# Patient Record
Sex: FEMALE | Race: WHITE | NOT HISPANIC OR LATINO | Employment: OTHER | URBAN - METROPOLITAN AREA
[De-identification: names, ages, dates, MRNs, and addresses within clinical notes are randomized per-mention and may not be internally consistent; named-entity substitution may affect disease eponyms.]

---

## 2020-06-19 ENCOUNTER — HOSPITAL ENCOUNTER (EMERGENCY)
Facility: HOSPITAL | Age: 75
Discharge: LEFT BEFORE DISCHARGE | End: 2020-06-19
Payer: MEDICARE

## 2020-06-19 VITALS
SYSTOLIC BLOOD PRESSURE: 140 MMHG | HEART RATE: 88 BPM | BODY MASS INDEX: 25.69 KG/M2 | DIASTOLIC BLOOD PRESSURE: 90 MMHG | RESPIRATION RATE: 18 BRPM | OXYGEN SATURATION: 97 % | WEIGHT: 145 LBS | TEMPERATURE: 99 F | HEIGHT: 63 IN

## 2020-06-19 DIAGNOSIS — R07.9 CHEST PAIN: ICD-10-CM

## 2020-06-19 LAB
ALBUMIN SERPL BCP-MCNC: 4.4 G/DL (ref 3.5–5.2)
ALP SERPL-CCNC: 55 U/L (ref 55–135)
ALT SERPL W/O P-5'-P-CCNC: 16 U/L (ref 10–44)
ANION GAP SERPL CALC-SCNC: 15 MMOL/L (ref 8–16)
AST SERPL-CCNC: 18 U/L (ref 10–40)
BASOPHILS # BLD AUTO: 0.04 K/UL (ref 0–0.2)
BASOPHILS NFR BLD: 0.7 % (ref 0–1.9)
BILIRUB SERPL-MCNC: 0.8 MG/DL (ref 0.1–1)
BNP SERPL-MCNC: 118 PG/ML (ref 0–99)
BUN SERPL-MCNC: 16 MG/DL (ref 8–23)
CALCIUM SERPL-MCNC: 9.5 MG/DL (ref 8.7–10.5)
CHLORIDE SERPL-SCNC: 101 MMOL/L (ref 95–110)
CO2 SERPL-SCNC: 21 MMOL/L (ref 23–29)
CREAT SERPL-MCNC: 0.8 MG/DL (ref 0.5–1.4)
DIFFERENTIAL METHOD: NORMAL
EOSINOPHIL # BLD AUTO: 0.1 K/UL (ref 0–0.5)
EOSINOPHIL NFR BLD: 2.3 % (ref 0–8)
ERYTHROCYTE [DISTWIDTH] IN BLOOD BY AUTOMATED COUNT: 14 % (ref 11.5–14.5)
EST. GFR  (AFRICAN AMERICAN): >60 ML/MIN/1.73 M^2
EST. GFR  (NON AFRICAN AMERICAN): >60 ML/MIN/1.73 M^2
GLUCOSE SERPL-MCNC: 96 MG/DL (ref 70–110)
HCT VFR BLD AUTO: 43.6 % (ref 37–48.5)
HGB BLD-MCNC: 14.8 G/DL (ref 12–16)
IMM GRANULOCYTES # BLD AUTO: 0.01 K/UL (ref 0–0.04)
IMM GRANULOCYTES NFR BLD AUTO: 0.2 % (ref 0–0.5)
LYMPHOCYTES # BLD AUTO: 2.1 K/UL (ref 1–4.8)
LYMPHOCYTES NFR BLD: 35.4 % (ref 18–48)
MCH RBC QN AUTO: 30.5 PG (ref 27–31)
MCHC RBC AUTO-ENTMCNC: 33.9 G/DL (ref 32–36)
MCV RBC AUTO: 90 FL (ref 82–98)
MONOCYTES # BLD AUTO: 0.5 K/UL (ref 0.3–1)
MONOCYTES NFR BLD: 7.7 % (ref 4–15)
NEUTROPHILS # BLD AUTO: 3.2 K/UL (ref 1.8–7.7)
NEUTROPHILS NFR BLD: 53.7 % (ref 38–73)
NRBC BLD-RTO: 0 /100 WBC
PLATELET # BLD AUTO: 178 K/UL (ref 150–350)
PMV BLD AUTO: 11 FL (ref 9.2–12.9)
POTASSIUM SERPL-SCNC: 3.9 MMOL/L (ref 3.5–5.1)
PROT SERPL-MCNC: 7.1 G/DL (ref 6–8.4)
RBC # BLD AUTO: 4.85 M/UL (ref 4–5.4)
SODIUM SERPL-SCNC: 137 MMOL/L (ref 136–145)
TROPONIN I SERPL DL<=0.01 NG/ML-MCNC: <0.03 NG/ML
WBC # BLD AUTO: 5.96 K/UL (ref 3.9–12.7)

## 2020-06-19 PROCEDURE — 83880 ASSAY OF NATRIURETIC PEPTIDE: CPT

## 2020-06-19 PROCEDURE — 84484 ASSAY OF TROPONIN QUANT: CPT

## 2020-06-19 PROCEDURE — 85025 COMPLETE CBC W/AUTO DIFF WBC: CPT

## 2020-06-19 PROCEDURE — 99283 EMERGENCY DEPT VISIT LOW MDM: CPT

## 2020-06-19 PROCEDURE — 80053 COMPREHEN METABOLIC PANEL: CPT

## 2020-06-19 RX ORDER — ASPIRIN 325 MG
325 TABLET ORAL
Status: DISCONTINUED | OUTPATIENT
Start: 2020-06-19 | End: 2020-06-19 | Stop reason: HOSPADM

## 2020-09-21 ENCOUNTER — TELEPHONE (OUTPATIENT)
Dept: CARDIOLOGY | Facility: CLINIC | Age: 75
End: 2020-09-21

## 2020-10-12 ENCOUNTER — OFFICE VISIT (OUTPATIENT)
Dept: CARDIOLOGY | Facility: CLINIC | Age: 75
End: 2020-10-12
Payer: MEDICARE

## 2020-10-12 VITALS
OXYGEN SATURATION: 98 % | RESPIRATION RATE: 16 BRPM | HEIGHT: 63 IN | WEIGHT: 145 LBS | BODY MASS INDEX: 25.69 KG/M2 | HEART RATE: 60 BPM

## 2020-10-12 DIAGNOSIS — M19.90 ARTHRITIS: ICD-10-CM

## 2020-10-12 DIAGNOSIS — K21.9 GASTROESOPHAGEAL REFLUX DISEASE, UNSPECIFIED WHETHER ESOPHAGITIS PRESENT: ICD-10-CM

## 2020-10-12 DIAGNOSIS — I48.0 PAROXYSMAL ATRIAL FIBRILLATION: Primary | ICD-10-CM

## 2020-10-12 PROCEDURE — 1159F PR MEDICATION LIST DOCUMENTED IN MEDICAL RECORD: ICD-10-PCS | Mod: S$GLB,,, | Performed by: INTERNAL MEDICINE

## 2020-10-12 PROCEDURE — 1100F PR PT FALLS ASSESS DOC 2+ FALLS/FALL W/INJURY/YR: ICD-10-PCS | Mod: CPTII,S$GLB,, | Performed by: INTERNAL MEDICINE

## 2020-10-12 PROCEDURE — 1159F MED LIST DOCD IN RCRD: CPT | Mod: S$GLB,,, | Performed by: INTERNAL MEDICINE

## 2020-10-12 PROCEDURE — 99214 PR OFFICE/OUTPT VISIT, EST, LEVL IV, 30-39 MIN: ICD-10-PCS | Mod: S$GLB,,, | Performed by: INTERNAL MEDICINE

## 2020-10-12 PROCEDURE — 1126F PR PAIN SEVERITY QUANTIFIED, NO PAIN PRESENT: ICD-10-PCS | Mod: S$GLB,,, | Performed by: INTERNAL MEDICINE

## 2020-10-12 PROCEDURE — 3288F PR FALLS RISK ASSESSMENT DOCUMENTED: ICD-10-PCS | Mod: CPTII,S$GLB,, | Performed by: INTERNAL MEDICINE

## 2020-10-12 PROCEDURE — 93000 ELECTROCARDIOGRAM COMPLETE: CPT | Mod: S$GLB,,, | Performed by: INTERNAL MEDICINE

## 2020-10-12 PROCEDURE — 1100F PTFALLS ASSESS-DOCD GE2>/YR: CPT | Mod: CPTII,S$GLB,, | Performed by: INTERNAL MEDICINE

## 2020-10-12 PROCEDURE — 3288F FALL RISK ASSESSMENT DOCD: CPT | Mod: CPTII,S$GLB,, | Performed by: INTERNAL MEDICINE

## 2020-10-12 PROCEDURE — 99214 OFFICE O/P EST MOD 30 MIN: CPT | Mod: S$GLB,,, | Performed by: INTERNAL MEDICINE

## 2020-10-12 PROCEDURE — 1126F AMNT PAIN NOTED NONE PRSNT: CPT | Mod: S$GLB,,, | Performed by: INTERNAL MEDICINE

## 2020-10-12 PROCEDURE — 93000 EKG 12-LEAD: ICD-10-PCS | Mod: S$GLB,,, | Performed by: INTERNAL MEDICINE

## 2020-10-12 RX ORDER — PANTOPRAZOLE SODIUM 40 MG/1
40 TABLET, DELAYED RELEASE ORAL DAILY
COMMUNITY
End: 2022-05-17 | Stop reason: HOSPADM

## 2020-10-12 RX ORDER — CELECOXIB 200 MG/1
200 CAPSULE ORAL 2 TIMES DAILY
COMMUNITY
End: 2022-01-23

## 2020-10-12 RX ORDER — METOPROLOL SUCCINATE 50 MG/1
50 TABLET, EXTENDED RELEASE ORAL DAILY
COMMUNITY
End: 2022-01-23

## 2020-10-12 RX ORDER — CLONAZEPAM 1 MG/1
0.5 TABLET ORAL NIGHTLY
COMMUNITY
End: 2022-08-03

## 2020-10-12 RX ORDER — BACLOFEN 10 MG/1
10 TABLET ORAL 2 TIMES DAILY
COMMUNITY
End: 2022-02-28 | Stop reason: ALTCHOICE

## 2020-10-12 NOTE — PROGRESS NOTES
Subjective:   @FIDEL  @Patient ID:  Madisyn Velasquez is a 74 y.o. @SEX  @ who presents for follow-up of Palpitations, Atrial Fibrillation, and Establish Care      HPI: Ms Velasquez is here for evaluation.  She has been feeling good no specific problems at the present time.  That 1 episode she has had the atrial fibrillation was a transient 1 she has not had any more irregular heart rhythms or palpitations.  Patient does have sensation of irregular heart rhythm when she misses her metoprolol.  And sometimes she can not feel that it is irregular.  And usually goes away the moment she takes her medications.  Patient denies any chest pain or tightness or heaviness.  Her breathing is good denies any shortness of breath or difficulty in breathing.  Denies any dizziness lightheadedness denies any falls head injury.  Denies any palpitations at the present time.  Denies any bleeding issues of blood in the stool or blood in the urine.      Review of patient's allergies indicates:  No Known Allergies    Past Medical History:   Diagnosis Date    Anxiety     Constipation      History reviewed. No pertinent surgical history.  Social History     Tobacco Use    Smoking status: Never Smoker   Substance Use Topics    Alcohol use: Yes     Alcohol/week: 2.0 standard drinks     Types: 2 Glasses of wine per week    Drug use: Never        Review of Systems:     ROS  Review of Systems   Constitution: Negative for diaphoresis and fever.   HENT: Negative for nosebleeds.    Cardiovascular: Negative for chest pain, dyspnea on exertion, leg swelling and palpitations.   Respiratory: Negative for shortness of breath and wheezing.    Hematologic/Lymphatic: Negative for bleeding problem. Does not bruise/bleed easily.   Skin: Negative for color change and rash.   Musculoskeletal: Negative for falls and myalgias.   Gastrointestinal: Negative for hematemesis and hematochezia.   Genitourinary: Negative for hematuria.   Neurological:  Negative for dizziness and light-headedness.   Psychiatric/Behavioral: Negative for altered mental status and memory loss.          Objective:        Vitals:    10/12/20 1449   Pulse: 60   Resp: 16       Lab Results   Component Value Date    WBC 5.96 06/19/2020    HGB 14.8 06/19/2020    HCT 43.6 06/19/2020     06/19/2020    ALT 16 06/19/2020    AST 18 06/19/2020     06/19/2020    K 3.9 06/19/2020     06/19/2020    CREATININE 0.8 06/19/2020    BUN 16 06/19/2020    CO2 21 (L) 06/19/2020        ECHOCARDIOGRAM RESULTS  No results found for this or any previous visit.      CURRENT/PREVIOUS VISIT EKG  No results found for this or any previous visit.  No procedure found.   No results found for this or any previous visit.    Physical Exam:    Physical Exam   HEENT: Normocephalic, atraumatic,   PERRL, Conjunctiva pink, no scleral icterus.   NECK:  No JVD no carotid bruit  CVS: S1S2+, RRR, no murmurs, rubs or gallops, JVP: Normal.  LUNGS: Clear  ABDOMEN: Soft, NT, BS+  EXTREMITIES: No cyanosis, clubbing or edema  Gu: No miller catheter, denies dysuria, no hematuria  NEURO: AAO X 3.   PSY :  Normal affect        Medication List with Changes/Refills   Current Medications    BACLOFEN (LIORESAL) 10 MG TABLET    Take 10 mg by mouth 2 (two) times daily.    CELECOXIB (CELEBREX) 200 MG CAPSULE    Take 200 mg by mouth 2 (two) times daily.    CLONAZEPAM (KLONOPIN) 1 MG TABLET    Take 1 mg by mouth 2 (two) times daily as needed for Anxiety.    METOPROLOL SUCCINATE (TOPROL-XL) 50 MG 24 HR TABLET    Take 50 mg by mouth once daily.    PANTOPRAZOLE (PROTONIX) 40 MG TABLET    Take 40 mg by mouth once daily.               Assessment:       1. Paroxysmal atrial fibrillation     2.  Arthritis in the hips  3.  Gastroesophageal reflux disease      Plan:   1.  Patient has been doing well she has not had any further episodes of palpitations or at paroxysmal atrial fibrillation.  Discussed with patient that she needs to continue  her metoprolol succinate which she takes regularly at night that we need to break it into to have sent take half in the morning half in the evening so that she gets Young well.  2.  Discussed with patient that in the chads Vasc score age is the only positive finding.  At the present time aspirin will help was patient to go on aspirin 81 mg p.o. q.day.  3.  Patient to continue to walk and exercise on regular basis.  4.  Continue her low-fat low-cholesterol diet.  5.  I will see her back in the office in 4-6 months time.    Problem List Items Addressed This Visit     None      Visit Diagnoses     Paroxysmal atrial fibrillation    -  Primary    Relevant Orders    IN OFFICE EKG 12-LEAD (to [place])          No follow-ups on file.

## 2020-10-12 NOTE — LETTER
October 12, 2020      Lamar Spence MD  [facility] [address]           [facility] [place]  [address]  [place] [address]  Phone: [phone]  Fax: [phone]          Patient: Madisyn Velasquez   MR Number: [id number]   YOB: 1945   Date of Visit: 10/12/2020       Dear Dr. Lamar Spence:    Thank you for referring Madisyn Velasquez to me for evaluation. Attached you will find relevant portions of my assessment and plan of care.    If you have questions, please do not hesitate to call me. I look forward to following Madisyn Velasquez along with you.    Sincerely,    Nikolas Aquino MD    Enclosure  CC:  No Recipients    If you would like to receive this communication electronically, please contact [email or web address] or [phone] to request more information on [facility] access.    For providers and/or their staff who would like to refer a patient to [facility], please contact us through our one-stop-shop provider referral line, [facility], at [phone].    If you feel you have received this communication in error or would no longer like to receive these types of communications, please e-mail [email or web address]

## 2021-01-15 ENCOUNTER — IMMUNIZATION (OUTPATIENT)
Dept: FAMILY MEDICINE | Facility: CLINIC | Age: 76
End: 2021-01-15
Payer: MEDICARE

## 2021-01-15 DIAGNOSIS — Z23 NEED FOR VACCINATION: Primary | ICD-10-CM

## 2021-01-15 PROCEDURE — 91300 COVID-19, MRNA, LNP-S, PF, 30 MCG/0.3 ML DOSE VACCINE: CPT | Mod: PBBFAC | Performed by: NURSE PRACTITIONER

## 2021-02-05 ENCOUNTER — IMMUNIZATION (OUTPATIENT)
Dept: FAMILY MEDICINE | Facility: CLINIC | Age: 76
End: 2021-02-05
Payer: MEDICARE

## 2021-02-05 DIAGNOSIS — Z23 NEED FOR VACCINATION: Primary | ICD-10-CM

## 2021-02-05 PROCEDURE — 0002A COVID-19, MRNA, LNP-S, PF, 30 MCG/0.3 ML DOSE VACCINE: CPT | Mod: PBBFAC | Performed by: FAMILY MEDICINE

## 2021-02-05 PROCEDURE — 91300 COVID-19, MRNA, LNP-S, PF, 30 MCG/0.3 ML DOSE VACCINE: CPT | Mod: PBBFAC | Performed by: FAMILY MEDICINE

## 2022-01-23 ENCOUNTER — HOSPITAL ENCOUNTER (INPATIENT)
Facility: HOSPITAL | Age: 77
LOS: 2 days | Discharge: HOME HEALTH | DRG: 087 | End: 2022-01-25
Attending: EMERGENCY MEDICINE | Admitting: INTERNAL MEDICINE
Payer: MEDICARE

## 2022-01-23 DIAGNOSIS — S50.01XA CONTUSION OF RIGHT ELBOW, INITIAL ENCOUNTER: ICD-10-CM

## 2022-01-23 DIAGNOSIS — S00.83XA CONTUSION OF FACE, INITIAL ENCOUNTER: ICD-10-CM

## 2022-01-23 DIAGNOSIS — S06.5XAA SUBDURAL HEMATOMA: ICD-10-CM

## 2022-01-23 DIAGNOSIS — S06.6X0A SUBARACHNOID HEMORRHAGE FOLLOWING INJURY, NO LOSS OF CONSCIOUSNESS, INITIAL ENCOUNTER: ICD-10-CM

## 2022-01-23 DIAGNOSIS — R07.9 CHEST PAIN: ICD-10-CM

## 2022-01-23 DIAGNOSIS — S63.501A SPRAIN OF RIGHT WRIST, INITIAL ENCOUNTER: ICD-10-CM

## 2022-01-23 DIAGNOSIS — S09.8XXA BLUNT HEAD TRAUMA, INITIAL ENCOUNTER: Primary | ICD-10-CM

## 2022-01-23 DIAGNOSIS — S09.90XA CLOSED HEAD INJURY, INITIAL ENCOUNTER: ICD-10-CM

## 2022-01-23 PROBLEM — K21.9 GASTROESOPHAGEAL REFLUX DISEASE WITHOUT ESOPHAGITIS: Status: ACTIVE | Noted: 2022-01-23

## 2022-01-23 PROBLEM — I60.9 SUBARACHNOID BLEED: Status: ACTIVE | Noted: 2022-01-23

## 2022-01-23 PROBLEM — F32.A DEPRESSION: Status: ACTIVE | Noted: 2022-01-23

## 2022-01-23 PROBLEM — I48.91 ATRIAL FIBRILLATION: Status: ACTIVE | Noted: 2022-01-23

## 2022-01-23 PROBLEM — E78.5 HYPERLIPIDEMIA: Status: ACTIVE | Noted: 2022-01-23

## 2022-01-23 LAB
ABO + RH BLD: NORMAL
ALBUMIN SERPL BCP-MCNC: 4.3 G/DL (ref 3.5–5.2)
ALP SERPL-CCNC: 41 U/L (ref 55–135)
ALT SERPL W/O P-5'-P-CCNC: 15 U/L (ref 10–44)
ANION GAP SERPL CALC-SCNC: 10 MMOL/L (ref 8–16)
APTT PPP: 27.4 SEC (ref 23.3–35.1)
AST SERPL-CCNC: 16 U/L (ref 10–40)
BASOPHILS # BLD AUTO: 0.04 K/UL (ref 0–0.2)
BASOPHILS NFR BLD: 0.6 % (ref 0–1.9)
BILIRUB SERPL-MCNC: 0.6 MG/DL (ref 0.1–1)
BLD GP AB SCN CELLS X3 SERPL QL: NORMAL
BLD PROD TYP BPU: NORMAL
BLD PROD TYP BPU: NORMAL
BLOOD UNIT EXPIRATION DATE: NORMAL
BLOOD UNIT EXPIRATION DATE: NORMAL
BLOOD UNIT TYPE CODE: 8400
BLOOD UNIT TYPE CODE: 8400
BLOOD UNIT TYPE: NORMAL
BLOOD UNIT TYPE: NORMAL
BUN SERPL-MCNC: 12 MG/DL (ref 8–23)
CALCIUM SERPL-MCNC: 9.2 MG/DL (ref 8.7–10.5)
CHLORIDE SERPL-SCNC: 99 MMOL/L (ref 95–110)
CK MB SERPL-MCNC: 2.7 NG/ML (ref 0.1–6.5)
CK SERPL-CCNC: 61 U/L (ref 20–180)
CO2 SERPL-SCNC: 24 MMOL/L (ref 23–29)
CODING SYSTEM: NORMAL
CODING SYSTEM: NORMAL
CREAT SERPL-MCNC: 0.5 MG/DL (ref 0.5–1.4)
DIFFERENTIAL METHOD: NORMAL
DISPENSE STATUS: NORMAL
DISPENSE STATUS: NORMAL
EOSINOPHIL # BLD AUTO: 0.1 K/UL (ref 0–0.5)
EOSINOPHIL NFR BLD: 1.4 % (ref 0–8)
ERYTHROCYTE [DISTWIDTH] IN BLOOD BY AUTOMATED COUNT: 13.4 % (ref 11.5–14.5)
EST. GFR  (AFRICAN AMERICAN): >60 ML/MIN/1.73 M^2
EST. GFR  (NON AFRICAN AMERICAN): >60 ML/MIN/1.73 M^2
GLUCOSE SERPL-MCNC: 112 MG/DL (ref 70–110)
HCT VFR BLD AUTO: 44.2 % (ref 37–48.5)
HGB BLD-MCNC: 14.3 G/DL (ref 12–16)
IMM GRANULOCYTES # BLD AUTO: 0.02 K/UL (ref 0–0.04)
IMM GRANULOCYTES NFR BLD AUTO: 0.3 % (ref 0–0.5)
INR PPP: 1.1
LYMPHOCYTES # BLD AUTO: 1.5 K/UL (ref 1–4.8)
LYMPHOCYTES NFR BLD: 22.8 % (ref 18–48)
MCH RBC QN AUTO: 30.3 PG (ref 27–31)
MCHC RBC AUTO-ENTMCNC: 32.4 G/DL (ref 32–36)
MCV RBC AUTO: 94 FL (ref 82–98)
MONOCYTES # BLD AUTO: 0.5 K/UL (ref 0.3–1)
MONOCYTES NFR BLD: 8.3 % (ref 4–15)
NEUTROPHILS # BLD AUTO: 4.3 K/UL (ref 1.8–7.7)
NEUTROPHILS NFR BLD: 66.6 % (ref 38–73)
NRBC BLD-RTO: 0 /100 WBC
NUM UNITS TRANS FFP: NORMAL
NUM UNITS TRANS FFP: NORMAL
PLATELET # BLD AUTO: 179 K/UL (ref 150–450)
PMV BLD AUTO: 10.4 FL (ref 9.2–12.9)
POTASSIUM SERPL-SCNC: 3.9 MMOL/L (ref 3.5–5.1)
PROT SERPL-MCNC: 6.8 G/DL (ref 6–8.4)
PROTHROMBIN TIME: 13.5 SEC (ref 11.4–13.7)
RBC # BLD AUTO: 4.72 M/UL (ref 4–5.4)
SARS-COV-2 RDRP RESP QL NAA+PROBE: NEGATIVE
SODIUM SERPL-SCNC: 133 MMOL/L (ref 136–145)
TROPONIN I SERPL DL<=0.01 NG/ML-MCNC: <0.03 NG/ML
TSH SERPL DL<=0.005 MIU/L-ACNC: 4.37 UIU/ML (ref 0.34–5.6)
WBC # BLD AUTO: 6.49 K/UL (ref 3.9–12.7)

## 2022-01-23 PROCEDURE — 82550 ASSAY OF CK (CPK): CPT | Performed by: EMERGENCY MEDICINE

## 2022-01-23 PROCEDURE — 99291 CRITICAL CARE FIRST HOUR: CPT

## 2022-01-23 PROCEDURE — 93010 ELECTROCARDIOGRAM REPORT: CPT | Mod: ,,, | Performed by: SPECIALIST

## 2022-01-23 PROCEDURE — 25000003 PHARM REV CODE 250: Performed by: INTERNAL MEDICINE

## 2022-01-23 PROCEDURE — 86850 RBC ANTIBODY SCREEN: CPT | Performed by: EMERGENCY MEDICINE

## 2022-01-23 PROCEDURE — 20000000 HC ICU ROOM

## 2022-01-23 PROCEDURE — 25000003 PHARM REV CODE 250: Performed by: EMERGENCY MEDICINE

## 2022-01-23 PROCEDURE — P9017 PLASMA 1 DONOR FRZ W/IN 8 HR: HCPCS | Performed by: EMERGENCY MEDICINE

## 2022-01-23 PROCEDURE — U0002 COVID-19 LAB TEST NON-CDC: HCPCS | Performed by: EMERGENCY MEDICINE

## 2022-01-23 PROCEDURE — 85610 PROTHROMBIN TIME: CPT | Performed by: EMERGENCY MEDICINE

## 2022-01-23 PROCEDURE — 85025 COMPLETE CBC W/AUTO DIFF WBC: CPT | Performed by: EMERGENCY MEDICINE

## 2022-01-23 PROCEDURE — 86900 BLOOD TYPING SEROLOGIC ABO: CPT | Performed by: EMERGENCY MEDICINE

## 2022-01-23 PROCEDURE — 36415 COLL VENOUS BLD VENIPUNCTURE: CPT | Performed by: EMERGENCY MEDICINE

## 2022-01-23 PROCEDURE — 80053 COMPREHEN METABOLIC PANEL: CPT | Performed by: EMERGENCY MEDICINE

## 2022-01-23 PROCEDURE — 82553 CREATINE MB FRACTION: CPT | Performed by: EMERGENCY MEDICINE

## 2022-01-23 PROCEDURE — 84484 ASSAY OF TROPONIN QUANT: CPT | Performed by: EMERGENCY MEDICINE

## 2022-01-23 PROCEDURE — 99223 1ST HOSP IP/OBS HIGH 75: CPT | Mod: ,,, | Performed by: NEUROLOGICAL SURGERY

## 2022-01-23 PROCEDURE — 36430 TRANSFUSION BLD/BLD COMPNT: CPT

## 2022-01-23 PROCEDURE — 93005 ELECTROCARDIOGRAM TRACING: CPT | Performed by: SPECIALIST

## 2022-01-23 PROCEDURE — 25000003 PHARM REV CODE 250: Performed by: NURSE PRACTITIONER

## 2022-01-23 PROCEDURE — 93010 EKG 12-LEAD: ICD-10-PCS | Mod: ,,, | Performed by: SPECIALIST

## 2022-01-23 PROCEDURE — 84443 ASSAY THYROID STIM HORMONE: CPT | Performed by: EMERGENCY MEDICINE

## 2022-01-23 PROCEDURE — 99223 PR INITIAL HOSPITAL CARE,LEVL III: ICD-10-PCS | Mod: ,,, | Performed by: NEUROLOGICAL SURGERY

## 2022-01-23 PROCEDURE — 85730 THROMBOPLASTIN TIME PARTIAL: CPT | Performed by: EMERGENCY MEDICINE

## 2022-01-23 PROCEDURE — 96374 THER/PROPH/DIAG INJ IV PUSH: CPT

## 2022-01-23 PROCEDURE — 25000003 PHARM REV CODE 250: Performed by: STUDENT IN AN ORGANIZED HEALTH CARE EDUCATION/TRAINING PROGRAM

## 2022-01-23 RX ORDER — POTASSIUM CHLORIDE 7.45 MG/ML
60 INJECTION INTRAVENOUS
Status: DISCONTINUED | OUTPATIENT
Start: 2022-01-23 | End: 2022-01-25 | Stop reason: HOSPADM

## 2022-01-23 RX ORDER — ACETAMINOPHEN 325 MG/1
650 TABLET ORAL EVERY 6 HOURS PRN
Status: DISCONTINUED | OUTPATIENT
Start: 2022-01-23 | End: 2022-01-24

## 2022-01-23 RX ORDER — LABETALOL HYDROCHLORIDE 5 MG/ML
INJECTION, SOLUTION INTRAVENOUS
Status: DISPENSED
Start: 2022-01-23 | End: 2022-01-24

## 2022-01-23 RX ORDER — SOTALOL HYDROCHLORIDE 80 MG/1
40 TABLET ORAL 2 TIMES DAILY
COMMUNITY

## 2022-01-23 RX ORDER — BUPROPION HYDROCHLORIDE 100 MG/1
100 TABLET ORAL 2 TIMES DAILY
Status: DISCONTINUED | OUTPATIENT
Start: 2022-01-23 | End: 2022-01-25 | Stop reason: HOSPADM

## 2022-01-23 RX ORDER — LIDOCAINE HYDROCHLORIDE 10 MG/ML
5 INJECTION, SOLUTION EPIDURAL; INFILTRATION; INTRACAUDAL; PERINEURAL
Status: COMPLETED | OUTPATIENT
Start: 2022-01-23 | End: 2022-01-23

## 2022-01-23 RX ORDER — POTASSIUM CHLORIDE 7.45 MG/ML
80 INJECTION INTRAVENOUS
Status: DISCONTINUED | OUTPATIENT
Start: 2022-01-23 | End: 2022-01-25 | Stop reason: HOSPADM

## 2022-01-23 RX ORDER — HYDROCODONE BITARTRATE AND ACETAMINOPHEN 500; 5 MG/1; MG/1
TABLET ORAL
Status: DISCONTINUED | OUTPATIENT
Start: 2022-01-23 | End: 2022-01-24

## 2022-01-23 RX ORDER — MAGNESIUM SULFATE HEPTAHYDRATE 40 MG/ML
2 INJECTION, SOLUTION INTRAVENOUS
Status: DISCONTINUED | OUTPATIENT
Start: 2022-01-23 | End: 2022-01-25 | Stop reason: HOSPADM

## 2022-01-23 RX ORDER — ROSUVASTATIN CALCIUM 10 MG/1
10 TABLET, COATED ORAL DAILY
COMMUNITY
End: 2022-06-07 | Stop reason: SDUPTHER

## 2022-01-23 RX ORDER — NICARDIPINE HYDROCHLORIDE 0.2 MG/ML
0-15 INJECTION INTRAVENOUS CONTINUOUS
Status: DISCONTINUED | OUTPATIENT
Start: 2022-01-23 | End: 2022-01-25 | Stop reason: HOSPADM

## 2022-01-23 RX ORDER — LEVETIRACETAM 500 MG/1
500 TABLET ORAL 2 TIMES DAILY
Status: DISCONTINUED | OUTPATIENT
Start: 2022-01-24 | End: 2022-01-25 | Stop reason: HOSPADM

## 2022-01-23 RX ORDER — PANTOPRAZOLE SODIUM 40 MG/1
40 TABLET, DELAYED RELEASE ORAL
Status: DISCONTINUED | OUTPATIENT
Start: 2022-01-24 | End: 2022-01-23

## 2022-01-23 RX ORDER — BUPROPION HYDROCHLORIDE 100 MG/1
100 TABLET ORAL 2 TIMES DAILY
COMMUNITY
End: 2022-08-03 | Stop reason: SDUPTHER

## 2022-01-23 RX ORDER — LEVETIRACETAM 10 MG/ML
1000 INJECTION INTRAVASCULAR ONCE
Status: COMPLETED | OUTPATIENT
Start: 2022-01-23 | End: 2022-01-24

## 2022-01-23 RX ORDER — ESCITALOPRAM OXALATE 10 MG/1
20 TABLET ORAL DAILY
Status: DISCONTINUED | OUTPATIENT
Start: 2022-01-24 | End: 2022-01-25 | Stop reason: HOSPADM

## 2022-01-23 RX ORDER — LABETALOL HYDROCHLORIDE 5 MG/ML
10 INJECTION, SOLUTION INTRAVENOUS
Status: COMPLETED | OUTPATIENT
Start: 2022-01-23 | End: 2022-01-23

## 2022-01-23 RX ORDER — MAGNESIUM SULFATE HEPTAHYDRATE 40 MG/ML
4 INJECTION, SOLUTION INTRAVENOUS
Status: DISCONTINUED | OUTPATIENT
Start: 2022-01-23 | End: 2022-01-25 | Stop reason: HOSPADM

## 2022-01-23 RX ORDER — ONDANSETRON 4 MG/1
8 TABLET, ORALLY DISINTEGRATING ORAL EVERY 8 HOURS PRN
Status: DISCONTINUED | OUTPATIENT
Start: 2022-01-23 | End: 2022-01-24

## 2022-01-23 RX ORDER — PANTOPRAZOLE SODIUM 40 MG/10ML
40 INJECTION, POWDER, LYOPHILIZED, FOR SOLUTION INTRAVENOUS DAILY
Status: DISCONTINUED | OUTPATIENT
Start: 2022-01-24 | End: 2022-01-25 | Stop reason: HOSPADM

## 2022-01-23 RX ORDER — CLONAZEPAM 0.5 MG/1
1 TABLET ORAL 2 TIMES DAILY PRN
Status: DISCONTINUED | OUTPATIENT
Start: 2022-01-23 | End: 2022-01-25 | Stop reason: HOSPADM

## 2022-01-23 RX ORDER — SOTALOL HYDROCHLORIDE 80 MG/1
40 TABLET ORAL 2 TIMES DAILY
Status: DISCONTINUED | OUTPATIENT
Start: 2022-01-23 | End: 2022-01-25 | Stop reason: HOSPADM

## 2022-01-23 RX ORDER — LABETALOL HYDROCHLORIDE 5 MG/ML
10 INJECTION, SOLUTION INTRAVENOUS
Status: DISCONTINUED | OUTPATIENT
Start: 2022-01-23 | End: 2022-01-25 | Stop reason: HOSPADM

## 2022-01-23 RX ORDER — LABETALOL HYDROCHLORIDE 5 MG/ML
10 INJECTION, SOLUTION INTRAVENOUS
Status: CANCELLED | OUTPATIENT
Start: 2022-01-23 | End: 2022-01-23

## 2022-01-23 RX ORDER — POTASSIUM CHLORIDE 7.45 MG/ML
40 INJECTION INTRAVENOUS
Status: DISCONTINUED | OUTPATIENT
Start: 2022-01-23 | End: 2022-01-25 | Stop reason: HOSPADM

## 2022-01-23 RX ORDER — SODIUM CHLORIDE 0.9 % (FLUSH) 0.9 %
10 SYRINGE (ML) INJECTION
Status: DISCONTINUED | OUTPATIENT
Start: 2022-01-23 | End: 2022-01-25 | Stop reason: HOSPADM

## 2022-01-23 RX ORDER — ESCITALOPRAM OXALATE 20 MG/1
20 TABLET ORAL DAILY
COMMUNITY
End: 2022-06-22 | Stop reason: SDUPTHER

## 2022-01-23 RX ORDER — POLYETHYLENE GLYCOL 3350 17 G/17G
17 POWDER, FOR SOLUTION ORAL DAILY
Status: DISCONTINUED | OUTPATIENT
Start: 2022-01-24 | End: 2022-01-25 | Stop reason: HOSPADM

## 2022-01-23 RX ORDER — ATORVASTATIN CALCIUM 40 MG/1
40 TABLET, FILM COATED ORAL NIGHTLY
Status: DISCONTINUED | OUTPATIENT
Start: 2022-01-23 | End: 2022-01-25 | Stop reason: HOSPADM

## 2022-01-23 RX ADMIN — ACETAMINOPHEN 650 MG: 325 TABLET ORAL at 10:01

## 2022-01-23 RX ADMIN — LIDOCAINE HYDROCHLORIDE 50 MG: 10 INJECTION, SOLUTION EPIDURAL; INFILTRATION; INTRACAUDAL; PERINEURAL at 08:01

## 2022-01-23 RX ADMIN — NICARDIPINE HYDROCHLORIDE 5 MG/HR: 0.2 INJECTION INTRAVENOUS at 08:01

## 2022-01-23 RX ADMIN — ATORVASTATIN CALCIUM 40 MG: 40 TABLET, FILM COATED ORAL at 09:01

## 2022-01-23 RX ADMIN — LABETALOL HYDROCHLORIDE 10 MG: 5 INJECTION, SOLUTION INTRAVENOUS at 08:01

## 2022-01-24 PROBLEM — Z79.01 ANTICOAGULATED BY ANTICOAGULATION TREATMENT: Chronic | Status: ACTIVE | Noted: 2022-01-24

## 2022-01-24 PROBLEM — S06.5XAA SDH (SUBDURAL HEMATOMA): Status: ACTIVE | Noted: 2022-01-24
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
## 2022-01-24 PROBLEM — I48.91 ATRIAL FIBRILLATION: Chronic | Status: ACTIVE | Noted: 2022-01-23

## 2022-01-24 PROBLEM — E87.1 HYPONATREMIA: Status: ACTIVE | Noted: 2022-01-24
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
## 2022-01-24 LAB
ANION GAP SERPL CALC-SCNC: 9 MMOL/L (ref 8–16)
BASOPHILS # BLD AUTO: 0.02 K/UL (ref 0–0.2)
BASOPHILS NFR BLD: 0.3 % (ref 0–1.9)
BUN SERPL-MCNC: 10 MG/DL (ref 8–23)
CALCIUM SERPL-MCNC: 8.8 MG/DL (ref 8.7–10.5)
CHLORIDE SERPL-SCNC: 102 MMOL/L (ref 95–110)
CHOLEST SERPL-MCNC: 145 MG/DL (ref 120–199)
CHOLEST/HDLC SERPL: 2.1 {RATIO} (ref 2–5)
CO2 SERPL-SCNC: 22 MMOL/L (ref 23–29)
CREAT SERPL-MCNC: 0.5 MG/DL (ref 0.5–1.4)
DIFFERENTIAL METHOD: ABNORMAL
EOSINOPHIL # BLD AUTO: 0.1 K/UL (ref 0–0.5)
EOSINOPHIL NFR BLD: 0.8 % (ref 0–8)
ERYTHROCYTE [DISTWIDTH] IN BLOOD BY AUTOMATED COUNT: 13.5 % (ref 11.5–14.5)
ERYTHROCYTE [DISTWIDTH] IN BLOOD BY AUTOMATED COUNT: 13.5 % (ref 11.5–14.5)
EST. GFR  (AFRICAN AMERICAN): >60 ML/MIN/1.73 M^2
EST. GFR  (NON AFRICAN AMERICAN): >60 ML/MIN/1.73 M^2
ESTIMATED AVG GLUCOSE: 103 MG/DL (ref 68–131)
GLUCOSE SERPL-MCNC: 111 MG/DL (ref 70–110)
HBA1C MFR BLD: 5.2 % (ref 4.5–6.2)
HCT VFR BLD AUTO: 38.5 % (ref 37–48.5)
HCT VFR BLD AUTO: 38.5 % (ref 37–48.5)
HDLC SERPL-MCNC: 68 MG/DL (ref 40–75)
HDLC SERPL: 46.9 % (ref 20–50)
HGB BLD-MCNC: 12.9 G/DL (ref 12–16)
HGB BLD-MCNC: 12.9 G/DL (ref 12–16)
IMM GRANULOCYTES # BLD AUTO: 0.04 K/UL (ref 0–0.04)
IMM GRANULOCYTES NFR BLD AUTO: 0.6 % (ref 0–0.5)
LDLC SERPL CALC-MCNC: 66.4 MG/DL (ref 63–159)
LYMPHOCYTES # BLD AUTO: 1.1 K/UL (ref 1–4.8)
LYMPHOCYTES NFR BLD: 16.8 % (ref 18–48)
MAGNESIUM SERPL-MCNC: 1.8 MG/DL (ref 1.6–2.6)
MCH RBC QN AUTO: 30.6 PG (ref 27–31)
MCH RBC QN AUTO: 30.6 PG (ref 27–31)
MCHC RBC AUTO-ENTMCNC: 33.5 G/DL (ref 32–36)
MCHC RBC AUTO-ENTMCNC: 33.5 G/DL (ref 32–36)
MCV RBC AUTO: 91 FL (ref 82–98)
MCV RBC AUTO: 91 FL (ref 82–98)
MONOCYTES # BLD AUTO: 0.4 K/UL (ref 0.3–1)
MONOCYTES NFR BLD: 5.6 % (ref 4–15)
NEUTROPHILS # BLD AUTO: 4.9 K/UL (ref 1.8–7.7)
NEUTROPHILS NFR BLD: 75.9 % (ref 38–73)
NONHDLC SERPL-MCNC: 77 MG/DL
NRBC BLD-RTO: 0 /100 WBC
PLATELET # BLD AUTO: 141 K/UL (ref 150–450)
PLATELET # BLD AUTO: 141 K/UL (ref 150–450)
PMV BLD AUTO: 9.9 FL (ref 9.2–12.9)
PMV BLD AUTO: 9.9 FL (ref 9.2–12.9)
POTASSIUM SERPL-SCNC: 3.5 MMOL/L (ref 3.5–5.1)
RBC # BLD AUTO: 4.22 M/UL (ref 4–5.4)
RBC # BLD AUTO: 4.22 M/UL (ref 4–5.4)
SODIUM SERPL-SCNC: 133 MMOL/L (ref 136–145)
TRIGL SERPL-MCNC: 53 MG/DL (ref 30–150)
WBC # BLD AUTO: 6.44 K/UL (ref 3.9–12.7)
WBC # BLD AUTO: 6.44 K/UL (ref 3.9–12.7)

## 2022-01-24 PROCEDURE — 20000000 HC ICU ROOM

## 2022-01-24 PROCEDURE — 99232 PR SUBSEQUENT HOSPITAL CARE,LEVL II: ICD-10-PCS | Mod: ,,, | Performed by: NEUROLOGICAL SURGERY

## 2022-01-24 PROCEDURE — 80053 COMPREHEN METABOLIC PANEL: CPT | Performed by: NURSE PRACTITIONER

## 2022-01-24 PROCEDURE — 25000003 PHARM REV CODE 250: Performed by: INTERNAL MEDICINE

## 2022-01-24 PROCEDURE — 25000003 PHARM REV CODE 250: Performed by: NEUROLOGICAL SURGERY

## 2022-01-24 PROCEDURE — 85025 COMPLETE CBC W/AUTO DIFF WBC: CPT | Performed by: INTERNAL MEDICINE

## 2022-01-24 PROCEDURE — 97116 GAIT TRAINING THERAPY: CPT

## 2022-01-24 PROCEDURE — 83036 HEMOGLOBIN GLYCOSYLATED A1C: CPT | Mod: 91 | Performed by: INTERNAL MEDICINE

## 2022-01-24 PROCEDURE — 36415 COLL VENOUS BLD VENIPUNCTURE: CPT | Performed by: INTERNAL MEDICINE

## 2022-01-24 PROCEDURE — 25000003 PHARM REV CODE 250: Performed by: NURSE PRACTITIONER

## 2022-01-24 PROCEDURE — 83735 ASSAY OF MAGNESIUM: CPT | Mod: 91 | Performed by: INTERNAL MEDICINE

## 2022-01-24 PROCEDURE — 85730 THROMBOPLASTIN TIME PARTIAL: CPT | Performed by: NURSE PRACTITIONER

## 2022-01-24 PROCEDURE — 63600175 PHARM REV CODE 636 W HCPCS: Performed by: NEUROLOGICAL SURGERY

## 2022-01-24 PROCEDURE — 84100 ASSAY OF PHOSPHORUS: CPT | Performed by: NURSE PRACTITIONER

## 2022-01-24 PROCEDURE — 25000003 PHARM REV CODE 250

## 2022-01-24 PROCEDURE — 63600175 PHARM REV CODE 636 W HCPCS: Performed by: NURSE PRACTITIONER

## 2022-01-24 PROCEDURE — 92610 EVALUATE SWALLOWING FUNCTION: CPT

## 2022-01-24 PROCEDURE — C9113 INJ PANTOPRAZOLE SODIUM, VIA: HCPCS | Performed by: NURSE PRACTITIONER

## 2022-01-24 PROCEDURE — 80048 BASIC METABOLIC PNL TOTAL CA: CPT | Performed by: INTERNAL MEDICINE

## 2022-01-24 PROCEDURE — 99900035 HC TECH TIME PER 15 MIN (STAT)

## 2022-01-24 PROCEDURE — 97166 OT EVAL MOD COMPLEX 45 MIN: CPT

## 2022-01-24 PROCEDURE — 94761 N-INVAS EAR/PLS OXIMETRY MLT: CPT

## 2022-01-24 PROCEDURE — 85610 PROTHROMBIN TIME: CPT | Performed by: NURSE PRACTITIONER

## 2022-01-24 PROCEDURE — 80061 LIPID PANEL: CPT | Performed by: NURSE PRACTITIONER

## 2022-01-24 PROCEDURE — 97535 SELF CARE MNGMENT TRAINING: CPT

## 2022-01-24 PROCEDURE — 97161 PT EVAL LOW COMPLEX 20 MIN: CPT

## 2022-01-24 PROCEDURE — 83735 ASSAY OF MAGNESIUM: CPT | Performed by: NURSE PRACTITIONER

## 2022-01-24 PROCEDURE — 80061 LIPID PANEL: CPT | Mod: 91 | Performed by: INTERNAL MEDICINE

## 2022-01-24 PROCEDURE — 99232 SBSQ HOSP IP/OBS MODERATE 35: CPT | Mod: ,,, | Performed by: NEUROLOGICAL SURGERY

## 2022-01-24 RX ORDER — ONDANSETRON 2 MG/ML
4 INJECTION INTRAMUSCULAR; INTRAVENOUS EVERY 6 HOURS PRN
Status: DISCONTINUED | OUTPATIENT
Start: 2022-01-24 | End: 2022-01-25 | Stop reason: HOSPADM

## 2022-01-24 RX ORDER — CHLORHEXIDINE GLUCONATE ORAL RINSE 1.2 MG/ML
15 SOLUTION DENTAL 2 TIMES DAILY
Status: DISCONTINUED | OUTPATIENT
Start: 2022-01-24 | End: 2022-01-25 | Stop reason: HOSPADM

## 2022-01-24 RX ORDER — ACETAMINOPHEN 325 MG/1
650 TABLET ORAL EVERY 6 HOURS PRN
Status: DISCONTINUED | OUTPATIENT
Start: 2022-01-24 | End: 2022-01-25 | Stop reason: HOSPADM

## 2022-01-24 RX ORDER — MUPIROCIN 20 MG/G
OINTMENT TOPICAL 2 TIMES DAILY
Status: DISCONTINUED | OUTPATIENT
Start: 2022-01-24 | End: 2022-01-25 | Stop reason: HOSPADM

## 2022-01-24 RX ORDER — HYDROCODONE BITARTRATE AND ACETAMINOPHEN 5; 325 MG/1; MG/1
1 TABLET ORAL EVERY 6 HOURS PRN
Status: DISCONTINUED | OUTPATIENT
Start: 2022-01-24 | End: 2022-01-25 | Stop reason: HOSPADM

## 2022-01-24 RX ADMIN — ESCITALOPRAM OXALATE 20 MG: 10 TABLET ORAL at 11:01

## 2022-01-24 RX ADMIN — BUPROPION HYDROCHLORIDE 100 MG: 100 TABLET, FILM COATED ORAL at 08:01

## 2022-01-24 RX ADMIN — CLONAZEPAM 1 MG: 0.5 TABLET ORAL at 08:01

## 2022-01-24 RX ADMIN — SOTALOL HYDROCHLORIDE 40 MG: 80 TABLET ORAL at 08:01

## 2022-01-24 RX ADMIN — MUPIROCIN: 20 OINTMENT TOPICAL at 11:01

## 2022-01-24 RX ADMIN — LEVETIRACETAM 500 MG: 500 TABLET, FILM COATED ORAL at 11:01

## 2022-01-24 RX ADMIN — LEVETIRACETAM 500 MG: 500 TABLET, FILM COATED ORAL at 08:01

## 2022-01-24 RX ADMIN — PHYTONADIONE 10 MG: 10 INJECTION, EMULSION INTRAMUSCULAR; INTRAVENOUS; SUBCUTANEOUS at 12:01

## 2022-01-24 RX ADMIN — ATORVASTATIN CALCIUM 40 MG: 40 TABLET, FILM COATED ORAL at 08:01

## 2022-01-24 RX ADMIN — LEVETIRACETAM 1000 MG: 10 INJECTION, SOLUTION INTRAVENOUS at 01:01

## 2022-01-24 RX ADMIN — BUPROPION HYDROCHLORIDE 100 MG: 100 TABLET, FILM COATED ORAL at 11:01

## 2022-01-24 RX ADMIN — MUPIROCIN: 20 OINTMENT TOPICAL at 08:01

## 2022-01-24 RX ADMIN — PANTOPRAZOLE SODIUM 40 MG: 40 INJECTION, POWDER, FOR SOLUTION INTRAVENOUS at 11:01

## 2022-01-24 RX ADMIN — ACETAMINOPHEN 650 MG: 325 TABLET, FILM COATED ORAL at 08:01

## 2022-01-24 RX ADMIN — CHLORHEXIDINE GLUCONATE 15 ML: 1.2 RINSE ORAL at 08:01

## 2022-01-24 RX ADMIN — SOTALOL HYDROCHLORIDE 40 MG: 80 TABLET ORAL at 11:01

## 2022-01-24 RX ADMIN — CHLORHEXIDINE GLUCONATE 15 ML: 1.2 RINSE ORAL at 11:01

## 2022-01-24 NOTE — PT/OT/SLP EVAL
Occupational Therapy   Evaluation    Name: Madisyn Velasquez  MRN: [id number]  Admitting Diagnosis:  SDH (subdural hematoma)  Recent Surgery: * No surgery found *      Recommendations:     Discharge Recommendations: home health OT  Discharge Equipment Recommendations:  hip kit  Barriers to discharge:  None    Assessment:     Madisyn Velasquez is a 76 y.o. female with a medical diagnosis of SDH (subdural hematoma).  Pt agreeable to OT evaluation this AM. Performance deficits affecting function: weakness,impaired endurance,impaired self care skills,impaired functional mobilty,gait instability,impaired balance,decreased upper extremity function,decreased lower extremity function,decreased safety awareness,pain,decreased ROM,impaired skin,impaired cardiopulmonary response to activity.  Pt oriented x 4, no family at bedside. Pt reports PTA she was mod I - independent with ADLs, IADLs, and functional mobility, and she is her 's primary caregiver. Pt limited during session due to R wrist pain. Rec HHOT at d/c.    Rehab Prognosis: Good; patient would benefit from acute skilled OT services to address these deficits and reach maximum level of function.       Plan:     Patient to be seen 5 x/week to address the above listed problems via self-care/home management,therapeutic activities,therapeutic exercises  · Plan of Care Expires: 02/24/22  · Plan of Care Reviewed with: patient    Subjective     Chief Complaint: ready to go home  Patient/Family Comments/goals: to return home    Occupational Profile:  Living Environment: pt lives with  in an elevated, one story home (~5 feet off the ground with a chair lift); pt has a walk-in shower with a shower chair and raised toilets   Previous level of function: Mod I - Independent with ADLs, IADLs, and functional mobility; Pt cooks, cleans, and drives; pt is 's primary caregiver   Roles and Routines: wife; primary homemaker; primary caretaker for    Equipment Used at  Home:  shower chair,raised toilet,cane, straight (chair lift for outside stairs)  Assistance upon Discharge: yes, from family    Pain/Comfort:  · Pain Rating 1:  (not rated)  · Location - Side 1: Right  · Location 1: wrist  · Pain Addressed 1: Reposition,Distraction,Cessation of Activity    Patients cultural, spiritual, Mormon conflicts given the current situation:      Objective:     Communicated with: nursing prior to session.  Patient found HOB elevated with peripheral IV,telemetry,pulse ox (continuous),blood pressure cuff upon OT entry to room.    General Precautions: Standard, fall   Orthopedic Precautions:N/A   Braces: N/A  Respiratory Status: Room air    Occupational Performance:    Functional Mobility/Transfers:  · Patient completed Sit <> Stand Transfer with minimum assistance and moderate assistance  with  hand-held assist (pt with painful R wrist and unable to use R hand to use to assist in standing)    Activities of Daily Living:  · Feeding:  independence to grab cup of water from tray table and drink from cup  · Grooming: setup assistance seated in chair     · Lower Body Dressing: minimum assistance to don/doff socks with AD; pt reports RLE stiff and having difficulty with shoes/socks PTA; OT introduced and educated patient on hip kit (sock aid, dressing stick, long handled shoe horn, and reacher)    Cognitive/Visual Perceptual:  Cognitive/Psychosocial Skills:     -       Oriented to: Person, Place, Time and Situation   -       Follows Commands/attention:Follows two-step commands  -       Communication: clear/fluent  -       Memory: No Deficits noted  -       Safety awareness/insight to disability: intact   -       Mood/Affect/Coping skills/emotional control: Appropriate to situation, Cooperative and Pleasant    Physical Exam:  Balance:    -       SBA seated balance; CGA/Min A standing balance   Upper Extremity Range of Motion:     -       Right Upper Extremity: WFL except decreased ROM to wrist due  to pain  -       Left Upper Extremity: WFL  Upper Extremity Strength:    -       Right Upper Extremity: NT due to pain  -       Left Upper Extremity: WFL   Strength:    -       Right Upper Extremity: poor-fair   -       Left Upper Extremity: fair   Fine Motor Coordination:    -       Intact  Gross motor coordination:   WFL    AMPAC 6 Click ADL:  AMPAC Total Score: 18    Treatment & Education:  Pt educated on role of OT/POC, importance of OOB/EOB activity, use of call bell, AD for LB dressing, and safety during ADLs, transfers, and functional mobility.   Education:    Patient left up in chair with all lines intact, call button in reach and RN notified    GOALS:   Multidisciplinary Problems     Occupational Therapy Goals        Problem: Occupational Therapy Goal    Goal Priority Disciplines Outcome Interventions   Occupational Therapy Goal     OT, PT/OT     Description: Goals to be met by: discharge    Patient will increase functional independence with ADLs by performing:    UE Dressing with Supervision.  LE Dressing with Supervision.  Grooming while standing at sink with Supervision.  Toileting from toilet with Supervision for hygiene and clothing management.   Toilet transfer to toilet with Supervision.                     History:     Past Medical History:   Diagnosis Date    Anxiety     Constipation        No past surgical history on file.    Time Tracking:     OT Date of Treatment: 01/24/22  OT Start Time: 1003  OT Stop Time: 1023  OT Total Time (min): 20 min    Billable Minutes:Evaluation 10  Self Care/Home Management 10    1/24/2022

## 2022-01-24 NOTE — H&P
[facility] - Emergency Dept  Hospital Medicine  History & Physical    Patient Name: Madisyn Velasquez  MRN: [id number]  Patient Class: IP- Inpatient  Admission Date: 1/23/2022  Attending Physician: Adriana Burger MD   Primary Care Provider: Lamar Spence MD         Patient information was obtained from patient and ER records.     Subjective:     Principal Problem:<principal problem not specified>    Chief Complaint:   Chief Complaint   Patient presents with    Fall     TRIP AND FALL OVER O2 TUBING, NO LOC    Facial Pain    Head Injury     NO LOC    Elbow Injury     RT    WRIST AND HAND     RT        HPI: Madisyn Velasquez is a 76 y.o. female with a history as  has a past medical history of Anxiety and Constipation. who presented to the ED with a Fall (TRIP AND FALL OVER O2 TUBING, NO LOC), Facial Pain, Head Injury (NO LOC), Elbow Injury (RT), and WRIST AND HAND (RT). Patient reports falling forward hitting her head and face. She c/o right eye and facial pain, headache, right wrist and right elbow pain. Further reports speaking to PCP who advise her to come to ED for further evaluation 2/2 her being on OAC. She denies fever, chills, diaphoresis, SOB, dizziness, chest pain, palpitations, NVD, LOC or any other associated symptoms.  Labs and image reviewed. CBC unremarkable. CM unimpressive. CXR without acute cardiopulmonary abnormality.  CT maxillofacial without acute facial fracture. CT Cervical spine without acute abnormalities. 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  XR right elbow without acute right elbow abnormality. XR right wrist without acute right wrist abnormality.  XR right hand without acute right hand abnormality. Per ED doctor, noted to have elevaeted BP given IV labetalol.  ED physician also reports speaking with Neurourgery with recs for repeat CT n 6 hours and FFP. Place in ICU for neuro checks and close monitoring.     Of note, prior to transferring to ICU, cardene gtt started for BP control.            Past Medical History:   Diagnosis Date    Anxiety     Constipation        No past surgical history on file.    Review of patient's allergies indicates:  No Known Allergies    No current facility-administered medications on file prior to encounter.     Current Outpatient Medications on File Prior to Encounter   Medication Sig    apixaban (ELIQUIS) 5 mg Tab Take 5 mg by mouth 2 (two) times daily.    baclofen (LIORESAL) 10 MG tablet Take 10 mg by mouth 2 (two) times daily.    buPROPion (WELLBUTRIN) 100 MG tablet Take 100 mg by mouth 2 (two) times daily.    clonazePAM (KLONOPIN) 1 MG tablet Take 1 mg by mouth 2 (two) times daily as needed for Anxiety.    EScitalopram oxalate (LEXAPRO) 20 MG tablet Take 20 mg by mouth once daily.    pantoprazole (PROTONIX) 40 MG tablet Take 40 mg by mouth once daily.    rosuvastatin (CRESTOR) 10 MG tablet Take 10 mg by mouth once daily.    sotaloL (BETAPACE) 80 MG tablet Take 40 mg by mouth 2 (two) times daily.    [DISCONTINUED] celecoxib (CELEBREX) 200 MG capsule Take 200 mg by mouth 2 (two) times daily.    [DISCONTINUED] metoprolol succinate (TOPROL-XL) 50 MG 24 hr tablet Take 50 mg by mouth once daily.     Family History    None       Tobacco Use    Smoking status: Never Smoker    Smokeless tobacco: Not on file   Substance and Sexual Activity    Alcohol use: Yes     Alcohol/week: 2.0 standard drinks     Types: 2 Glasses of wine per week    Drug use: Never    Sexual activity: Not on file     Review of Systems   Constitutional: Negative for chills, diaphoresis and fever.   HENT: Negative for congestion, postnasal drip, sinus pressure and sore throat.    Eyes: Positive for pain (right eye ) and visual disturbance.   Respiratory: Negative  for cough, chest tightness, shortness of breath and wheezing.    Cardiovascular: Negative for chest pain, palpitations and leg swelling.   Gastrointestinal: Negative for abdominal distention, abdominal pain, blood in stool, constipation, diarrhea, nausea and vomiting.   Endocrine: Negative.    Genitourinary: Negative for dysuria.   Musculoskeletal: Negative.         Mechanical fall with injury to right right hand, elbow and wrist   Skin: Positive for wound (over right eye).   Allergic/Immunologic: Negative.    Neurological: Positive for headaches. Negative for dizziness, weakness and numbness.   Hematological: Negative.    Psychiatric/Behavioral: Negative.      Objective:     Vital Signs (Most Recent):  Temp: 98.7 °F (37.1 °C) (01/23/22 1806)  Pulse: (!) 56 (01/23/22 2024)  Resp: 17 (01/23/22 2024)  BP: (!) 159/70 (01/23/22 2050)  SpO2: 97 % (01/23/22 2024) Vital Signs (24h Range):  Temp:  [98.7 °F (37.1 °C)] 98.7 °F (37.1 °C)  Pulse:  [55-67] 56  Resp:  [15-20] 17  SpO2:  [96 %-99 %] 97 %  BP: (159-212)/(70-84) 159/70     Weight: 65.8 kg (145 lb)  Body mass index is 25.69 kg/m².    Physical Exam  Constitutional:       General: She is not in acute distress.     Appearance: Normal appearance. She is well-developed and well-nourished. She is not toxic-appearing or diaphoretic.   HENT:      Head: Normocephalic and atraumatic.   Eyes:      General: Lids are normal.      Conjunctiva/sclera: Conjunctivae normal.      Pupils: Pupils are equal, round, and reactive to light.      Comments: Right eye swelling and ecchymosis    Neck:      Thyroid: No thyroid mass or thyromegaly.      Vascular: Normal carotid pulses. No JVD.      Trachea: Trachea normal. No tracheal deviation.   Cardiovascular:      Rate and Rhythm: Normal rate and regular rhythm.      Pulses: Normal pulses.      Heart sounds: Normal heart sounds, S1 normal and S2 normal.   Pulmonary:      Effort: Pulmonary effort is normal.      Breath sounds: Normal breath  sounds. No stridor.   Abdominal:      General: Bowel sounds are normal.      Palpations: Abdomen is soft.      Tenderness: There is no abdominal tenderness.   Musculoskeletal:         General: Normal range of motion.      Cervical back: Full passive range of motion without pain, normal range of motion and neck supple.      Comments: Tenderness at right arm and hand    Skin:     General: Skin is warm, dry and intact.      Nails: There is no clubbing or cyanosis.   Neurological:      Mental Status: She is alert and oriented to person, place, and time.      Cranial Nerves: No cranial nerve deficit.      Sensory: No sensory deficit.      Deep Tendon Reflexes: Strength normal.   Psychiatric:         Mood and Affect: Mood and affect normal.         Speech: Speech normal.         Behavior: Behavior normal. Behavior is cooperative.         Thought Content: Thought content normal.         Cognition and Memory: Cognition and memory normal.         Judgment: Judgment normal.           CRANIAL NERVES     CN III, IV, VI   Pupils are equal, round, and reactive to light.       Significant Labs:   All pertinent labs within the past 24 hours have been reviewed.  Bilirubin:   Recent Labs   Lab 01/23/22 1928   BILITOT 0.6     BMP:   Recent Labs   Lab 01/23/22 1928   *   *   K 3.9   CL 99   CO2 24   BUN 12   CREATININE 0.5   CALCIUM 9.2     CBC:   Recent Labs   Lab 01/23/22 1928   WBC 6.49   HGB 14.3   HCT 44.2        CMP:   Recent Labs   Lab 01/23/22 1928   *   K 3.9   CL 99   CO2 24   *   BUN 12   CREATININE 0.5   CALCIUM 9.2   PROT 6.8   ALBUMIN 4.3   BILITOT 0.6   ALKPHOS 41*   AST 16   ALT 15   ANIONGAP 10   EGFRNONAA >60.0     Coagulation:   Recent Labs   Lab 01/23/22 1928   INR 1.1   APTT 27.4     Troponin:   Recent Labs   Lab 01/23/22 1928   TROPONINI <0.030       Significant Imaging: I have reviewed all pertinent imaging results/findings within the past 24 hours.   X-Ray Elbow Complete  Right    Result Date: 1/23/2022  EXAMINATION: XR ELBOW COMPLETE 3 VIEW RIGHT CLINICAL HISTORY: Fall FINDINGS: Four views of right elbow show no fracture, dislocation, or destructive osseous lesion. Soft tissues unremarkable.  Negative for joint effusion.  Plate and screws transfixing proximal right radius and ulna partially visualized.     No acute right elbow abnormality. Electronically signed by: Javier Gilbert MD Date:    01/23/2022 Time:    18:32    X-Ray Wrist Complete Right    Result Date: 1/23/2022  EXAMINATION: XR WRIST COMPLETE 3 VIEWS RIGHT CLINICAL HISTORY: Fall FINDINGS: Four views of right breast show no fracture, dislocation, or destructive osseous lesion. 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  Plate and screws about distal radius and ulna partially visualized.  Soft tissues are unremarkable.     No acute right wrist abnormality. Electronically signed by: Javier Gilbert MD Date:    01/23/2022 Time:    18:32    X-Ray Hand 3 View Right    Result Date: 1/23/2022  EXAMINATION: XR HAND COMPLETE 3 VIEW RIGHT CLINICAL HISTORY: FALL; Pain, unspecified FINDINGS: Three views of right hand show no fracture, dislocation, or destructive osseous lesion. Severe right 1st CMC joint osteoarthrosis is present with DIP joint osteoarthrosis diffusely.  Either severe joint space narrowing or ankylosis of right 1st IP joint occurs.  Soft tissues are unremarkable.     No acute right hand abnormality. Electronically signed by: Javier Gilbert MD Date:    01/23/2022 Time:    18:31    CT Head Without Contrast    Result Date: 1/23/2022  CMS MANDATED QUALITY DATA - CT RADIATION - 436 All CT scans at this facility utilize dose modulation, iterative reconstruction, and/or weight based dosing when appropriate to reduce radiation dose to as low as reasonably achievable. Reason: FALL TECHNIQUE: Head CT without IV contrast. COMPARISON: None FINDINGS: Small amount of acute subarachnoid hemorrhage lies in the left frontal parietal  sulci and in the left sylvian fissure. Acute left subdural hematoma overlying the left frontotemporal lobe reaches maximal thickness of 6 mm. Mild chronic small vessel ischemic changes affect the periventricular white matter bilaterally. Old lacunar infarct suggested in left cerebellum. Calcification occurs in right gal. No midline shift. Ventricles and cisterns are maintained. Right frontal scalp soft tissue swelling occurs and supraorbital location. Calvarium is intact. Visualized sinuses are clear. IMPRESSION: 1. Small volume of acute subarachnoid hemorrhage in left frontal parietal lobe sulci and sylvian fissure. 2. 6 mm acute left subdural hematoma. 3. 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 RESULT NOTIFICATION: These observations were discussed by Dr. Gilbert with, and acknowledged by, Dr. VARGHESE at 1/23/2022 6:45 PM CST Electronically signed by:  Javier Gilbert MD  1/23/2022 6:48 PM CST Workstation: [id number]    CT Cervical Spine Without Contrast    Result Date: 1/23/2022  CMS MANDATED QUALITY DATA - CT RADIATION - 436 All CT scans at this facility utilize dose modulation, iterative reconstruction, and/or weight based dosing when appropriate to reduce radiation dose to as low as reasonably achievable. Reason: FALL TECHNIQUE: Cervical spine CT without IV contrast obtained with coronal and sagittal reformations. COMPARISON:None FINDINGS: Negative for fracture. No epidural hematoma or prevertebral soft tissue swelling. Carotid artery calcifications, left greater than right, are evident. Minor biapical pleural parenchymal scarring is noted. At C2-C3, severe right facet joint osteoarthrosis. At C3-C4, moderate right facet joint hypertrophic change and ankylosis is noted along with minor left facet joint osteoarthrosis. At C4-C5, moderate bilateral facet joint osteoarthrosis results in minor left neural foramen narrowing. At C5-C6, posterior osteophytic ridge and  moderate left facet joint osteoarthrosis results in severe left neural foramen narrowing. At C6-C7, posterior osteophytic ridge and moderate left and mild right facet joint osteoarthrosis results in moderate left neural foramen narrowing. At C7-T1, no narrowing. Coronal and sagittal reformations show normal alignment without abnormal facet widening. IMPRESSION: Cervical spine degenerative changes, without acute abnormality. Electronically signed by:  Javier Gilbert MD  1/23/2022 6:51 PM CST Workstation: [id number]    CT Maxillofacial Without Contrast    Result Date: 1/23/2022  CMS MANDATED QUALITY DATA - CT RADIATION - 436 All CT scans at this facility utilize dose modulation, iterative reconstruction, and/or weight based dosing when appropriate to reduce radiation dose to as low as reasonably achievable. Reason: FALL TECHNIQUE: Maxillofacial CT without IV contrast obtained with Coronal and sagittal reformations. COMPARISON: None FINDINGS: Negative for acute facial fracture. The mandible is intact. Cervical spine degenerative changes partially visualized. Paranasal sinuses and visualized mastoids and middle ears are clear. Mild soft tissue swelling affects the supraorbital right frontal scalp. Orbital soft tissues are otherwise normal. Visualized facial soft tissues are otherwise unremarkable. Intracranial compartment discussed on head CT report from same day. Coronal and sagittal reformations confirm no depressed orbital floor fracture. Severe bilateral temporomandibular joint osteoarthrosis present with anteriorly located ossified loose bodies. IMPRESSION: Negative for facial fracture. Electronically signed by:  Javier Gilbert MD  1/23/2022 6:54 PM CST Workstation: [id number]    X-Ray Chest AP Portable    Result Date: 1/23/2022  Reason: Chest Pain FALL FINDINGS: Portable chest at 1907 without comparisons shows normal cardiomediastinal silhouette. Lungs are clear. Pulmonary vasculature is normal. Degenerative changes  affect bilateral glenohumeral joints. IMPRESSION: No acute cardiopulmonary abnormality. Electronically signed by:  Javier Gilbert MD  1/23/2022 7:11 PM CST Workstation: [id number]      Assessment/Plan:     Active Hospital Problems    Diagnosis  POA    *Subarachnoid bleed [I60.9]  Yes     Priority: 1 - High    Atrial fibrillation [I48.91]  Yes    Gastroesophageal reflux disease without esophagitis [K21.9]  Yes    Hyperlipidemia [E78.5]  Yes    Depression [F32.A]  Yes      Resolved Hospital Problems   No resolved problems to display.       * Subarachnoid bleed  See HPI. Admit to ICU. Cardene gtt started in ED to SBP less than 150. Neurosurgery consult appreciated. FFP ordered. Repeat CT in 6 hour. Neuro checks. BP control. Labetalol PRN. PT/OT/SLP. Daily labs. F/E scale.      Depression  Continue chronic home medications       Hyperlipidemia  Continue Statin      Gastroesophageal reflux disease without esophagitis  Continue PPI      Atrial fibrillation  OAC held 2/2 fall with small volume of acute subarachnoid hemorrhage in left frontal parietal lobe sulci and sylvian fissure; and 6 mm acute left subdural hematoma. Continue Sotalol as prescribed. Monitor.         VTE Risk Mitigation (From admission, onward)         Ordered     Reason for No Pharmacological VTE Prophylaxis  Once        Question:  Reasons:  Answer:  Risk of Bleeding    01/23/22 2038     IP VTE HIGH RISK PATIENT  Once         01/23/22 2038     Place sequential compression device  Until discontinued         01/23/22 2038                   ERIC Bradley  Department of Hospital Medicine   [facility] - Emergency Dept

## 2022-01-24 NOTE — ASSESSMENT & PLAN NOTE
See HPI. Admit to ICU. Cardene gtt started in ED to SBP less than 150. Neurosurgery consult appreciated. FFP ordered. Repeat CT in 6 hour. Neuro checks. BP control. Labetalol PRN. PT/OT/SLP. Daily labs. F/E scale.

## 2022-01-24 NOTE — SUBJECTIVE & OBJECTIVE
Interval History:  See hospital course.  Patient states her  has pulmonary fibrosis, she tripped on his long oxygen cord while bringing him a meal, fell face forward with outstretched hands, denies any associated loss of consciousness.  Patient is on Eliquis, cardiology uses Dr. Mendoza.  Today does report headache, mostly on the right side, right upper extremity discomfort and generalized body soreness.  States she feels hungry.  Denies any shortness of breath, nausea or vomiting.  States she has been ambulating with steady gait to the bathroom.  She is off Cardene drip and blood pressure is currently controlled.  Afebrile with T-max 98.7.  Labs with hemoglobin 12.9, sodium 133, repeat CT without any worsening/new changes.  EKG sinus bradycardia.  Troponin negative.  Discussed with patient.  ICU RN in room during my encounter.      Review of Systems   Constitutional: Negative for fever.        +hunger   Eyes: Negative for visual disturbance.   Respiratory: Negative for shortness of breath.    Cardiovascular: Negative for chest pain.   Gastrointestinal: Negative for nausea and vomiting.   Genitourinary: Negative for difficulty urinating.   Neurological: Positive for headaches.   Hematological: Bruises/bleeds easily.   Psychiatric/Behavioral: Negative for confusion.     Objective:     Vital Signs (Most Recent):  Temp: 97.9 °F (36.6 °C) (01/24/22 0800)  Pulse: (!) 58 (01/24/22 0900)  Resp: 14 (01/24/22 0900)  BP: (!) 126/58 (01/24/22 0900)  SpO2: 98 % (01/24/22 0900) Vital Signs (24h Range):  Temp:  [97.9 °F (36.6 °C)-98.7 °F (37.1 °C)] 97.9 °F (36.6 °C)  Pulse:  [55-74] 58  Resp:  [14-48] 14  SpO2:  [95 %-100 %] 98 %  BP: (103-212)/(50-84) 126/58     Weight: 65.8 kg (145 lb)  Body mass index is 25.69 kg/m².    Intake/Output Summary (Last 24 hours) at 1/24/2022 1039  Last data filed at 1/24/2022 0400  Gross per 24 hour   Intake 889 ml   Output --   Net 889 ml      Physical Exam  Vitals and nursing note  reviewed.   Constitutional:       Comments: Sitting in chair, alert   HENT:      Head:      Comments: Bruising with ecchymosis most prominent R per ioribital area     Mouth/Throat:      Mouth: Mucous membranes are moist.   Eyes:      Comments: Pupils about 4 mm bilaterally and reactive, denies any changes with EOMI   Cardiovascular:      Rate and Rhythm: Normal rate and regular rhythm.      Comments: No significant LE edema  Pulmonary:      Breath sounds: No wheezing.      Comments: On RA  Abdominal:      General: Bowel sounds are normal. There is no distension.      Palpations: Abdomen is soft.      Tenderness: There is no abdominal tenderness.   Skin:     Comments: Bruising periorbital and upper extremity   Neurological:      Mental Status: She is alert and oriented to person, place, and time.      Comments: Speech intact without any aphasia/dysarthria, power 5/5 all 4 extremities, sensation intact, following commands   Psychiatric:         Mood and Affect: Mood normal.         Significant Labs:   Blood Culture: No results for input(s): LABBLOO in the last 48 hours.  BMP:   Recent Labs   Lab 01/24/22  0450   *   *   K 3.5      CO2 22*   BUN 10   CREATININE 0.5   CALCIUM 8.8   MG 1.8     CBC:   Recent Labs   Lab 01/23/22 1928 01/24/22  0450   WBC 6.49 6.44  6.44   HGB 14.3 12.9  12.9   HCT 44.2 38.5  38.5    141*  141*     CMP:   Recent Labs   Lab 01/23/22 1928 01/24/22  0450   * 133*   K 3.9 3.5   CL 99 102   CO2 24 22*   * 111*   BUN 12 10   CREATININE 0.5 0.5   CALCIUM 9.2 8.8   PROT 6.8  --    ALBUMIN 4.3  --    BILITOT 0.6  --    ALKPHOS 41*  --    AST 16  --    ALT 15  --    ANIONGAP 10 9   EGFRNONAA >60.0 >60.0     Cardiac Markers: No results for input(s): CKMB, MYOGLOBIN, BNP, TROPISTAT in the last 48 hours.  Magnesium:   Recent Labs   Lab 01/24/22  0450   MG 1.8     POCT Glucose: No results for input(s): POCTGLUCOSE in the last 48 hours.  TSH:   Recent Labs    Lab 01/23/22 2059   TSH 4.370     Urine Culture: No results for input(s): LABURIN in the last 48 hours.  Urine Studies: No results for input(s): COLORU, APPEARANCEUA, PHUR, SPECGRAV, PROTEINUA, GLUCUA, KETONESU, BILIRUBINUA, OCCULTUA, NITRITE, UROBILINOGEN, LEUKOCYTESUR, RBCUA, WBCUA, BACTERIA, SQUAMEPITHEL, HYALINECASTS in the last 48 hours.    Invalid input(s): WRIGHTSUR    Significant Imaging: I have reviewed and interpreted all pertinent imaging results/findings within the past 24 hours.     X-Ray Elbow Complete Right    Result Date: 1/23/2022  EXAMINATION: XR ELBOW COMPLETE 3 VIEW RIGHT CLINICAL HISTORY: Fall FINDINGS: Four views of right elbow show no fracture, dislocation, or destructive osseous lesion. Soft tissues unremarkable.  Negative for joint effusion.  Plate and screws transfixing proximal right radius and ulna partially visualized.     No acute right elbow abnormality. Electronically signed by: Javier Gilbert MD Date:    01/23/2022 Time:    18:32    X-Ray Wrist Complete Right    Result Date: 1/23/2022  EXAMINATION: XR WRIST COMPLETE 3 VIEWS RIGHT CLINICAL HISTORY: Fall FINDINGS: Four views of right breast show no fracture, dislocation, or destructive osseous lesion. 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  Plate and screws about distal radius and ulna partially visualized.  Soft tissues are unremarkable.     No acute right wrist abnormality. Electronically signed by: Javier Gilbert MD Date:    01/23/2022 Time:    18:32    X-Ray Hand 3 View Right    Result Date: 1/23/2022  EXAMINATION: XR HAND COMPLETE 3 VIEW RIGHT CLINICAL HISTORY: FALL; Pain, unspecified FINDINGS: Three views of right hand show no fracture, dislocation, or destructive osseous lesion. Severe right 1st CMC joint osteoarthrosis is present with DIP joint osteoarthrosis diffusely.  Either severe joint space narrowing or ankylosis of right 1st IP joint occurs.  Soft tissues are unremarkable.     No acute right hand abnormality.  Electronically signed by: Javier Gilbert MD Date:    01/23/2022 Time:    18:31    CT Head Without Contrast    Result Date: 1/24/2022  EXAM DESCRIPTION: CT HEAD WITHOUT CONTRAST 1/24/2022 12:58 AM CST CLINICAL HISTORY: 76 years, Female, Stroke, follow up COMPARISON: 1/23/2022. FINDINGS: Multiple transaxial tomograms of the brain were obtained from the base of the skull to the vertex without contrast. 2-D multiplanar reformats and the coronal and sagittal plane were performed and reviewed. This exam was performed according to our departmental dose-optimization protocol, which includes automated exposure control, adjustment of the mA and/or kV according to patient size and/or use of iterative reconstruction technique. Brain parenchyma demonstrate mild prominence of the sulci and gyri are corresponding to mild brain atrophy. There is minimal periventricular white matter changes of microvascular ischemia. As was described yesterday study there is a small area of subarachnoid hemorrhage within the left frontal and parietal sulci and in the left sylvian fissure. Again there is a left subdural hemorrhage overlying the left frontal temporal lobe similar to prior study measuring 6 mm in thickness on axial image 29. There is no midline shift and/or mass effect. There are no new areas of hemorrhage. Lateral ventricles and cisterns displace normal appearance. The calvarium is intact with no evidence for fracture. The visualized portions of the paranasal sinuses and orbits demonstrate to be clear. IMPRESSION: Stable left subdural hemorrhage overlying the left frontal temporal lobe measuring 6 mm in thickness. Stable small area of subarachnoid hemorrhage within the left frontal and parietal sulci and in the left Sylvian fissure. No new areas of hemorrhage are identified. Mild brain atrophy and minimal periventricular white matter changes of microvascular ischemia. Electronically signed by:  Manjinder Louis MD  1/24/2022 1:02 AM CST  Workstation: [id number]    CT Head Without Contrast    Result Date: 1/23/2022  CMS MANDATED QUALITY DATA - CT RADIATION - 436 All CT scans at this facility utilize dose modulation, iterative reconstruction, and/or weight based dosing when appropriate to reduce radiation dose to as low as reasonably achievable. Reason: FALL TECHNIQUE: Head CT without IV contrast. COMPARISON: None FINDINGS: Small amount of acute subarachnoid hemorrhage lies in the left frontal parietal sulci and in the left sylvian fissure. Acute left subdural hematoma overlying the left frontotemporal lobe reaches maximal thickness of 6 mm. Mild chronic small vessel ischemic changes affect the periventricular white matter bilaterally. Old lacunar infarct suggested in left cerebellum. Calcification occurs in right gal. No midline shift. Ventricles and cisterns are maintained. Right frontal scalp soft tissue swelling occurs and supraorbital location. Calvarium is intact. Visualized sinuses are clear. IMPRESSION: 1. Small volume of acute subarachnoid hemorrhage in left frontal parietal lobe sulci and sylvian fissure. 2. 6 mm acute left subdural hematoma. 3. 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 RESULT NOTIFICATION: These observations were discussed by Dr. Gilbert with, and acknowledged by, Dr. VARGHESE at 1/23/2022 6:45 PM CST Electronically signed by:  Javier Gilbert MD  1/23/2022 6:48 PM CST Workstation: [id number]    CT Cervical Spine Without Contrast    Result Date: 1/23/2022  CMS MANDATED QUALITY DATA - CT RADIATION - 436 All CT scans at this facility utilize dose modulation, iterative reconstruction, and/or weight based dosing when appropriate to reduce radiation dose to as low as reasonably achievable. Reason: FALL TECHNIQUE: Cervical spine CT without IV contrast obtained with coronal and sagittal reformations. COMPARISON:None FINDINGS: Negative for fracture. No epidural hematoma or prevertebral  soft tissue swelling. Carotid artery calcifications, left greater than right, are evident. Minor biapical pleural parenchymal scarring is noted. At C2-C3, severe right facet joint osteoarthrosis. At C3-C4, moderate right facet joint hypertrophic change and ankylosis is noted along with minor left facet joint osteoarthrosis. At C4-C5, moderate bilateral facet joint osteoarthrosis results in minor left neural foramen narrowing. At C5-C6, posterior osteophytic ridge and moderate left facet joint osteoarthrosis results in severe left neural foramen narrowing. At C6-C7, posterior osteophytic ridge and moderate left and mild right facet joint osteoarthrosis results in moderate left neural foramen narrowing. At C7-T1, no narrowing. Coronal and sagittal reformations show normal alignment without abnormal facet widening. IMPRESSION: Cervical spine degenerative changes, without acute abnormality. Electronically signed by:  Javier Gilbert MD  1/23/2022 6:51 PM CST Workstation: [id number]    CT Maxillofacial Without Contrast    Result Date: 1/23/2022  CMS MANDATED QUALITY DATA - CT RADIATION - 436 All CT scans at this facility utilize dose modulation, iterative reconstruction, and/or weight based dosing when appropriate to reduce radiation dose to as low as reasonably achievable. Reason: FALL TECHNIQUE: Maxillofacial CT without IV contrast obtained with Coronal and sagittal reformations. COMPARISON: None FINDINGS: Negative for acute facial fracture. The mandible is intact. Cervical spine degenerative changes partially visualized. Paranasal sinuses and visualized mastoids and middle ears are clear. Mild soft tissue swelling affects the supraorbital right frontal scalp. Orbital soft tissues are otherwise normal. Visualized facial soft tissues are otherwise unremarkable. Intracranial compartment discussed on head CT report from same day. Coronal and sagittal reformations confirm no depressed orbital floor fracture. Severe bilateral  temporomandibular joint osteoarthrosis present with anteriorly located ossified loose bodies. IMPRESSION: Negative for facial fracture. Electronically signed by:  Javier Gilbert MD  1/23/2022 6:54 PM CST Workstation: [id number]    X-Ray Chest AP Portable    Result Date: 1/23/2022  Reason: Chest Pain FALL FINDINGS: Portable chest at 1907 without comparisons shows normal cardiomediastinal silhouette. Lungs are clear. Pulmonary vasculature is normal. Degenerative changes affect bilateral glenohumeral joints. IMPRESSION: No acute cardiopulmonary abnormality. Electronically signed by:  Javier Gilbert MD  1/23/2022 7:11 PM CST Workstation: [id number]  ECG Results          EKG 12-lead (In process)  Result time 01/24/22 05:18:31    In process by Interface, Lab In [facility] (01/24/22 05:18:31)                 Narrative:    Test Reason : R07.9,    Vent. Rate : 055 BPM     Atrial Rate : 055 BPM     P-R Int : 130 ms          QRS Dur : 096 ms      QT Int : 406 ms       P-R-T Axes : 001 032 065 degrees     QTc Int : 388 ms    Sinus bradycardia  Nonspecific ST and T wave abnormality  Abnormal ECG  When compared with ECG of 12-OCT-2020 15:01,  ST now depressed in Lateral leads  T wave inversion no longer evident in Inferior leads  Nonspecific T wave abnormality now evident in Lateral leads  QT has shortened    Referred By: [email or web address]   SELF           Confirmed By:

## 2022-01-24 NOTE — ED NOTES
76 YOF c/o injurys to face secondary to trip and fall today at 11am. Pt stated on Eliquis. Pt also c/o right elbow, wrist and hand pain 2/10. LAC noted to right eyebrow and right ecchymosis. Dry blood noted to lower lip. -LOC. LAC also noted to left face- pt stated injury X 1wk. Denies any other complaints.     Adult Physical Assessment  LOC: Patient is awake, alert, oriented and speaking appropriately at this time.  APPEARANCE: Patient resting comfortably and appears to be in no acute distress at this time. Patient is clean and well groomed, patient's clothing is properly fastened.  SKIN:The skin is warm and dry, color consistent with ethnicity, patient has normal skin turgor and moist mucus membranes, skin intact, no breakdown or brusing noted.  MUSCULOSKELETAL: Patient moving all extremities well, no obvious swelling or deformities noted.  RESPIRATORY: Airway is open and patent, respirations are spontaneous, patient has a normal effort and rate, no accessory muscle use noted.  CARDIAC: Patient has a normal rate and rhythm, no periphreal edema noted in any extremity, capillary refill < 3 seconds in all extremities.  ABDOMEN: Soft and non tender to palpation, no abdominal distention noted.  NEUROLOGIC: Eyes open spontaneously, behavior appropriate to situation, follows commands, facial expression symmetrical, bilateral hand grasp equal and even, purposeful motor response noted, normal sensation in all extremities when touched with a finger.      VSS. ED workup in progress. Call light within pt reach. Safety measures in place: side rails up X2. Will continue to monitor.

## 2022-01-24 NOTE — ASSESSMENT & PLAN NOTE
OAC held 2/2 fall with small volume of acute subarachnoid hemorrhage in left frontal parietal lobe sulci and sylvian fissure; and 6 mm acute left subdural hematoma. Continue Sotalol as prescribed. Monitor.

## 2022-01-24 NOTE — CARE UPDATE
01/24/22 0934   PRE-TX-O2   O2 Device (Oxygen Therapy) room air   Pulse Oximetry Type Continuous   $ Pulse Oximetry - Multiple Charge Pulse Oximetry - Multiple   Respiratory Evaluation   $ Care Plan Tech Time 15 min

## 2022-01-24 NOTE — PROGRESS NOTES
Formerly [facility], later [facility] Medicine  Progress Note    Patient Name: Madisyn Velasquez  MRN: [id number]  Patient Class: IP- Inpatient   Admission Date: 1/23/2022  Length of Stay: 1 days  Attending Physician: Rachel Ta MD  Primary Care Provider: Lamar Spence MD        Subjective:     Principal Problem:SDH (subdural hematoma)        HPI:  Madisyn Velasquez is a 76 y.o. female with a history as  has a past medical history of Anxiety and Constipation. who presented to the ED with a Fall (TRIP AND FALL OVER O2 TUBING, NO LOC), Facial Pain, Head Injury (NO LOC), Elbow Injury (RT), and WRIST AND HAND (RT). Patient reports falling forward hitting her head and face. She c/o right eye and facial pain, headache, right wrist and right elbow pain. Further reports speaking to PCP who advise her to come to ED for further evaluation 2/2 her being on OAC. She denies fever, chills, diaphoresis, SOB, dizziness, chest pain, palpitations, NVD, LOC or any other associated symptoms.  Labs and image reviewed. CBC unremarkable. CM unimpressive. CXR without acute cardiopulmonary abnormality.  CT maxillofacial without acute facial fracture. CT Cervical spine without acute abnormalities. 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  XR right elbow without acute right elbow abnormality. XR right wrist without acute right wrist abnormality.  XR right hand without acute right hand abnormality. Per ED doctor, noted to have elevaeted BP given IV labetalol. ED physician also reports speaking with Neurourgery with recs for repeat CT n 6 hours and FFP. Place in ICU for neuro checks and close monitoring.     Of note, prior to transferring to ICU, cardene gtt started for BP control.            Overview/Hospital Course:  Assumed care  of this patient on 1/24/22.  Patient with a history of paroxysmal atrial fibrillation, followed by cardiologist Dr. Mendoza, on sotalol and Eliquis.  Patient presented with fall at home, states her  has pulmonary fibrosis and is on home oxygen, she was bringing a meal to him when she tripped on oxygen cord and fell face forward with outstretched hands.  Denies any associated loss of consciousness.  Due to being on anticoagulation she was instructed to present to the ED.  Blood pressure 203/84 on arrival, EKG sinus bradycardia, no signs of infection, CT head with small left frontal subarachnoid hemorrhage, 6 mm left acute subdural hematoma, 6 mm, no associated midline shift.  She received FFP and 10 mg vitamin K as per Neurosurgery recommendations, she was admitted to the ICU, Cardene drip for blood pressure control, discontinuing of Eliquis, serial neuro checks, holding of Eliquis.  On 01/24 complaining of headache, mostly on right side, right upper extremity discomfort, states she feels hungry, repeat CT without any worsening.      Interval History:  See hospital course.  Patient states her  has pulmonary fibrosis, she tripped on his long oxygen cord while bringing him a meal, fell face forward with outstretched hands, denies any associated loss of consciousness.  Patient is on Eliquis, cardiology uses Dr. Mendoza.  Today does report headache, mostly on the right side, right upper extremity discomfort and generalized body soreness.  States she feels hungry.  Denies any shortness of breath, nausea or vomiting.  States she has been ambulating with steady gait to the bathroom.  She is off Cardene drip and blood pressure is currently controlled.  Afebrile with T-max 98.7.  Labs with hemoglobin 12.9, sodium 133, repeat CT without any worsening/new changes.  EKG sinus bradycardia.  Troponin negative.  Discussed with patient.  ICU RN in room during my encounter.      Review of Systems   Constitutional:  Negative for fever.        +hunger   Eyes: Negative for visual disturbance.   Respiratory: Negative for shortness of breath.    Cardiovascular: Negative for chest pain.   Gastrointestinal: Negative for nausea and vomiting.   Genitourinary: Negative for difficulty urinating.   Neurological: Positive for headaches.   Hematological: Bruises/bleeds easily.   Psychiatric/Behavioral: Negative for confusion.     Objective:     Vital Signs (Most Recent):  Temp: 97.9 °F (36.6 °C) (01/24/22 0800)  Pulse: (!) 58 (01/24/22 0900)  Resp: 14 (01/24/22 0900)  BP: (!) 126/58 (01/24/22 0900)  SpO2: 98 % (01/24/22 0900) Vital Signs (24h Range):  Temp:  [97.9 °F (36.6 °C)-98.7 °F (37.1 °C)] 97.9 °F (36.6 °C)  Pulse:  [55-74] 58  Resp:  [14-48] 14  SpO2:  [95 %-100 %] 98 %  BP: (103-212)/(50-84) 126/58     Weight: 65.8 kg (145 lb)  Body mass index is 25.69 kg/m².    Intake/Output Summary (Last 24 hours) at 1/24/2022 1039  Last data filed at 1/24/2022 0400  Gross per 24 hour   Intake 889 ml   Output --   Net 889 ml      Physical Exam  Vitals and nursing note reviewed.   Constitutional:       Comments: Sitting in chair, alert   HENT:      Head:      Comments: Bruising with ecchymosis most prominent R per ioribital area     Mouth/Throat:      Mouth: Mucous membranes are moist.   Eyes:      Comments: Pupils about 4 mm bilaterally and reactive, denies any changes with EOMI   Cardiovascular:      Rate and Rhythm: Normal rate and regular rhythm.      Comments: No significant LE edema  Pulmonary:      Breath sounds: No wheezing.      Comments: On RA  Abdominal:      General: Bowel sounds are normal. There is no distension.      Palpations: Abdomen is soft.      Tenderness: There is no abdominal tenderness.   Skin:     Comments: Bruising periorbital and upper extremity   Neurological:      Mental Status: She is alert and oriented to person, place, and time.      Comments: Speech intact without any aphasia/dysarthria, power 5/5 all 4 extremities,  sensation intact, following commands   Psychiatric:         Mood and Affect: Mood normal.         Significant Labs:   Blood Culture: No results for input(s): LABBLOO in the last 48 hours.  BMP:   Recent Labs   Lab 01/24/22  0450   *   *   K 3.5      CO2 22*   BUN 10   CREATININE 0.5   CALCIUM 8.8   MG 1.8     CBC:   Recent Labs   Lab 01/23/22 1928 01/24/22  0450   WBC 6.49 6.44  6.44   HGB 14.3 12.9  12.9   HCT 44.2 38.5  38.5    141*  141*     CMP:   Recent Labs   Lab 01/23/22 1928 01/24/22  0450   * 133*   K 3.9 3.5   CL 99 102   CO2 24 22*   * 111*   BUN 12 10   CREATININE 0.5 0.5   CALCIUM 9.2 8.8   PROT 6.8  --    ALBUMIN 4.3  --    BILITOT 0.6  --    ALKPHOS 41*  --    AST 16  --    ALT 15  --    ANIONGAP 10 9   EGFRNONAA >60.0 >60.0     Cardiac Markers: No results for input(s): CKMB, MYOGLOBIN, BNP, TROPISTAT in the last 48 hours.  Magnesium:   Recent Labs   Lab 01/24/22  0450   MG 1.8     POCT Glucose: No results for input(s): POCTGLUCOSE in the last 48 hours.  TSH:   Recent Labs   Lab 01/23/22 2059   TSH 4.370     Urine Culture: No results for input(s): LABURIN in the last 48 hours.  Urine Studies: No results for input(s): COLORU, APPEARANCEUA, PHUR, SPECGRAV, PROTEINUA, GLUCUA, KETONESU, BILIRUBINUA, OCCULTUA, NITRITE, UROBILINOGEN, LEUKOCYTESUR, RBCUA, WBCUA, BACTERIA, SQUAMEPITHEL, HYALINECASTS in the last 48 hours.    Invalid input(s): WRIGHTSUR    Significant Imaging: I have reviewed and interpreted all pertinent imaging results/findings within the past 24 hours.     X-Ray Elbow Complete Right    Result Date: 1/23/2022  EXAMINATION: XR ELBOW COMPLETE 3 VIEW RIGHT CLINICAL HISTORY: Fall FINDINGS: Four views of right elbow show no fracture, dislocation, or destructive osseous lesion. Soft tissues unremarkable.  Negative for joint effusion.  Plate and screws transfixing proximal right radius and ulna partially visualized.     No acute right elbow abnormality.  Electronically signed by: Javier Gilbert MD Date:    01/23/2022 Time:    18:32    X-Ray Wrist Complete Right    Result Date: 1/23/2022  EXAMINATION: XR WRIST COMPLETE 3 VIEWS RIGHT CLINICAL HISTORY: Fall FINDINGS: Four views of right breast show no fracture, dislocation, or destructive osseous lesion. 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  Plate and screws about distal radius and ulna partially visualized.  Soft tissues are unremarkable.     No acute right wrist abnormality. Electronically signed by: Javier Gilbert MD Date:    01/23/2022 Time:    18:32    X-Ray Hand 3 View Right    Result Date: 1/23/2022  EXAMINATION: XR HAND COMPLETE 3 VIEW RIGHT CLINICAL HISTORY: FALL; Pain, unspecified FINDINGS: Three views of right hand show no fracture, dislocation, or destructive osseous lesion. Severe right 1st CMC joint osteoarthrosis is present with DIP joint osteoarthrosis diffusely.  Either severe joint space narrowing or ankylosis of right 1st IP joint occurs.  Soft tissues are unremarkable.     No acute right hand abnormality. Electronically signed by: Javier Gilbert MD Date:    01/23/2022 Time:    18:31    CT Head Without Contrast    Result Date: 1/24/2022  EXAM DESCRIPTION: CT HEAD WITHOUT CONTRAST 1/24/2022 12:58 AM CST CLINICAL HISTORY: 76 years, Female, Stroke, follow up COMPARISON: 1/23/2022. FINDINGS: Multiple transaxial tomograms of the brain were obtained from the base of the skull to the vertex without contrast. 2-D multiplanar reformats and the coronal and sagittal plane were performed and reviewed. This exam was performed according to our departmental dose-optimization protocol, which includes automated exposure control, adjustment of the mA and/or kV according to patient size and/or use of iterative reconstruction technique. Brain parenchyma demonstrate mild prominence of the sulci and gyri are corresponding to mild brain atrophy. There is minimal periventricular white matter changes of microvascular  ischemia. As was described yesterday study there is a small area of subarachnoid hemorrhage within the left frontal and parietal sulci and in the left sylvian fissure. Again there is a left subdural hemorrhage overlying the left frontal temporal lobe similar to prior study measuring 6 mm in thickness on axial image 29. There is no midline shift and/or mass effect. There are no new areas of hemorrhage. Lateral ventricles and cisterns displace normal appearance. The calvarium is intact with no evidence for fracture. The visualized portions of the paranasal sinuses and orbits demonstrate to be clear. IMPRESSION: Stable left subdural hemorrhage overlying the left frontal temporal lobe measuring 6 mm in thickness. Stable small area of subarachnoid hemorrhage within the left frontal and parietal sulci and in the left Sylvian fissure. No new areas of hemorrhage are identified. Mild brain atrophy and minimal periventricular white matter changes of microvascular ischemia. Electronically signed by:  Manjinder Louis MD  1/24/2022 1:02 AM CST Workstation: [id number]    CT Head Without Contrast    Result Date: 1/23/2022  CMS MANDATED QUALITY DATA - CT RADIATION - 436 All CT scans at this facility utilize dose modulation, iterative reconstruction, and/or weight based dosing when appropriate to reduce radiation dose to as low as reasonably achievable. Reason: FALL TECHNIQUE: Head CT without IV contrast. COMPARISON: None FINDINGS: Small amount of acute subarachnoid hemorrhage lies in the left frontal parietal sulci and in the left sylvian fissure. Acute left subdural hematoma overlying the left frontotemporal lobe reaches maximal thickness of 6 mm. Mild chronic small vessel ischemic changes affect the periventricular white matter bilaterally. Old lacunar infarct suggested in left cerebellum. Calcification occurs in right gal. No midline shift. Ventricles and cisterns are maintained. Right frontal scalp soft tissue swelling occurs and  supraorbital location. Calvarium is intact. Visualized sinuses are clear. IMPRESSION: 1. Small volume of acute subarachnoid hemorrhage in left frontal parietal lobe sulci and sylvian fissure. 2. 6 mm acute left subdural hematoma. 3. 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 RESULT NOTIFICATION: These observations were discussed by Dr. Gilbert with, and acknowledged by, Dr. VARGHESE at 1/23/2022 6:45 PM CST Electronically signed by:  Javier Gilbert MD  1/23/2022 6:48 PM CST Workstation: [id number]    CT Cervical Spine Without Contrast    Result Date: 1/23/2022  CMS MANDATED QUALITY DATA - CT RADIATION - 436 All CT scans at this facility utilize dose modulation, iterative reconstruction, and/or weight based dosing when appropriate to reduce radiation dose to as low as reasonably achievable. Reason: FALL TECHNIQUE: Cervical spine CT without IV contrast obtained with coronal and sagittal reformations. COMPARISON:None FINDINGS: Negative for fracture. No epidural hematoma or prevertebral soft tissue swelling. Carotid artery calcifications, left greater than right, are evident. Minor biapical pleural parenchymal scarring is noted. At C2-C3, severe right facet joint osteoarthrosis. At C3-C4, moderate right facet joint hypertrophic change and ankylosis is noted along with minor left facet joint osteoarthrosis. At C4-C5, moderate bilateral facet joint osteoarthrosis results in minor left neural foramen narrowing. At C5-C6, posterior osteophytic ridge and moderate left facet joint osteoarthrosis results in severe left neural foramen narrowing. At C6-C7, posterior osteophytic ridge and moderate left and mild right facet joint osteoarthrosis results in moderate left neural foramen narrowing. At C7-T1, no narrowing. Coronal and sagittal reformations show normal alignment without abnormal facet widening. IMPRESSION: Cervical spine degenerative changes, without acute abnormality.  Electronically signed by:  Javier Gilbert MD  1/23/2022 6:51 PM CST Workstation: [id number]    CT Maxillofacial Without Contrast    Result Date: 1/23/2022  CMS MANDATED QUALITY DATA - CT RADIATION - 436 All CT scans at this facility utilize dose modulation, iterative reconstruction, and/or weight based dosing when appropriate to reduce radiation dose to as low as reasonably achievable. Reason: FALL TECHNIQUE: Maxillofacial CT without IV contrast obtained with Coronal and sagittal reformations. COMPARISON: None FINDINGS: Negative for acute facial fracture. The mandible is intact. Cervical spine degenerative changes partially visualized. Paranasal sinuses and visualized mastoids and middle ears are clear. Mild soft tissue swelling affects the supraorbital right frontal scalp. Orbital soft tissues are otherwise normal. Visualized facial soft tissues are otherwise unremarkable. Intracranial compartment discussed on head CT report from same day. Coronal and sagittal reformations confirm no depressed orbital floor fracture. Severe bilateral temporomandibular joint osteoarthrosis present with anteriorly located ossified loose bodies. IMPRESSION: Negative for facial fracture. Electronically signed by:  Javier Gilbert MD  1/23/2022 6:54 PM CST Workstation: [id number]    X-Ray Chest AP Portable    Result Date: 1/23/2022  Reason: Chest Pain FALL FINDINGS: Portable chest at 1907 without comparisons shows normal cardiomediastinal silhouette. Lungs are clear. Pulmonary vasculature is normal. Degenerative changes affect bilateral glenohumeral joints. IMPRESSION: No acute cardiopulmonary abnormality. Electronically signed by:  Javier Gilbert MD  1/23/2022 7:11 PM CST Workstation: [id number]  ECG Results          EKG 12-lead (In process)  Result time 01/24/22 05:18:31    In process by Interface, Lab In [facility] (01/24/22 05:18:31)                 Narrative:    Test Reason : R07.9,    Vent. Rate : 055 BPM     Atrial Rate : 055 BPM      P-R Int : 130 ms          QRS Dur : 096 ms      QT Int : 406 ms       P-R-T Axes : 001 032 065 degrees     QTc Int : 388 ms    Sinus bradycardia  Nonspecific ST and T wave abnormality  Abnormal ECG  When compared with ECG of 12-OCT-2020 15:01,  ST now depressed in Lateral leads  T wave inversion no longer evident in Inferior leads  Nonspecific T wave abnormality now evident in Lateral leads  QT has shortened    Referred By: [email or web address]   SELF           Confirmed By:                                   Assessment/Plan:     Active Hospital Problems    Diagnosis    *Acute traumatic left frontotemporal subdural hematoma, 6 mm    Acute traumatic left frontal SAH    Hyponatremia    Anticoagulated by anticoagulation treatment    Fall at home, initial encounter    pAF    Gastroesophageal reflux disease without esophagitis    Hyperlipidemia     Plan:  Continue care ICU with continuous cardiac telemetry monitoring  Continue to hold anticoagulation and antiplatelet therapy  Continue neuro checks  Stat repeat CT head for any change in neurological examination  Continue Keppra 500 mg b.i.d. for seizure prophylaxis  Attention to blood pressure, goal SBP less than 140  Start cardiac diet  Continue home dose of sotalol and monitoring blood pressure  Electrolytes sliding scale repletion  A.m. labs ordered  PT/OT evaluation pending  Appreciate neurosurgery input  Further plan as per clinical course    VTE Risk Mitigation (From admission, onward)         Ordered     Reason for No Pharmacological VTE Prophylaxis  Once        Question:  Reasons:  Answer:  Risk of Bleeding    01/23/22 2038     IP VTE HIGH RISK PATIENT  Once         01/23/22 2038     Place sequential compression device  Until discontinued         01/23/22 2038                Discharge Planning   TRAN:      Code Status: Full Code   Is the patient medically ready for discharge?:     Reason for patient still in hospital (select all that apply): Patient trending  condition                     Rachel Ta MD  Department of Hospital Medicine   [facility]

## 2022-01-24 NOTE — CARE UPDATE
Repeat CAT scan of the head shows the left hemispheric subdural hematoma remains stable with minimal or mild mass effect without midline shift.  The subarachnoid hemorrhage remained stable.  We will continue to follow conservatively.  May repeat another CT scan tomorrow.  Dr. Gale will be taking over the patient's care this week.

## 2022-01-24 NOTE — HPI
Madisyn Velasquez is a 76 y.o. female with a history as  has a past medical history of Anxiety and Constipation. who presented to the ED with a Fall (TRIP AND FALL OVER O2 TUBING, NO LOC), Facial Pain, Head Injury (NO LOC), Elbow Injury (RT), and WRIST AND HAND (RT). Patient reports falling forward hitting her head and face. She c/o right eye and facial pain, headache, right wrist and right elbow pain. Further reports speaking to PCP who advise her to come to ED for further evaluation 2/2 her being on OAC. She denies fever, chills, diaphoresis, SOB, dizziness, chest pain, palpitations, NVD, LOC or any other associated symptoms.  Labs and image reviewed. CBC unremarkable. CM unimpressive. CXR without acute cardiopulmonary abnormality.  CT maxillofacial without acute facial fracture. CT Cervical spine without acute abnormalities. 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  XR right elbow without acute right elbow abnormality. XR right wrist without acute right wrist abnormality.  XR right hand without acute right hand abnormality. Per ED doctor, noted to have elevaeted BP given IV labetalol. ED physician also reports speaking with Neurourgery with recs for repeat CT n 6 hours and FFP. Place in ICU for neuro checks and close monitoring.     Of note, prior to transferring to ICU, cardene gtt started for BP control.

## 2022-01-24 NOTE — CONSULTS
[facility]  Neurosurgery  Consult Note    Inpatient consult to Neurosurgery  Consult performed by: Raymond Hernandez MD  Consult ordered by: ERIC Real        Subjective:     Chief Complaint/Reason for Admission:  Consult for evaluation of traumatic subdural hematoma in subarachnoid hemorrhage    History of Present Illness:  This is a 76-year-old female on Eliquis status post fall earlier today a trip fall over some tubing.  Patient denies any loss of consciousness but she fell forward hitting her head face.  She was worked up and found to have a small left-sided frontal subdural with associated traumatic subarachnoid hemorrhage.    PTA Medications   Medication Sig    apixaban (ELIQUIS) 5 mg Tab Take 5 mg by mouth 2 (two) times daily.    baclofen (LIORESAL) 10 MG tablet Take 10 mg by mouth 2 (two) times daily.    buPROPion (WELLBUTRIN) 100 MG tablet Take 100 mg by mouth 2 (two) times daily.    clonazePAM (KLONOPIN) 1 MG tablet Take 1 mg by mouth 2 (two) times daily as needed for Anxiety.    EScitalopram oxalate (LEXAPRO) 20 MG tablet Take 20 mg by mouth once daily.    pantoprazole (PROTONIX) 40 MG tablet Take 40 mg by mouth once daily.    rosuvastatin (CRESTOR) 10 MG tablet Take 10 mg by mouth once daily.    sotaloL (BETAPACE) 80 MG tablet Take 40 mg by mouth 2 (two) times daily.       Review of patient's allergies indicates:  No Known Allergies    Past Medical History:   Diagnosis Date    Anxiety     Constipation      No past surgical history on file.  Family History    None       Tobacco Use    Smoking status: Never Smoker    Smokeless tobacco: Not on file   Substance and Sexual Activity    Alcohol use: Yes     Alcohol/week: 2.0 standard drinks     Types: 2 Glasses of wine per week    Drug use: Never    Sexual activity: Not on file     Review of Systems     Review of Systems   Constitutional: Negative for chills, diaphoresis and fever.   HENT: Negative for congestion,  postnasal drip, sinus pressure and sore throat.    Eyes: Positive for pain (right eye ) and visual disturbance.   Respiratory: Negative for cough, chest tightness, shortness of breath and wheezing.    Cardiovascular: Negative for chest pain, palpitations and leg swelling.   Gastrointestinal: Negative for abdominal distention, abdominal pain, blood in stool, constipation, diarrhea, nausea and vomiting.   Endocrine: Negative.    Genitourinary: Negative for dysuria.   Musculoskeletal: Negative.         Mechanical fall with injury to right right hand, elbow and wrist   Skin: Positive for wound (over right eye).   Allergic/Immunologic: Negative.    Neurological: Positive for headaches. Negative for dizziness, weakness and numbness.   Hematological: Negative.    Psychiatric/Behavioral: Negative.  Objective:     Weight: 65.8 kg (145 lb)  Body mass index is 25.69 kg/m².  Vital Signs (Most Recent):  Temp: 98.6 °F (37 °C) (01/23/22 2149)  Pulse: 71 (01/23/22 2230)  Resp: 20 (01/23/22 2230)  BP: (!) 158/69 (01/23/22 2230)  SpO2: 97 % (01/23/22 2230) Vital Signs (24h Range):  Temp:  [98.4 °F (36.9 °C)-98.7 °F (37.1 °C)] 98.6 °F (37 °C)  Pulse:  [55-71] 71  Resp:  [15-21] 20  SpO2:  [96 %-100 %] 97 %  BP: (130-212)/(61-84) 158/69     Date 01/23/22 0700 - 01/24/22 0659   Shift 2524-3557 3814-9859 6240-9600 24 Hour Total   INTAKE   Blood  189  189   Shift Total(mL/kg)  189(2.9)  189(2.9)   OUTPUT   Shift Total(mL/kg)       Weight (kg)  65.8 65.8 65.8                   Neurosurgery Physical Exam     GCS 15  Nonfocal exam      Significant Labs:  Recent Labs   Lab 01/23/22 1928   *   *   K 3.9   CL 99   CO2 24   BUN 12   CREATININE 0.5   CALCIUM 9.2     Recent Labs   Lab 01/23/22 1928   WBC 6.49   HGB 14.3   HCT 44.2        Recent Labs   Lab 01/23/22  1928   LABPT 13.5   INR 1.1   APTT 27.4     Microbiology Results (last 7 days)     ** No results found for the last 168 hours. **        All pertinent labs from  the last 24 hours have been reviewed.  Significant Diagnostics:  .  I have reviewed all pertinent imaging results/findings within the past 24 hours.  CT head  1. Small volume of acute subarachnoid hemorrhage in left frontal parietal lobe sulci and sylvian fissure.  2. 6 mm acute left subdural hematoma.  3. 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    CT of c-spine - Cervical spine degenerative changes, without acute abnormality.       Assessment/Plan:     Active Diagnoses:    Diagnosis Date Noted POA    PRINCIPAL PROBLEM:  Subarachnoid bleed [I60.9] 01/23/2022 Yes    Atrial fibrillation [I48.91] 01/23/2022 Yes    Gastroesophageal reflux disease without esophagitis [K21.9] 01/23/2022 Yes    Hyperlipidemia [E78.5] 01/23/2022 Yes    Depression [F32.A] 01/23/2022 Yes      Problems Resolved During this Admission:     76-year-old female on Eliquis status post fall with small left hemispheric subdural hematoma with traumatic subarachnoid hemorrhage.  S currently non operative.  But we will need to keep a close eye on her due to the anticoagulation.  We devised a team to give her FFP in vitamin K. we will repeat a CT scan in 6 hours in go from there.  Agree with ICU observation.  We will also put her on some Keppra.        Thank you for your consult. I will follow-up with patient. Please contact us if you have any additional questions.    Raymond Hernandez MD  Neurosurgery  [facility]

## 2022-01-24 NOTE — ASSESSMENT & PLAN NOTE
See HPI. Admit to ICU. Neurosurgery consulted. FFP ordered. Repeat CT in 6 hour. Neuro checks. BP control. Labetalol PRN. PT/OT/SLP. Daily labs. F/E scale.

## 2022-01-24 NOTE — SUBJECTIVE & OBJECTIVE
Past Medical History:   Diagnosis Date    Anxiety     Constipation        No past surgical history on file.    Review of patient's allergies indicates:  No Known Allergies    No current facility-administered medications on file prior to encounter.     Current Outpatient Medications on File Prior to Encounter   Medication Sig    apixaban (ELIQUIS) 5 mg Tab Take 5 mg by mouth 2 (two) times daily.    baclofen (LIORESAL) 10 MG tablet Take 10 mg by mouth 2 (two) times daily.    buPROPion (WELLBUTRIN) 100 MG tablet Take 100 mg by mouth 2 (two) times daily.    clonazePAM (KLONOPIN) 1 MG tablet Take 1 mg by mouth 2 (two) times daily as needed for Anxiety.    EScitalopram oxalate (LEXAPRO) 20 MG tablet Take 20 mg by mouth once daily.    pantoprazole (PROTONIX) 40 MG tablet Take 40 mg by mouth once daily.    rosuvastatin (CRESTOR) 10 MG tablet Take 10 mg by mouth once daily.    sotaloL (BETAPACE) 80 MG tablet Take 40 mg by mouth 2 (two) times daily.    [DISCONTINUED] celecoxib (CELEBREX) 200 MG capsule Take 200 mg by mouth 2 (two) times daily.    [DISCONTINUED] metoprolol succinate (TOPROL-XL) 50 MG 24 hr tablet Take 50 mg by mouth once daily.     Family History    None       Tobacco Use    Smoking status: Never Smoker    Smokeless tobacco: Not on file   Substance and Sexual Activity    Alcohol use: Yes     Alcohol/week: 2.0 standard drinks     Types: 2 Glasses of wine per week    Drug use: Never    Sexual activity: Not on file     Review of Systems   Constitutional: Negative for chills, diaphoresis and fever.   HENT: Negative for congestion, postnasal drip, sinus pressure and sore throat.    Eyes: Positive for pain (right eye ) and visual disturbance.   Respiratory: Negative for cough, chest tightness, shortness of breath and wheezing.    Cardiovascular: Negative for chest pain, palpitations and leg swelling.   Gastrointestinal: Negative for abdominal distention, abdominal pain, blood in stool, constipation,  diarrhea, nausea and vomiting.   Endocrine: Negative.    Genitourinary: Negative for dysuria.   Musculoskeletal: Negative.         Mechanical fall with injury to right right hand, elbow and wrist   Skin: Positive for wound (over right eye).   Allergic/Immunologic: Negative.    Neurological: Positive for headaches. Negative for dizziness, weakness and numbness.   Hematological: Negative.    Psychiatric/Behavioral: Negative.      Objective:     Vital Signs (Most Recent):  Temp: 98.7 °F (37.1 °C) (01/23/22 1806)  Pulse: (!) 56 (01/23/22 2024)  Resp: 17 (01/23/22 2024)  BP: (!) 159/70 (01/23/22 2050)  SpO2: 97 % (01/23/22 2024) Vital Signs (24h Range):  Temp:  [98.7 °F (37.1 °C)] 98.7 °F (37.1 °C)  Pulse:  [55-67] 56  Resp:  [15-20] 17  SpO2:  [96 %-99 %] 97 %  BP: (159-212)/(70-84) 159/70     Weight: 65.8 kg (145 lb)  Body mass index is 25.69 kg/m².    Physical Exam  Constitutional:       General: She is not in acute distress.     Appearance: Normal appearance. She is well-developed and well-nourished. She is not toxic-appearing or diaphoretic.   HENT:      Head: Normocephalic and atraumatic.   Eyes:      General: Lids are normal.      Conjunctiva/sclera: Conjunctivae normal.      Pupils: Pupils are equal, round, and reactive to light.      Comments: Right eye swelling and ecchymosis    Neck:      Thyroid: No thyroid mass or thyromegaly.      Vascular: Normal carotid pulses. No JVD.      Trachea: Trachea normal. No tracheal deviation.   Cardiovascular:      Rate and Rhythm: Normal rate and regular rhythm.      Pulses: Normal pulses.      Heart sounds: Normal heart sounds, S1 normal and S2 normal.   Pulmonary:      Effort: Pulmonary effort is normal.      Breath sounds: Normal breath sounds. No stridor.   Abdominal:      General: Bowel sounds are normal.      Palpations: Abdomen is soft.      Tenderness: There is no abdominal tenderness.   Musculoskeletal:         General: Normal range of motion.      Cervical back:  Full passive range of motion without pain, normal range of motion and neck supple.      Comments: Tenderness at right arm and hand    Skin:     General: Skin is warm, dry and intact.      Nails: There is no clubbing or cyanosis.   Neurological:      Mental Status: She is alert and oriented to person, place, and time.      Cranial Nerves: No cranial nerve deficit.      Sensory: No sensory deficit.      Deep Tendon Reflexes: Strength normal.   Psychiatric:         Mood and Affect: Mood and affect normal.         Speech: Speech normal.         Behavior: Behavior normal. Behavior is cooperative.         Thought Content: Thought content normal.         Cognition and Memory: Cognition and memory normal.         Judgment: Judgment normal.           CRANIAL NERVES     CN III, IV, VI   Pupils are equal, round, and reactive to light.       Significant Labs:   All pertinent labs within the past 24 hours have been reviewed.  Bilirubin:   Recent Labs   Lab 01/23/22 1928   BILITOT 0.6     BMP:   Recent Labs   Lab 01/23/22 1928   *   *   K 3.9   CL 99   CO2 24   BUN 12   CREATININE 0.5   CALCIUM 9.2     CBC:   Recent Labs   Lab 01/23/22 1928   WBC 6.49   HGB 14.3   HCT 44.2        CMP:   Recent Labs   Lab 01/23/22 1928   *   K 3.9   CL 99   CO2 24   *   BUN 12   CREATININE 0.5   CALCIUM 9.2   PROT 6.8   ALBUMIN 4.3   BILITOT 0.6   ALKPHOS 41*   AST 16   ALT 15   ANIONGAP 10   EGFRNONAA >60.0     Coagulation:   Recent Labs   Lab 01/23/22 1928   INR 1.1   APTT 27.4     Troponin:   Recent Labs   Lab 01/23/22 1928   TROPONINI <0.030       Significant Imaging: I have reviewed all pertinent imaging results/findings within the past 24 hours.   X-Ray Elbow Complete Right    Result Date: 1/23/2022  EXAMINATION: XR ELBOW COMPLETE 3 VIEW RIGHT CLINICAL HISTORY: Fall FINDINGS: Four views of right elbow show no fracture, dislocation, or destructive osseous lesion. Soft tissues unremarkable.  Negative for  joint effusion.  Plate and screws transfixing proximal right radius and ulna partially visualized.     No acute right elbow abnormality. Electronically signed by: Javier Gilbert MD Date:    01/23/2022 Time:    18:32    X-Ray Wrist Complete Right    Result Date: 1/23/2022  EXAMINATION: XR WRIST COMPLETE 3 VIEWS RIGHT CLINICAL HISTORY: Fall FINDINGS: Four views of right breast show no fracture, dislocation, or destructive osseous lesion. 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  Plate and screws about distal radius and ulna partially visualized.  Soft tissues are unremarkable.     No acute right wrist abnormality. Electronically signed by: Javier Gilbert MD Date:    01/23/2022 Time:    18:32    X-Ray Hand 3 View Right    Result Date: 1/23/2022  EXAMINATION: XR HAND COMPLETE 3 VIEW RIGHT CLINICAL HISTORY: FALL; Pain, unspecified FINDINGS: Three views of right hand show no fracture, dislocation, or destructive osseous lesion. Severe right 1st CMC joint osteoarthrosis is present with DIP joint osteoarthrosis diffusely.  Either severe joint space narrowing or ankylosis of right 1st IP joint occurs.  Soft tissues are unremarkable.     No acute right hand abnormality. Electronically signed by: Javier Gilbert MD Date:    01/23/2022 Time:    18:31    CT Head Without Contrast    Result Date: 1/23/2022  CMS MANDATED QUALITY DATA - CT RADIATION - 436 All CT scans at this facility utilize dose modulation, iterative reconstruction, and/or weight based dosing when appropriate to reduce radiation dose to as low as reasonably achievable. Reason: FALL TECHNIQUE: Head CT without IV contrast. COMPARISON: None FINDINGS: Small amount of acute subarachnoid hemorrhage lies in the left frontal parietal sulci and in the left sylvian fissure. Acute left subdural hematoma overlying the left frontotemporal lobe reaches maximal thickness of 6 mm. Mild chronic small vessel ischemic changes affect the periventricular white matter bilaterally. Old  lacunar infarct suggested in left cerebellum. Calcification occurs in right gal. No midline shift. Ventricles and cisterns are maintained. Right frontal scalp soft tissue swelling occurs and supraorbital location. Calvarium is intact. Visualized sinuses are clear. IMPRESSION: 1. Small volume of acute subarachnoid hemorrhage in left frontal parietal lobe sulci and sylvian fissure. 2. 6 mm acute left subdural hematoma. 3. 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 RESULT NOTIFICATION: These observations were discussed by Dr. Gilbert with, and acknowledged by, Dr. VARGHESE at 1/23/2022 6:45 PM CST Electronically signed by:  Javier Gilbert MD  1/23/2022 6:48 PM CST Workstation: [id number]    CT Cervical Spine Without Contrast    Result Date: 1/23/2022  CMS MANDATED QUALITY DATA - CT RADIATION - 436 All CT scans at this facility utilize dose modulation, iterative reconstruction, and/or weight based dosing when appropriate to reduce radiation dose to as low as reasonably achievable. Reason: FALL TECHNIQUE: Cervical spine CT without IV contrast obtained with coronal and sagittal reformations. COMPARISON:None FINDINGS: Negative for fracture. No epidural hematoma or prevertebral soft tissue swelling. Carotid artery calcifications, left greater than right, are evident. Minor biapical pleural parenchymal scarring is noted. At C2-C3, severe right facet joint osteoarthrosis. At C3-C4, moderate right facet joint hypertrophic change and ankylosis is noted along with minor left facet joint osteoarthrosis. At C4-C5, moderate bilateral facet joint osteoarthrosis results in minor left neural foramen narrowing. At C5-C6, posterior osteophytic ridge and moderate left facet joint osteoarthrosis results in severe left neural foramen narrowing. At C6-C7, posterior osteophytic ridge and moderate left and mild right facet joint osteoarthrosis results in moderate left neural foramen narrowing. At  C7-T1, no narrowing. Coronal and sagittal reformations show normal alignment without abnormal facet widening. IMPRESSION: Cervical spine degenerative changes, without acute abnormality. Electronically signed by:  Javier Gilbret MD  1/23/2022 6:51 PM CST Workstation: [id number]    CT Maxillofacial Without Contrast    Result Date: 1/23/2022  CMS MANDATED QUALITY DATA - CT RADIATION - 436 All CT scans at this facility utilize dose modulation, iterative reconstruction, and/or weight based dosing when appropriate to reduce radiation dose to as low as reasonably achievable. Reason: FALL TECHNIQUE: Maxillofacial CT without IV contrast obtained with Coronal and sagittal reformations. COMPARISON: None FINDINGS: Negative for acute facial fracture. The mandible is intact. Cervical spine degenerative changes partially visualized. Paranasal sinuses and visualized mastoids and middle ears are clear. Mild soft tissue swelling affects the supraorbital right frontal scalp. Orbital soft tissues are otherwise normal. Visualized facial soft tissues are otherwise unremarkable. Intracranial compartment discussed on head CT report from same day. Coronal and sagittal reformations confirm no depressed orbital floor fracture. Severe bilateral temporomandibular joint osteoarthrosis present with anteriorly located ossified loose bodies. IMPRESSION: Negative for facial fracture. Electronically signed by:  Javier Gilbert MD  1/23/2022 6:54 PM CST Workstation: [id number]    X-Ray Chest AP Portable    Result Date: 1/23/2022  Reason: Chest Pain FALL FINDINGS: Portable chest at 1907 without comparisons shows normal cardiomediastinal silhouette. Lungs are clear. Pulmonary vasculature is normal. Degenerative changes affect bilateral glenohumeral joints. IMPRESSION: No acute cardiopulmonary abnormality. Electronically signed by:  Javier Gilbert MD  1/23/2022 7:11 PM CST Workstation: [id number]

## 2022-01-24 NOTE — CONSULTS
"[facility]  Adult Nutrition   Consult Note (Initial Assessment)     SUMMARY     Recommendations  Recommendation/Intervention: 1. Continue cardiac diet and encourage adequate intake. 2. RD obtained patient's food preferences, notified kitchen, and added preferences to [facility].  Goals: 1. Patient to meet at least 75% of estimated energy and protein needs.  Nutrition Goal Status: new  Communication of RD Recs: reviewed with RN    Dietitian Rounds Brief  Patient assessed 2' consult to assess dietary needs. Traumatic subdural hematoma in subarachnoid hemorrhage. Diet started today. PO intake good per patient. No issues.     Reason for Assessment  Reason For Assessment: consult  Diagnosis: hemorrhage  Relevant Medical History: A fib; subarachnoid bleed; GERD; HLD; depression  Interdisciplinary Rounds: attended    Nutrition Risk Screen  Nutrition Risk Screen: no indicators present    Nutrition/Diet History  Spiritual, Cultural Beliefs, [employer]al Practices, Values that Affect Care: no  Factors Affecting Nutritional Intake: NPO    Anthropometrics  Temp: 98 °F (36.7 °C)  Height Method: Stated  Height: 5' 3" (160 cm)  Height (inches): 63 in  Weight Method: Stated  Weight: 65.8 kg (145 lb)  Weight (lb): 145 lb  Ideal Body Weight (IBW), Female: 115 lb  % Ideal Body Weight, Female (lb): 126.09 %  BMI (Calculated): 25.7  BMI Grade: 25 - 29.9 - overweight  Weight Loss: unintentional       Weight History:  Wt Readings from Last 10 Encounters:   01/23/22 65.8 kg (145 lb)   10/12/20 65.8 kg (145 lb)       Lab/Procedures/Meds: Pertinent Labs Reviewed    Clinical Chemistry:  Recent Labs   Lab 01/23/22 1928 01/23/22 1928 01/24/22  0450   *   < > 133*   K 3.9   < > 3.5   CL 99   < > 102   CO2 24   < > 22*   *   < > 111*   BUN 12   < > 10   CREATININE 0.5   < > 0.5   CALCIUM 9.2   < > 8.8   PROT 6.8  --   --    ALBUMIN 4.3  --   --    BILITOT 0.6  --   --    ALKPHOS 41*  --   --    AST 16  --   --  "   ALT 15  --   --    ANIONGAP 10   < > 9   ESTGFRAFRICA >60.0   < > >60.0   EGFRNONAA >60.0   < > >60.0   MG  --   --  1.8    < > = values in this interval not displayed.       CBC:   Recent Labs   Lab 01/24/22  0450   WBC 6.44  6.44   RBC 4.22  4.22   HGB 12.9  12.9   HCT 38.5  38.5   *  141*   MCV 91  91   MCH 30.6  30.6   MCHC 33.5  33.5       Lipid Panel:  Recent Labs   Lab 01/24/22  0450   CHOL 145   HDL 68   LDLCALC 66.4   TRIG 53   CHOLHDL 46.9       Cardiac Profile:  Recent Labs   Lab 01/23/22  1928   CPK 61   CPKMB 2.7   TROPONINI <0.030       Thyroid & Parathyroid:  Recent Labs   Lab 01/23/22 2059   TSH 4.370     Medications: Pertinent Medications reviewed    Scheduled Meds:   atorvastatin  40 mg Oral QHS    buPROPion  100 mg Oral BID    chlorhexidine  15 mL Mouth/Throat BID    EScitalopram oxalate  20 mg Oral Daily    levETIRAcetam  500 mg Oral BID    mupirocin   Nasal BID    pantoprazole  40 mg Intravenous Daily    polyethylene glycol  17 g Oral Daily    sotaloL  40 mg Oral BID       Continuous Infusions:   niCARdipine 2.5 mg/hr (01/24/22 0205)    sodium chloride 0.9%         PRN Meds:.acetaminophen, calcium gluconate IVPB, calcium gluconate IVPB, calcium gluconate IVPB, clonazePAM, HYDROcodone-acetaminophen, labetalol, magnesium sulfate IVPB, magnesium sulfate IVPB, ondansetron, potassium chloride in water **AND** potassium chloride in water **AND** potassium chloride in water, sodium chloride 0.9%, sodium chloride 0.9%    Estimated/Assessed Needs  Weight Used For Calorie Calculations: 65.8 kg (145 lb 1 oz)  Energy Calorie Requirements (kcal): 1645 - 1974 (25 - 30 kcal/kg)  Energy Need Method: Kcal/kg  Protein Requirements: 99 132 (1.5 - 2 g/kg)  Weight Used For Protein Calculations: 65.8 kg (145 lb 1 oz)     Estimated Fluid Requirement Method: RDA Method  RDA Method (mL): 1645       Nutrition Prescription Ordered  Current Diet Order: Cardiac    Evaluation of Received  Nutrient/Fluid Intake  Energy Calories Required: meeting needs  Protein Required: meeting needs  Fluid Required: meeting needs  Tolerance: tolerating     Intake/Output Summary (Last 24 hours) at 1/24/2022 1612  Last data filed at 1/24/2022 0400  Gross per 24 hour   Intake 889 ml   Output --   Net 889 ml      % Intake of Estimated Energy Needs: 75 - 100 %  % Meal Intake: 75 - 100 %    Nutrition Risk  Level of Risk/Frequency of Follow-up: moderate - high     Monitor and Evaluation  Food and Nutrient Intake: energy intake  Food and Nutrient Adminstration: diet order  Physical Activity and Function: nutrition-related ADLs and IADLs,factors affecting access to physical activity  Anthropometric Measurements: body mass index,weight change,weight  Biochemical Data, Medical Tests and Procedures: electrolyte and renal panel,inflammatory profile,gastrointestinal profile,lipid profile,glucose/endocrine profile  Nutrition-Focused Physical Findings: overall appearance     Nutrition Follow-Up  RD Follow-up?: Yes    Taylor Marin RD 01/24/2022 4:12 PM

## 2022-01-24 NOTE — PLAN OF CARE
[facility]  Initial Discharge Assessment       Primary Care Provider: Lamar Spence MD    Admission Diagnosis: Blunt head trauma, initial encounter [S09.8XXA]    Admission Date: 1/23/2022  Expected Discharge Date: 1/26/2022    Discharge Barriers Identified: None     Assessment completed at bedside.  No advanced directives or POA at this time.  Patient intends to discharge home with PT/OT, either with hh or OP.  No other needs identified  At this time    Payor: [employer] MANAGED MEDICARE / Plan: [employer] MEDICARE HMO / Product Type: Capitation /     Extended Emergency Contact Information  Primary Emergency Contact: Ayanna Velasquez  Mobile Phone: [phone]  Relation: Spouse   needed? No  Secondary Emergency Contact: Viri Zaman  Mobile Phone: [phone]  Relation: Other    Discharge Plan A: Home  Discharge Plan B: Home with family,Home Health      [id number] - [place] - [address] AT [facility] [address]  [address]  [place] [address]  Phone: [phone] Fax: [phone]      Initial Assessment (most recent)     Adult Discharge Assessment - 01/24/22 1445        Discharge Assessment    Assessment Type Discharge Planning Assessment     Confirmed/corrected address, phone number and insurance Yes     Confirmed Demographics Correct on Facesheet     Source of Information patient     When was your last doctors appointment? --   last week    Communicated TRAN with patient/caregiver Yes     Reason For Admission blunt head trauma     Lives With spouse     Facility Arrived From: home     Do you expect to return to your current living situation? Yes     Do you have help at home or someone to help you manage your care at home? Yes     Who are your caregiver(s) and their phone number(s)? ayannavinny Velasquez [phone]     Prior to hospitilization cognitive status: Alert/Oriented     Current cognitive status: Alert/Oriented     Walking or Climbing  Stairs Difficulty none     Dressing/Bathing Difficulty none     Equipment Currently Used at Home none     Readmission within 30 days? No     Patient currently being followed by outpatient case management? No     Do you currently have service(s) that help you manage your care at home? No     Do you take prescription medications? Yes     Do you have prescription coverage? Yes     Coverage [employer]     Do you have any problems affording any of your prescribed medications? No     Is the patient taking medications as prescribed? yes     Who is going to help you get home at discharge? family or friend     How do you get to doctors appointments? car, drives self;family or friend will provide     Are you on dialysis? No     Do you take coumadin? No     Discharge Plan A Home     Discharge Plan B Home with family;Home Health     DME Needed Upon Discharge  none     Discharge Plan discussed with: Patient     Discharge Barriers Identified None        Relationship/Environment    Name(s) of Who Lives With Patient Eric Velasquez

## 2022-01-24 NOTE — ED PROVIDER NOTES
Encounter Date: 1/23/2022       History     Chief Complaint   Patient presents with    Fall     TRIP AND FALL OVER O2 TUBING, NO LOC    Facial Pain    Head Injury     NO LOC    Elbow Injury     RT    WRIST AND HAND     RT     76-year-old female with history of atrial fibrillation, anxiety.  Patient status post mechanical trip and fall earlier today here in which she tripped over her 's oxygen tubing.  Patient states she fell forward striking her head and face and landing on her outstretched hands.  Patient is here with complaint of blunt facial trauma, headache, right wrist and right elbow pain.  She denies loss of consciousness.  He does state however that she was seen by primary care provider in told to come to emergency department to be evaluated secondary to that she is on blood thinners.  Patient denies nausea, vomiting, dizziness, no neck pain.  Denies any other constitutional symptoms.        Review of patient's allergies indicates:  No Known Allergies  Past Medical History:   Diagnosis Date    Anxiety     Constipation      No past surgical history on file.  No family history on file.  Social History     Tobacco Use    Smoking status: Never Smoker   Substance Use Topics    Alcohol use: Yes     Alcohol/week: 2.0 standard drinks     Types: 2 Glasses of wine per week    Drug use: Never     Review of Systems   Constitutional: Negative for fever.   HENT: Negative for sore throat.    Respiratory: Negative for shortness of breath.    Cardiovascular: Negative for chest pain.   Gastrointestinal: Negative for nausea and vomiting.   Genitourinary: Negative for dysuria.   Musculoskeletal: Positive for arthralgias and myalgias. Negative for back pain.   Skin: Negative for rash.   Neurological: Positive for headaches. Negative for dizziness, syncope, weakness and light-headedness.   Hematological: Does not bruise/bleed easily.       Physical Exam     Initial Vitals   BP Pulse Resp Temp SpO2   01/23/22 1759  01/23/22 1759 01/23/22 1759 01/23/22 1806 01/23/22 1759   (!) 203/84 67 20 98.7 °F (37.1 °C) 97 %      MAP       --                Physical Exam    Nursing note and vitals reviewed.  Constitutional: She appears well-developed and well-nourished.   HENT:   Head: Normocephalic.       Nose: Nose normal.   Mouth/Throat: Oropharynx is clear and moist.   Eyes: Conjunctivae and EOM are normal. Pupils are equal, round, and reactive to light.   Neck: Neck supple. No thyromegaly present. No tracheal deviation present.   Normal range of motion.  Cardiovascular: Normal rate, regular rhythm, normal heart sounds and intact distal pulses. Exam reveals no gallop and no friction rub.    No murmur heard.  Pulmonary/Chest: No stridor. No respiratory distress.   Course bilateral breath sounds no adventitious sounds   Abdominal: Abdomen is soft. Bowel sounds are normal. She exhibits no mass. There is no rebound and no guarding.   Musculoskeletal:         General: No edema. Normal range of motion.        Arms:       Cervical back: Normal range of motion and neck supple.     Lymphadenopathy:     She has no cervical adenopathy.   Neurological: She is alert and oriented to person, place, and time. She has normal strength and normal reflexes. She displays normal reflexes. No cranial nerve deficit or sensory deficit. GCS score is 15. GCS eye subscore is 4. GCS verbal subscore is 5. GCS motor subscore is 6.   Skin: Skin is warm and dry. Capillary refill takes less than 2 seconds.   Psychiatric: She has a normal mood and affect.         ED Course   Procedures  Labs Reviewed   CBC W/ AUTO DIFFERENTIAL   SARS-COV-2 RNA AMPLIFICATION, QUAL   PROTIME-INR   APTT   COMPREHENSIVE METABOLIC PANEL   TROPONIN I   TROPONIN I   CK   CK-MB   TYPE & SCREEN   PREPARE FRESH FROZEN PLASMA SOFT          Imaging Results          X-Ray Chest AP Portable (Final result)  Result time 01/23/22 19:11:12    Final result by Javier Gilbert MD (01/23/22 19:11:12)                  Narrative:    Reason: Chest Pain FALL    FINDINGS:  Portable chest at 1907 without comparisons shows normal cardiomediastinal silhouette.    Lungs are clear. Pulmonary vasculature is normal. Degenerative changes affect bilateral glenohumeral joints.    IMPRESSION:  No acute cardiopulmonary abnormality.    Electronically signed by:  Javier Gilbert MD  1/23/2022 7:11 PM CST Workstation: [id number]                             CT Maxillofacial Without Contrast (Final result)  Result time 01/23/22 18:54:43    Final result by Javier Gilbert MD (01/23/22 18:54:43)                 Narrative:    CMS MANDATED QUALITY DATA - CT RADIATION - 436    All CT scans at this facility utilize dose modulation, iterative reconstruction, and/or weight based dosing when appropriate to reduce radiation dose to as low as reasonably achievable.          Reason: FALL    TECHNIQUE: Maxillofacial CT without IV contrast obtained with Coronal and sagittal reformations.    COMPARISON: None    FINDINGS:  Negative for acute facial fracture. The mandible is intact. Cervical spine degenerative changes partially visualized.    Paranasal sinuses and visualized mastoids and middle ears are clear.    Mild soft tissue swelling affects the supraorbital right frontal scalp. Orbital soft tissues are otherwise normal. Visualized facial soft tissues are otherwise unremarkable. Intracranial compartment discussed on head CT report from same day.    Coronal and sagittal reformations confirm no depressed orbital floor fracture. Severe bilateral temporomandibular joint osteoarthrosis present with anteriorly located ossified loose bodies.    IMPRESSION:  Negative for facial fracture.    Electronically signed by:  Javier Gilbert MD  1/23/2022 6:54 PM CST Workstation: [id number]                             CT Cervical Spine Without Contrast (Final result)  Result time 01/23/22 18:51:52    Final result by Javier Gilbert MD (01/23/22 18:51:52)                 Narrative:     CMS MANDATED QUALITY DATA - CT RADIATION - 436    All CT scans at this facility utilize dose modulation, iterative reconstruction, and/or weight based dosing when appropriate to reduce radiation dose to as low as reasonably achievable.        Reason: FALL    TECHNIQUE: Cervical spine CT without IV contrast obtained with coronal and sagittal reformations.    COMPARISON:None    FINDINGS:  Negative for fracture. No epidural hematoma or prevertebral soft tissue swelling.    Carotid artery calcifications, left greater than right, are evident. Minor biapical pleural parenchymal scarring is noted.    At C2-C3, severe right facet joint osteoarthrosis.    At C3-C4, moderate right facet joint hypertrophic change and ankylosis is noted along with minor left facet joint osteoarthrosis.    At C4-C5, moderate bilateral facet joint osteoarthrosis results in minor left neural foramen narrowing.    At C5-C6, posterior osteophytic ridge and moderate left facet joint osteoarthrosis results in severe left neural foramen narrowing.    At C6-C7, posterior osteophytic ridge and moderate left and mild right facet joint osteoarthrosis results in moderate left neural foramen narrowing.    At C7-T1, no narrowing.    Coronal and sagittal reformations show normal alignment without abnormal facet widening.    IMPRESSION:  Cervical spine degenerative changes, without acute abnormality.    Electronically signed by:  Javier Gilbert MD  1/23/2022 6:51 PM [facility] Workstation: [id number]                             CT Head Without Contrast (Final result)  Result time 01/23/22 18:48:26    Final result by Javier Gilbert MD (01/23/22 18:48:26)                 Narrative:    CMS MANDATED QUALITY DATA - CT RADIATION - 436    All CT scans at this facility utilize dose modulation, iterative reconstruction, and/or weight based dosing when appropriate to reduce radiation dose to as low as reasonably achievable.          Reason: FALL    TECHNIQUE: Head CT without  IV contrast.    COMPARISON: None    FINDINGS:  Small amount of acute subarachnoid hemorrhage lies in the left frontal parietal sulci and in the left sylvian fissure. Acute left subdural hematoma overlying the left frontotemporal lobe reaches maximal thickness of 6 mm.    Mild chronic small vessel ischemic changes affect the periventricular white matter bilaterally. Old lacunar infarct suggested in left cerebellum. Calcification occurs in right gal.    No midline shift. Ventricles and cisterns are maintained. Right frontal scalp soft tissue swelling occurs and supraorbital location.    Calvarium is intact. Visualized sinuses are clear.    IMPRESSION:    1. Small volume of acute subarachnoid hemorrhage in left frontal parietal lobe sulci and sylvian fissure.  2. 6 mm acute left subdural hematoma.  3. 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        RESULT NOTIFICATION: These observations were discussed by Dr. Gilbert with, and acknowledged by, Dr. VARGHESE at 1/23/2022 6:45 PM CST    Electronically signed by:  Javier Gilbert MD  1/23/2022 6:48 PM CST Workstation: [id number]                             X-Ray Wrist Complete Right (Final result)  Result time 01/23/22 18:32:00    Final result by Javier Gilbert MD (01/23/22 18:32:00)                 Impression:      No acute right wrist abnormality.      Electronically signed by: Javier Gilbert MD  Date:    01/23/2022  Time:    18:32             Narrative:    EXAMINATION:  XR WRIST COMPLETE 3 VIEWS RIGHT    CLINICAL HISTORY:  Fall    FINDINGS:  Four views of right breast show no fracture, dislocation, or destructive osseous lesion. 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  Plate and screws about distal radius and ulna partially visualized.  Soft tissues are unremarkable.                               X-Ray Hand 3 View Right (Final result)  Result time 01/23/22 18:31:55   Procedure changed from X-Ray Hand 2 View Right      Final result by Javier Gilbert MD (01/23/22 18:31:55)                 Impression:      No acute right hand abnormality.      Electronically signed by: Javier Gilbert MD  Date:    01/23/2022  Time:    18:31             Narrative:    EXAMINATION:  XR HAND COMPLETE 3 VIEW RIGHT    CLINICAL HISTORY:  FALL; Pain, unspecified    FINDINGS:  Three views of right hand show no fracture, dislocation, or destructive osseous lesion. Severe right 1st CMC joint osteoarthrosis is present with DIP joint osteoarthrosis diffusely.  Either severe joint space narrowing or ankylosis of right 1st IP joint occurs.  Soft tissues are unremarkable.                               X-Ray Elbow Complete Right (Final result)  Result time 01/23/22 18:32:48    Final result by Javier Gilbert MD (01/23/22 18:32:48)                 Impression:      No acute right elbow abnormality.      Electronically signed by: Javier Gilbert MD  Date:    01/23/2022  Time:    18:32             Narrative:    EXAMINATION:  XR ELBOW COMPLETE 3 VIEW RIGHT    CLINICAL HISTORY:  Fall    FINDINGS:  Four views of right elbow show no fracture, dislocation, or destructive osseous lesion. Soft tissues unremarkable.  Negative for joint effusion.  Plate and screws transfixing proximal right radius and ulna partially visualized.                                 Medications   0.9%  NaCl infusion (for blood administration) (has no administration in time range)   labetaloL (NORMODYNE,TRANDATE) 5 mg/mL injection (has no administration in time range)   labetaloL injection 10 mg (10 mg Intravenous Given 1/23/22 2007)     Medical Decision Making:   Initial Assessment:   76-year-old female with history of atrial fibrillation, anxiety.  Patient status post mechanical trip and fall earlier today here in which she tripped over her 's oxygen tubing.  Patient states she fell forward striking her head and face and landing on her outstretched hands.  Patient is here with complaint of blunt facial  trauma, headache, right wrist and right elbow pain.  She denies loss of consciousness.  He does state however that she was seen by primary care provider in told to come to emergency department to be evaluated secondary to that she is on blood thinners.  Patient denies nausea, vomiting, dizziness, no neck pain.  Denies any other constitutional symptoms.        Differential Diagnosis:   Closed head injury, concussion, scalp contusion, soft tissue facial contusion, periorbital fracture, facial fracture, long bone fracture to the right wrist, right forearm, right elbow region.  Clinical Tests:   Lab Tests: Ordered and Reviewed  Radiological Study: Ordered and Reviewed  ED Management:  Patient seen evaluated emergency department.  Currently at this time patient found subarachnoid hemorrhage as well as a 6 mm left subdural hematoma with no midline shift.  (Frontal parietal).  Patient remains with GCS 15, nonfocal neurologic examination.  Case discussed with Dr. Hernandez neurosurgery.  At this time patient will be admitted to the intensive care unit.  Undergo acute 1 hour neurologic checks, will have repeat head CT in 6 hours from the 1st CT.  Patient also to be given FFP 2 units now.  Eliquis will be held.  Patient's plain films including the right elbow right wrist right hand no evidence of acute fracture.  Patient was found to have elevated blood pressure in emergency department 212/83.  Current this time will be given labetalol 10 mg IV.  The patient with persistent blood pressure will be started on Cardene infusion.            Attending Attestation:         Attending Critical Care:   Critical Care Times:   Direct Patient Care (initial evaluation, reassessments, and time considering the case)................................................................30 minutes.   Additional History from reviewing old medical records or taking additional history from the family, EMS, PCP, etc.......................10 minutes.    Ordering, Reviewing, and Interpreting Diagnostic Studies...............................................................................................................10 minutes.   Documentation..................................................................................................................................................................................10 minutes.   Consultation with other Physicians. .................................................................................................................................................10 minutes.   ==============================================================  · Total Critical Care Time - exclusive of procedural time: 70 minutes.  ==============================================================  Critical care reasons: Closed head injury, subdural hematoma, subarachnoid hemorrhage.   Critical care was time spent personally by me on the following activities: obtaining history from patient or relative, examination of patient, review of x-rays / CT sent with the patient, ordering lab, x-rays, and/or EKG, development of treatment plan with patient or relative, ordering and performing treatments and interventions, evaluation of patient's response to treatment, discussions with primary provider, discussion with consultants, interpretation of cardiac measurements and re-evaluation of patient's conition.   Critical Care Condition: potentially life-threatening                    Clinical Impression:   Final diagnoses:  [S09.8XXA] Blunt head trauma, initial encounter (Primary)  [S00.83XA] Contusion of face, initial encounter  [S50.01XA] Contusion of right elbow, initial encounter  [S63.501A] Sprain of right wrist, initial encounter  [S06.5X9A] Subdural hematoma  [S06.6X0A] Subarachnoid hemorrhage following injury, no loss of consciousness, initial encounter  [S09.90XA] Closed head injury, initial encounter          ED Disposition Condition    Admit                Samm Scott MD  01/23/22 1833       Samm Scott MD  01/23/22 2016       Samm Scott MD  01/23/22 3477

## 2022-01-24 NOTE — HOSPITAL COURSE
Assumed care of this patient on 1/24/22.  Patient with a history of paroxysmal atrial fibrillation, followed by cardiologist Dr. Mendoza, on sotalol and Eliquis.  Patient presented with fall at home, states her  has pulmonary fibrosis and is on home oxygen, she was bringing a meal to him when she tripped on his oxygen cord and fell face forward with outstretched hands.  Denies any associated loss of consciousness.  Due to being on anticoagulation she was instructed to present to the ED.  Blood pressure 203/84 on arrival, EKG sinus bradycardia, no signs of infection, CT head with small left frontal subarachnoid hemorrhage, 6 mm left acute subdural hematoma, no associated midline shift.  She received FFP and 10 mg vitamin K as per Neurosurgery recommendations, she was admitted to the ICU, Cardene drip for blood pressure control, holding of Eliquis, serial neuro checks. On 01/24 complaining of headache, mostly on right side, right upper extremity discomfort, states she feels hungry, repeat CT without any worsening. 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 She worked with therapy, recommendations for home health with rolling walker. On 1/25 feels well and at her baseline, she is requesting discharge as she states her  needs her, states he was at her bed yesterday and cried for her to return home, states cannot spend another night in the hospital, reports her niece will be staying with them and be able to provide assistance. She reports she was recently seen by Dr Mendoza, cardiologist last week, does not want to wait for consultation here in hospital but prefers to follow up as outpatient. Discussed findings, recommendations to discontinue eliquis until seen in follow up and instructed otherwise, repeat CT head as outpatient with neurosurgery follow up. Requesting discharge and appears  medically stable for discharge, cleared by consultant for discharge. Discharge plan including medications, follow up as well as strict return precautions were discussed with the patient, no objection to discharge, all questions were addressed.  Discussed with ICU RN at bedside during my encounter.    Discharge examination  Sitting in bed, alert, oriented, regular heart rhythm, on room air, ecchymosis right side of face, nonfocal

## 2022-01-24 NOTE — PT/OT/SLP EVAL
"Speech Language Pathology Evaluation  Bedside Swallow    Patient Name:  Madisyn Velasquez   MRN:  [id number]  Admitting Diagnosis: SDH (subdural hematoma)    Recommendations:                 General Recommendations:  Follow-up not indicated  Diet recommendations:  Regular, Thin   Aspiration Precautions: Standard aspiration precautions   General Precautions: Standard, fall  Communication strategies:  none    History:     Past Medical History:   Diagnosis Date    Anxiety     Constipation      HPI per EMR:"Madisyn Velasquez is a 76 y.o. female with a history as  has a past medical history of Anxiety and Constipation. who presented to the ED with a Fall (TRIP AND FALL OVER O2 TUBING, NO LOC), Facial Pain, Head Injury (NO LOC), Elbow Injury (RT), and WRIST AND HAND (RT). Patient reports falling forward hitting her head and face. She c/o right eye and facial pain, headache, right wrist and right elbow pain. Further reports speaking to PCP who advise her to come to ED for further evaluation 2/2 her being on OAC. She denies fever, chills, diaphoresis, SOB, dizziness, chest pain, palpitations, NVD, LOC or any other associated symptoms.  Labs and image reviewed. CBC unremarkable. CM unimpressive. CXR without acute cardiopulmonary abnormality.  CT maxillofacial without acute facial fracture. CT Cervical spine without acute abnormalities. 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  XR right elbow without acute right elbow abnormality. XR right wrist without acute right wrist abnormality.  XR right hand without acute right hand abnormality. Per ED doctor, noted to have elevaeted BP given IV labetalol. ED physician also reports speaking with Neurourgery with recs for repeat CT n 6 hours and FFP. Place in ICU for neuro checks and close " "monitoring."    No past surgical history on file.    Social History: Patient lives with her  who she is caring for on Hospice.    Prior Intubation HX:  None this admission    Modified Barium Swallow: None this admission    Imaging Results          X-Ray Chest AP Portable (Final result)  Result time 01/23/22 19:11:12    Final result by Javier Gilbert MD (01/23/22 19:11:12)                 Narrative:    Reason: Chest Pain FALL    FINDINGS:  Portable chest at 1907 without comparisons shows normal cardiomediastinal silhouette.    Lungs are clear. Pulmonary vasculature is normal. Degenerative changes affect bilateral glenohumeral joints.    IMPRESSION:  No acute cardiopulmonary abnormality.    Electronically signed by:  Javier Gilbert MD  1/23/2022 7:11 PM CST Workstation: [id number]                             CT Maxillofacial Without Contrast (Final result)  Result time 01/23/22 18:54:43    Final result by Javier Gilbert MD (01/23/22 18:54:43)                 Narrative:    CMS MANDATED QUALITY DATA - CT RADIATION - 436    All CT scans at this facility utilize dose modulation, iterative reconstruction, and/or weight based dosing when appropriate to reduce radiation dose to as low as reasonably achievable.          Reason: FALL    TECHNIQUE: Maxillofacial CT without IV contrast obtained with Coronal and sagittal reformations.    COMPARISON: None    FINDINGS:  Negative for acute facial fracture. The mandible is intact. Cervical spine degenerative changes partially visualized.    Paranasal sinuses and visualized mastoids and middle ears are clear.    Mild soft tissue swelling affects the supraorbital right frontal scalp. Orbital soft tissues are otherwise normal. Visualized facial soft tissues are otherwise unremarkable. Intracranial compartment discussed on head CT report from same day.    Coronal and sagittal reformations confirm no depressed orbital floor fracture. Severe bilateral temporomandibular joint " osteoarthrosis present with anteriorly located ossified loose bodies.    IMPRESSION:  Negative for facial fracture.    Electronically signed by:  Javier Gilbert MD  1/23/2022 6:54 PM CST Workstation: [id number]                             CT Cervical Spine Without Contrast (Final result)  Result time 01/23/22 18:51:52    Final result by Javier Gilbert MD (01/23/22 18:51:52)                 Narrative:    CMS MANDATED QUALITY DATA - CT RADIATION - 436    All CT scans at this facility utilize dose modulation, iterative reconstruction, and/or weight based dosing when appropriate to reduce radiation dose to as low as reasonably achievable.        Reason: FALL    TECHNIQUE: Cervical spine CT without IV contrast obtained with coronal and sagittal reformations.    COMPARISON:None    FINDINGS:  Negative for fracture. No epidural hematoma or prevertebral soft tissue swelling.    Carotid artery calcifications, left greater than right, are evident. Minor biapical pleural parenchymal scarring is noted.    At C2-C3, severe right facet joint osteoarthrosis.    At C3-C4, moderate right facet joint hypertrophic change and ankylosis is noted along with minor left facet joint osteoarthrosis.    At C4-C5, moderate bilateral facet joint osteoarthrosis results in minor left neural foramen narrowing.    At C5-C6, posterior osteophytic ridge and moderate left facet joint osteoarthrosis results in severe left neural foramen narrowing.    At C6-C7, posterior osteophytic ridge and moderate left and mild right facet joint osteoarthrosis results in moderate left neural foramen narrowing.    At C7-T1, no narrowing.    Coronal and sagittal reformations show normal alignment without abnormal facet widening.    IMPRESSION:  Cervical spine degenerative changes, without acute abnormality.    Electronically signed by:  Javier Gilbert MD  1/23/2022 6:51 PM CST Workstation: [id number]                             CT Head Without Contrast (Final result)   Result time 01/23/22 18:48:26    Final result by Javier Gilbert MD (01/23/22 18:48:26)                 Narrative:    CMS MANDATED QUALITY DATA - CT RADIATION - 436    All CT scans at this facility utilize dose modulation, iterative reconstruction, and/or weight based dosing when appropriate to reduce radiation dose to as low as reasonably achievable.          Reason: FALL    TECHNIQUE: Head CT without IV contrast.    COMPARISON: None    FINDINGS:  Small amount of acute subarachnoid hemorrhage lies in the left frontal parietal sulci and in the left sylvian fissure. Acute left subdural hematoma overlying the left frontotemporal lobe reaches maximal thickness of 6 mm.    Mild chronic small vessel ischemic changes affect the periventricular white matter bilaterally. Old lacunar infarct suggested in left cerebellum. Calcification occurs in right gal.    No midline shift. Ventricles and cisterns are maintained. Right frontal scalp soft tissue swelling occurs and supraorbital location.    Calvarium is intact. Visualized sinuses are clear.    IMPRESSION:    1. Small volume of acute subarachnoid hemorrhage in left frontal parietal lobe sulci and sylvian fissure.  2. 6 mm acute left subdural hematoma.  3. 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        RESULT NOTIFICATION: These observations were discussed by Dr. Gilbert with, and acknowledged by, Dr. VARGHESE at 1/23/2022 6:45 PM CST    Electronically signed by:  Javier Gilbert MD  1/23/2022 6:48 PM CST Workstation: [id number]                             X-Ray Wrist Complete Right (Final result)  Result time 01/23/22 18:32:00    Final result by Javier Gilbert MD (01/23/22 18:32:00)                 Impression:      No acute right wrist abnormality.      Electronically signed by: Javier Gilbert MD  Date:    01/23/2022  Time:    18:32             Narrative:    EXAMINATION:  XR WRIST COMPLETE 3 VIEWS RIGHT    CLINICAL  HISTORY:  Fall    FINDINGS:  Four views of right breast show no fracture, dislocation, or destructive osseous lesion. 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  Plate and screws about distal radius and ulna partially visualized.  Soft tissues are unremarkable.                               X-Ray Hand 3 View Right (Final result)  Result time 01/23/22 18:31:55   Procedure changed from X-Ray Hand 2 View Right     Final result by Javier Gilbert MD (01/23/22 18:31:55)                 Impression:      No acute right hand abnormality.      Electronically signed by: Javier Gilbert MD  Date:    01/23/2022  Time:    18:31             Narrative:    EXAMINATION:  XR HAND COMPLETE 3 VIEW RIGHT    CLINICAL HISTORY:  FALL; Pain, unspecified    FINDINGS:  Three views of right hand show no fracture, dislocation, or destructive osseous lesion. Severe right 1st CMC joint osteoarthrosis is present with DIP joint osteoarthrosis diffusely.  Either severe joint space narrowing or ankylosis of right 1st IP joint occurs.  Soft tissues are unremarkable.                               X-Ray Elbow Complete Right (Final result)  Result time 01/23/22 18:32:48    Final result by Javier Gilbert MD (01/23/22 18:32:48)                 Impression:      No acute right elbow abnormality.      Electronically signed by: Javier Gilbert MD  Date:    01/23/2022  Time:    18:32             Narrative:    EXAMINATION:  XR ELBOW COMPLETE 3 VIEW RIGHT    CLINICAL HISTORY:  Fall    FINDINGS:  Four views of right elbow show no fracture, dislocation, or destructive osseous lesion. Soft tissues unremarkable.  Negative for joint effusion.  Plate and screws transfixing proximal right radius and ulna partially visualized.                              Prior diet: Regular solids, thin liquids    Occupation/hobbies/homemaking: retired nurse.    Subjective     Awake, alert, cooperative, no complaints. Repositioned pt in bed for full upright positioning. Seen eating lunch  meal.    Patient goals: ready to go home     Pain/Comfort:  · Pain Rating 1: 4/10  · Location - Side 1: Right  · Location - Orientation 1: upper  · Location 1: shoulder  · Pain Addressed 1: Distraction    Respiratory Status: Room air    Objective:     Oral Musculature Evaluation  · Oral Musculature: WFL  · Dentition: present and adequate  · Secretion Management: adequate  · Mucosal Quality: dry  · Mandibular Strength and Mobility: WFL  · Oral Labial Strength and Mobility: WFL  · Lingual Strength and Mobility: WFL  · Velar Elevation: WFL  · Buccal Strength and Mobility: WFL  · Volitional Cough: WFL  · Volitional Swallow: hyolaryngeal elevation observed  · Voice Prior to PO Intake: mild harshness    Bedside Swallow Eval:   Consistencies Assessed:  · Thin liquid: multiple consecutive uninterrupted sips, self regulated via cup edge and straw  · Solid: multiple small bites of baked chicken, cooked vegetables    Oral Phase:   · Intact labial seal to cup edge and for spoon stripping   · Adequate oral containment without anterior spillage across consistencies   · Timely mastication with visible rotary chew   · No evidence of appreciable oral residue    Pharyngeal Phase:   · No overt signs or symptoms of aspiration or airway threat  · Clear vocal quality maintained   · Risk of aspiration not indicated although cannot be ruled out at bedside  · Multiple swallows not observed across consistencies trialled  · Globus sensation not promoted     Compensatory Strategies  · Clinician assisted with tray set up due to UE impairment    Cognitive/Communication:  For the purposes of this evaluation pt able to provide history with fluent, appropriate connected speech that is 100% intelligible. No overt receptive/expressive language deficits or motor speech impairments apparent at this time. Pt is oriented x4.       Assessment:     Madisyn Velasquez is a 76 y.o. female with intact speech/language/swallowing skills at this time. No further  treatment is warranted.    Goals:   Multidisciplinary Problems     SLP Goals     Not on file                Plan:     · Patient to be seen:      · Plan of Care expires:     · Plan of Care reviewed with:  patient   · SLP Follow-Up:  No       Discharge recommendations:  home with home health   Barriers to Discharge:  None    Time Tracking:     SLP Treatment Date:   01/24/22  Speech Start Time:  1158  Speech Stop Time:  1210     Speech Total Time (min):  12 min    Billable Minutes: Eval Swallow and Oral Function 12    01/24/2022

## 2022-01-25 VITALS
DIASTOLIC BLOOD PRESSURE: 70 MMHG | BODY MASS INDEX: 25.69 KG/M2 | TEMPERATURE: 98 F | SYSTOLIC BLOOD PRESSURE: 160 MMHG | HEIGHT: 63 IN | HEART RATE: 56 BPM | RESPIRATION RATE: 23 BRPM | OXYGEN SATURATION: 96 % | WEIGHT: 145 LBS

## 2022-01-25 LAB
ALBUMIN SERPL BCP-MCNC: 3.7 G/DL (ref 3.5–5.2)
ALP SERPL-CCNC: 43 U/L (ref 55–135)
ALT SERPL W/O P-5'-P-CCNC: 12 U/L (ref 10–44)
ANION GAP SERPL CALC-SCNC: 7 MMOL/L (ref 8–16)
AST SERPL-CCNC: 13 U/L (ref 10–40)
BASOPHILS # BLD AUTO: 0.02 K/UL (ref 0–0.2)
BASOPHILS NFR BLD: 0.5 % (ref 0–1.9)
BILIRUB SERPL-MCNC: 0.8 MG/DL (ref 0.1–1)
BUN SERPL-MCNC: 9 MG/DL (ref 8–23)
CALCIUM SERPL-MCNC: 8.7 MG/DL (ref 8.7–10.5)
CHLORIDE SERPL-SCNC: 100 MMOL/L (ref 95–110)
CO2 SERPL-SCNC: 27 MMOL/L (ref 23–29)
CREAT SERPL-MCNC: 0.6 MG/DL (ref 0.5–1.4)
DIFFERENTIAL METHOD: ABNORMAL
EOSINOPHIL # BLD AUTO: 0.1 K/UL (ref 0–0.5)
EOSINOPHIL NFR BLD: 2.8 % (ref 0–8)
ERYTHROCYTE [DISTWIDTH] IN BLOOD BY AUTOMATED COUNT: 13.7 % (ref 11.5–14.5)
EST. GFR  (AFRICAN AMERICAN): >60 ML/MIN/1.73 M^2
EST. GFR  (NON AFRICAN AMERICAN): >60 ML/MIN/1.73 M^2
GLUCOSE SERPL-MCNC: 111 MG/DL (ref 70–110)
HCT VFR BLD AUTO: 38.1 % (ref 37–48.5)
HGB BLD-MCNC: 12.6 G/DL (ref 12–16)
IMM GRANULOCYTES # BLD AUTO: 0.01 K/UL (ref 0–0.04)
IMM GRANULOCYTES NFR BLD AUTO: 0.3 % (ref 0–0.5)
INR PPP: 1.1
LYMPHOCYTES # BLD AUTO: 1.4 K/UL (ref 1–4.8)
LYMPHOCYTES NFR BLD: 36.9 % (ref 18–48)
MAGNESIUM SERPL-MCNC: 1.9 MG/DL (ref 1.6–2.6)
MCH RBC QN AUTO: 30.6 PG (ref 27–31)
MCHC RBC AUTO-ENTMCNC: 33.1 G/DL (ref 32–36)
MCV RBC AUTO: 93 FL (ref 82–98)
MONOCYTES # BLD AUTO: 0.5 K/UL (ref 0.3–1)
MONOCYTES NFR BLD: 11.6 % (ref 4–15)
NEUTROPHILS # BLD AUTO: 1.9 K/UL (ref 1.8–7.7)
NEUTROPHILS NFR BLD: 47.9 % (ref 38–73)
NRBC BLD-RTO: 0 /100 WBC
PHOSPHATE SERPL-MCNC: 3.2 MG/DL (ref 2.7–4.5)
PLATELET # BLD AUTO: 156 K/UL (ref 150–450)
PMV BLD AUTO: 9.9 FL (ref 9.2–12.9)
POTASSIUM SERPL-SCNC: 4.6 MMOL/L (ref 3.5–5.1)
PROT SERPL-MCNC: 6.4 G/DL (ref 6–8.4)
PROTHROMBIN TIME: 13.4 SEC (ref 11.4–13.7)
RBC # BLD AUTO: 4.12 M/UL (ref 4–5.4)
SODIUM SERPL-SCNC: 134 MMOL/L (ref 136–145)
WBC # BLD AUTO: 3.88 K/UL (ref 3.9–12.7)

## 2022-01-25 PROCEDURE — 63600175 PHARM REV CODE 636 W HCPCS: Performed by: NURSE PRACTITIONER

## 2022-01-25 PROCEDURE — 80053 COMPREHEN METABOLIC PANEL: CPT | Performed by: NURSE PRACTITIONER

## 2022-01-25 PROCEDURE — 36415 COLL VENOUS BLD VENIPUNCTURE: CPT | Performed by: INTERNAL MEDICINE

## 2022-01-25 PROCEDURE — 85610 PROTHROMBIN TIME: CPT | Performed by: INTERNAL MEDICINE

## 2022-01-25 PROCEDURE — 94761 N-INVAS EAR/PLS OXIMETRY MLT: CPT

## 2022-01-25 PROCEDURE — 25000003 PHARM REV CODE 250: Performed by: INTERNAL MEDICINE

## 2022-01-25 PROCEDURE — 84100 ASSAY OF PHOSPHORUS: CPT | Performed by: NURSE PRACTITIONER

## 2022-01-25 PROCEDURE — 25000003 PHARM REV CODE 250: Performed by: NURSE PRACTITIONER

## 2022-01-25 PROCEDURE — 83735 ASSAY OF MAGNESIUM: CPT | Performed by: NURSE PRACTITIONER

## 2022-01-25 PROCEDURE — 99900035 HC TECH TIME PER 15 MIN (STAT)

## 2022-01-25 PROCEDURE — 85025 COMPLETE CBC W/AUTO DIFF WBC: CPT | Performed by: INTERNAL MEDICINE

## 2022-01-25 PROCEDURE — 97116 GAIT TRAINING THERAPY: CPT

## 2022-01-25 PROCEDURE — 25000003 PHARM REV CODE 250

## 2022-01-25 PROCEDURE — 25000003 PHARM REV CODE 250: Performed by: NEUROLOGICAL SURGERY

## 2022-01-25 PROCEDURE — C9113 INJ PANTOPRAZOLE SODIUM, VIA: HCPCS | Performed by: NURSE PRACTITIONER

## 2022-01-25 RX ORDER — AMLODIPINE BESYLATE 2.5 MG/1
2.5 TABLET ORAL DAILY
Qty: 30 TABLET | Refills: 0 | Status: SHIPPED | OUTPATIENT
Start: 2022-01-25 | End: 2022-05-17 | Stop reason: HOSPADM

## 2022-01-25 RX ORDER — LEVETIRACETAM 500 MG/1
500 TABLET ORAL 2 TIMES DAILY
Qty: 60 TABLET | Refills: 0 | Status: SHIPPED | OUTPATIENT
Start: 2022-01-25 | End: 2022-04-28

## 2022-01-25 RX ORDER — AMLODIPINE BESYLATE 2.5 MG/1
2.5 TABLET ORAL DAILY
Status: DISCONTINUED | OUTPATIENT
Start: 2022-01-25 | End: 2022-01-25 | Stop reason: HOSPADM

## 2022-01-25 RX ADMIN — PANTOPRAZOLE SODIUM 40 MG: 40 INJECTION, POWDER, FOR SOLUTION INTRAVENOUS at 08:01

## 2022-01-25 RX ADMIN — BUPROPION HYDROCHLORIDE 100 MG: 100 TABLET, FILM COATED ORAL at 10:01

## 2022-01-25 RX ADMIN — SOTALOL HYDROCHLORIDE 40 MG: 80 TABLET ORAL at 08:01

## 2022-01-25 RX ADMIN — LABETALOL HYDROCHLORIDE 10 MG: 5 INJECTION, SOLUTION INTRAVENOUS at 02:01

## 2022-01-25 RX ADMIN — AMLODIPINE BESYLATE 2.5 MG: 2.5 TABLET ORAL at 02:01

## 2022-01-25 RX ADMIN — MUPIROCIN: 20 OINTMENT TOPICAL at 08:01

## 2022-01-25 RX ADMIN — CHLORHEXIDINE GLUCONATE 15 ML: 1.2 RINSE ORAL at 08:01

## 2022-01-25 RX ADMIN — ESCITALOPRAM OXALATE 20 MG: 10 TABLET ORAL at 08:01

## 2022-01-25 RX ADMIN — LEVETIRACETAM 500 MG: 500 TABLET, FILM COATED ORAL at 08:01

## 2022-01-25 NOTE — PLAN OF CARE
Order for rolling walker noted, patient does not qualify, received rw in 8/2018.  Patient to discharge with home health, referral sent to [facility].  CM will verify start of care.      Patient will be seen 1/26/22 JODY Esparza [phone]     01/25/22 1502   Final Note   Assessment Type Final Discharge Note   Anticipated Discharge Disposition Home-Health   What phone number can be called within the next 1-3 days to see how you are doing after discharge? [phone]   Post-Acute Status   Post-Acute Authorization Home Health   Home Health Status Referrals Sent   Discharge Delays None known at this time

## 2022-01-25 NOTE — PROGRESS NOTES
CC:  Traumatic Subdural hematoma    Patient seen examined at 1:15 p.m.    No events overnight.  Patient denies headache, neck pain, visual disturbance, speech difficulty, extremity weakness, paresthesias.    Head CT reviewed:  small left frontal temporal subdural hematoma without mass effect or midline shift    CT cervical spine reviewed:  age-appropriate degenerative changes without acute osseous abnormality    CT maxillofacial reviewed:  no acute fractures.    Vitals:    01/24/22 1500 01/24/22 1600 01/24/22 1700 01/24/22 1800   BP: 129/60 (!) 129/59  (!) 149/65   Pulse: 62 60 73 71   Resp: 13 (!) 31 (!) 27 (!) 25   Temp:  98 °F (36.7 °C)     TempSrc:       SpO2: 97% 97% 99% 97%   Weight:       Height:         Sodium 133    On exam, she is resting comfortably, no distress awake alert oriented x3.  Pupils equal, reactive.  Bilateral periorbital ecchymosis.  Face symmetric.  Pronator drift absent.  Moves all extremities no focal deficit.  Sensation grossly intact    Impression:    Acute traumatic small left frontal temporal subdural hematoma, neurologically intact, nonsurgical    Recommendations:    Repeat head CT in a.m.  Maintain normal sodium  Continue current care  Anticipate discharge home tomorrow from neurosurgical standpoint if remains clinically and radiographically stable.

## 2022-01-25 NOTE — PT/OT/SLP PROGRESS
Occupational Therapy      Patient Name:  Madisyn Velasquez   MRN:  [id number]    Patient not seen today secondary to Unavailable (Comment) (with doctor, possible DC home). Will follow-up next service date if available.    1/25/2022

## 2022-01-25 NOTE — PLAN OF CARE
Problem: Physical Therapy Goal  Goal: Physical Therapy Goal  Description: Goals to be met by: Discharge     Patient will increase functional independence with mobility by performin. Supine to sit with Supervision  2. Sit to stand transfer with Supervision  3. Bed to chair transfer with Supervision using least restrictive assistive device  4. Gait  x 150 feet with Supervision using least restrictive assistive device        Outcome: Ongoing, Progressing

## 2022-01-25 NOTE — PLAN OF CARE
Repeat head CT this morning is stable.  Patient remains neurologically stable.  Blood pressure 140s over 60s, heart rate 60s. Normal sinus rhythm.    Discharge when medically stable.  Follow-up as outpatient with repeat head CT without in 4 weeks.  Continue to hold anticoagulants/antiplatelets    Please call with any questions.

## 2022-01-25 NOTE — PT/OT/SLP EVAL
Physical Therapy Evaluation    Patient Name:  Madisyn Velasquez   MRN:  [id number]    Recommendations:     Discharge Recommendations:  home health PT   Discharge Equipment Recommendations: none   Barriers to discharge: increase assist with mobility    Assessment:     Madisyn Velasquez is a 76 y.o. female admitted with a medical diagnosis of SDH (subdural hematoma).  She presents with the following impairments/functional limitations:  weakness,impaired endurance,impaired self care skills,impaired functional mobilty,gait instability,impaired balance,decreased lower extremity function,decreased safety awareness,pain,impaired cardiopulmonary response to activity.    Pt found in bed with HOB elevated. Pt agreeable to visit. Pt reports R UE pain and is unable to use it to assist with bed mobility. Pt required min A for bed mobility due to decrease R UE use. Pt able to perform sit to stand with CGA. Pt ambulated with left HHA x 100 ft with CGA occ min A with turn/pivot and increase fatigue.     Rehab Prognosis: Fair; patient would benefit from acute skilled PT services to address these deficits and reach maximum level of function.    Recent Surgery: * No surgery found *      Plan:     During this hospitalization, patient to be seen 6 x/week to address the identified rehab impairments via gait training,therapeutic activities,therapeutic exercises,neuromuscular re-education and progress toward the following goals:    · Plan of Care Expires:  02/24/22    Subjective     Chief Complaint: R UE pain  Patient/Family Comments/goals: get some therapy at discharge  Pain/Comfort:  ·  did not quantify R UE, unable to tolerate WB    Patients cultural, spiritual, Voodoo conflicts given the current situation: no    Living Environment:  Pt lives with her in a one story home with a chair lift to enter. (+) , she is the primary caregiver for her   Prior to admission, patients level of function was Independent no AD occ SPC use in R  UE.  Equipment used at home: none.  DME owned (not currently used): none.  Upon discharge, patient will have assistance from family.    Objective:     Communicated with RN prior to session.  Patient found HOB elevated with peripheral IV,telemetry,pulse ox (continuous),blood pressure cuff  upon PT entry to room.    General Precautions: Standard, fall   Orthopedic Precautions:N/A   Braces:    Respiratory Status: Room air    Exams:  · Cognitive Exam:  Patient is oriented to Person, Place, Time and Situation  · RLE ROM: WFL  · RLE Strength: 4/5  · LLE ROM: WFL  · LLE Strength: 4/5    Functional Mobility:  · Bed Mobility:     · Supine to Sit: minimum assistance  · Transfers:     · Sit to Stand:  contact guard assistance with hand-held assist  · Gait: 100 ft wtih left HHA and CGA/min A    Therapeutic Activities and Exercises:   Pt educated on POC, discharge recommendation, potential DME needs, importance of time OOB, proper form with bed mobility and transfers, need for assist with mobility, use of call bell to seek assistance as needed and fall prevention      AM-PAC 6 CLICK MOBILITY  Total Score:      Patient left up in chair with all lines intact, call button in reach and RN notified.    GOALS:   Multidisciplinary Problems     Physical Therapy Goals        Problem: Physical Therapy Goal    Goal Priority Disciplines Outcome Goal Variances Interventions   Physical Therapy Goal     PT, PT/OT Ongoing, Progressing     Description: Goals to be met by: Discharge     Patient will increase functional independence with mobility by performin. Supine to sit with Supervision  2. Sit to stand transfer with Supervision  3. Bed to chair transfer with Supervision using least restrictive assistive device  4. Gait  x 150 feet with Supervision using least restrictive assistive device                         History:     Past Medical History:   Diagnosis Date    Anxiety     Constipation        No past surgical history on  file.    Time Tracking:     PT Received On: 01/24/22  PT Start Time: 0909     PT Stop Time: 0931  PT Total Time (min): 22 min     Billable Minutes: Evaluation 10 and Gait Training 12      01/25/2022

## 2022-01-25 NOTE — PLAN OF CARE
Important Message from Medicare was verbally explained and given to patient/caregiver on 01/25/2022 at 2:18pm    Spoke with patient at bedside  And reviewed IMM and left a copy in the patient's room     addressed any questions or concerns.    Important Message from Medicare document will be scanned into patient's medical record

## 2022-01-25 NOTE — PT/OT/SLP PROGRESS
Physical Therapy Treatment    Patient Name:  Madisyn Velasquez   MRN:  [id number]    Recommendations:     Discharge Recommendations:  home health PT   Discharge Equipment Recommendations: bedside commode,walker, rolling   Barriers to discharge: medical status    Assessment:     Madisyn Velasquez is a 76 y.o. female admitted with a medical diagnosis of SDH (subdural hematoma).  She presents with the following impairments/functional limitations:  weakness,impaired endurance,impaired self care skills,impaired functional mobilty,gait instability,impaired balance,decreased lower extremity function,decreased safety awareness,pain,impaired cardiopulmonary response to activity.    Pt found in bathroom with RN present. Pt reports feeling much better and agreeable to visit. Pt willing to trial RW this date for increase safety with mobility. Pt ambulated 200 ft with RW and CGA. Pt would benefit from HHPT at discharge secondary to inability to drive at this time due to intermittent R UE pain and decrease R UE mobility.      Rehab Prognosis: Fair; patient would benefit from acute skilled PT services to address these deficits and reach maximum level of function.    Recent Surgery: * No surgery found *      Plan:     During this hospitalization, patient to be seen 6 x/week to address the identified rehab impairments via gait training,therapeutic activities,therapeutic exercises,neuromuscular re-education and progress toward the following goals:    · Plan of Care Expires:  02/26/22    Subjective     Chief Complaint: decrease R UE function  Patient/Family Comments/goals: return home with HHPT, RW and commode  Pain/Comfort:  · Pain Rating 1: other (see comments) (did not quantify)  · Location - Side 1: Right  · Location 1: wrist  · Pain Addressed 1: Reposition,Distraction,Cessation of Activity      Objective:     Communicated with RN prior to session.  Patient found in bathroom with peripheral IV,telemetry,pulse ox (continuous),blood pressure  cuff upon PT entry to room.     General Precautions: Standard, fall   Orthopedic Precautions:N/A   Braces: N/A  Respiratory Status: Room air     Functional Mobility:  · Transfers:     · Sit to Stand:  contact guard assistance with no AD  · Gait: 200 ft wtih RW and CGA      AM-PAC 6 CLICK MOBILITY          Therapeutic Activities and Exercises:   Pt educated on POC, discharge recommendation, importance of time OOB, benefit of RW at this time, RW management, need for assist with mobility, use of call bell to seek assistance as needed and fall prevention      Patient left up in chair with all lines intact, call button in reach and RN notified..    GOALS:   Multidisciplinary Problems     Physical Therapy Goals        Problem: Physical Therapy Goal    Goal Priority Disciplines Outcome Goal Variances Interventions   Physical Therapy Goal     PT, PT/OT Ongoing, Progressing     Description: Goals to be met by: Discharge     Patient will increase functional independence with mobility by performin. Supine to sit with Supervision  2. Sit to stand transfer with Supervision  3. Bed to chair transfer with Supervision using least restrictive assistive device  4. Gait  x 150 feet with Supervision using least restrictive assistive device                         Time Tracking:     PT Received On: 22  PT Start Time: 929     PT Stop Time: 940  PT Total Time (min): 11 min     Billable Minutes: Gait Training 11    Treatment Type: Treatment  PT/PTA: PT     PTA Visit Number: 0     2022

## 2022-01-25 NOTE — CARE UPDATE
01/25/22 0900   PRE-TX-O2   O2 Device (Oxygen Therapy) room air   Pulse Oximetry Type Continuous   $ Pulse Oximetry - Multiple Charge Pulse Oximetry - Multiple   Respiratory Evaluation   $ Care Plan Tech Time 15 min

## 2022-01-25 NOTE — SUBJECTIVE & OBJECTIVE
Interval History:     Review of Systems  Objective:     Vital Signs (Most Recent):  Temp: 98.2 °F (36.8 °C) (01/25/22 1200)  Pulse: (!) 58 (01/25/22 1300)  Resp: 18 (01/25/22 1300)  BP: (!) 151/67 (01/25/22 1200)  SpO2: 98 % (01/25/22 1300) Vital Signs (24h Range):  Temp:  [97.8 °F (36.6 °C)-98.8 °F (37.1 °C)] 98.2 °F (36.8 °C)  Pulse:  [52-73] 58  Resp:  [12-31] 18  SpO2:  [96 %-99 %] 98 %  BP: (129-151)/(59-72) 151/67     Weight: 65.8 kg (145 lb)  Body mass index is 25.69 kg/m².    Intake/Output Summary (Last 24 hours) at 1/25/2022 1418  Last data filed at 1/25/2022 1300  Gross per 24 hour   Intake 1060 ml   Output 403 ml   Net 657 ml      Physical Exam    Significant Labs:   BMP:   Recent Labs   Lab 01/25/22  0656   *   *   K 4.6      CO2 27   BUN 9   CREATININE 0.6   CALCIUM 8.7   MG 1.9     CBC:   Recent Labs   Lab 01/23/22 1928 01/24/22  0450 01/25/22  0656   WBC 6.49 6.44  6.44 3.88*   HGB 14.3 12.9  12.9 12.6   HCT 44.2 38.5  38.5 38.1    141*  141* 156     CMP:   Recent Labs   Lab 01/23/22 1928 01/24/22  0450 01/25/22  0656   * 133* 134*   K 3.9 3.5 4.6   CL 99 102 100   CO2 24 22* 27   * 111* 111*   BUN 12 10 9   CREATININE 0.5 0.5 0.6   CALCIUM 9.2 8.8 8.7   PROT 6.8  --  6.4   ALBUMIN 4.3  --  3.7   BILITOT 0.6  --  0.8   ALKPHOS 41*  --  43*   AST 16  --  13   ALT 15  --  12   ANIONGAP 10 9 7*   EGFRNONAA >60.0 >60.0 >60.0     Lactic Acid: No results for input(s): LACTATE in the last 48 hours.  Magnesium:   Recent Labs   Lab 01/24/22  0450 01/25/22  0656   MG 1.8 1.9     POCT Glucose: No results for input(s): POCTGLUCOSE in the last 48 hours.  Troponin:   Recent Labs   Lab 01/23/22  1928   TROPONINI <0.030     TSH:   Recent Labs   Lab 01/23/22 2059   TSH 4.370     Urine Culture: No results for input(s): LABURIN in the last 48 hours.  Urine Studies: No results for input(s): COLORU, APPEARANCEUA, PHUR, SPECGRAV, PROTEINUA, GLUCUA, KETONESU, BILIRUBINUA,  OCCULTUA, NITRITE, UROBILINOGEN, LEUKOCYTESUR, RBCUA, WBCUA, BACTERIA, SQUAMEPITHEL, HYALINECASTS in the last 48 hours.    Invalid input(s): GISELLA    Significant Imaging: I have reviewed and interpreted all pertinent imaging results/findings within the past 24 hours.   CT head  FINDINGS: Acute/subacute left frontal subdural hematoma is similar in size to the reference examination, measuring 5 mm in maximum thickness. It demonstrates areas of internal hyperattenuation and isoattenuation. Deep to this, small focus of subarachnoid hemorrhage, primarily affecting the posterior superior left frontal lobe, also similar to prior. No new intracranial hemorrhage. No significant midline shift. Ventricles and sulci are stable in size compared to prior. Mild periventricular white matter hypoattenuation consistent with microangiopathic change. Cerebellar hemispheres and brainstem are unremarkable.     No calvarial lesion or fracture. Mastoid air cells are clear. Paranasal sinuses are clear.     IMPRESSION:     No significant change of left frontal subdural hematoma and left frontal subarachnoid hemorrhage compared to January 24.

## 2022-01-27 NOTE — DISCHARGE SUMMARY
[facility] Medicine  Discharge Summary      Patient Name: Madisyn Velasquez  MRN: [id number]  Patient Class: IP- Inpatient  Admission Date: 1/23/2022  Hospital Length of Stay: 2 days  Discharge Date and Time:  01/27/2022 8:27 AM  Attending Physician: No att. providers found   Discharging Provider: Rachel Ta MD  Primary Care Provider: Lamar Spence MD      HPI:   Madisyn Velasquez is a 76 y.o. female with a history as  has a past medical history of Anxiety and Constipation. who presented to the ED with a Fall (TRIP AND FALL OVER O2 TUBING, NO LOC), Facial Pain, Head Injury (NO LOC), Elbow Injury (RT), and WRIST AND HAND (RT). Patient reports falling forward hitting her head and face. She c/o right eye and facial pain, headache, right wrist and right elbow pain. Further reports speaking to PCP who advise her to come to ED for further evaluation 2/2 her being on OAC. She denies fever, chills, diaphoresis, SOB, dizziness, chest pain, palpitations, NVD, LOC or any other associated symptoms.  Labs and image reviewed. CBC unremarkable. CM unimpressive. CXR without acute cardiopulmonary abnormality.  CT maxillofacial without acute facial fracture. CT Cervical spine without acute abnormalities. 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  XR right elbow without acute right elbow abnormality. XR right wrist without acute right wrist abnormality.  XR right hand without acute right hand abnormality. Per ED doctor, noted to have elevaeted BP given IV labetalol. ED physician also reports speaking with Neurourgery with recs for repeat CT n 6 hours and FFP. Place in ICU for neuro checks and close monitoring.     Of note, prior to transferring to ICU, cardene gtt started for BP  control.            * No surgery found *      Hospital Course:   Assumed care of this patient on 1/24/22.  Patient with a history of paroxysmal atrial fibrillation, followed by cardiologist Dr. Mendoza, on sotalol and Eliquis.  Patient presented with fall at home, states her  has pulmonary fibrosis and is on home oxygen, she was bringing a meal to him when she tripped on his oxygen cord and fell face forward with outstretched hands.  Denies any associated loss of consciousness.  Due to being on anticoagulation she was instructed to present to the ED.  Blood pressure 203/84 on arrival, EKG sinus bradycardia, no signs of infection, CT head with small left frontal subarachnoid hemorrhage, 6 mm left acute subdural hematoma, no associated midline shift.  She received FFP and 10 mg vitamin K as per Neurosurgery recommendations, she was admitted to the ICU, Cardene drip for blood pressure control, holding of Eliquis, serial neuro checks. On 01/24 complaining of headache, mostly on right side, right upper extremity discomfort, states she feels hungry, repeat CT without any worsening. 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 She worked with therapy, recommendations for home health with rolling walker. On 1/25 feels well and at her baseline, she is requesting discharge as she states her  needs her, states he was at her bed yesterday and cried for her to return home, states cannot spend another night in the hospital, reports her niece will be staying with them and be able to provide assistance. She reports she was recently seen by Dr Mendoza, cardiologist last week, does not want to wait for consultation here in hospital but prefers to follow up as outpatient. Discussed findings, recommendations to discontinue eliquis until seen in follow up and instructed otherwise, repeat CT head as outpatient  with neurosurgery follow up. Requesting discharge and appears medically stable for discharge, cleared by consultant for discharge. Discharge plan including medications, follow up as well as strict return precautions were discussed with the patient, no objection to discharge, all questions were addressed.  Discussed with ICU RN at bedside during my encounter.    Discharge examination  Sitting in bed, alert, oriented, regular heart rhythm, on room air, ecchymosis right side of face, nonfocal       Goals of Care Treatment Preferences:  Code Status: Full Code      Consults:   Consults (From admission, onward)        Status Ordering Provider     Inpatient consult to Registered Dietitian/Nutritionist  Once        Provider:  (Not yet assigned)    Completed OSMAR GRACIA     IP consult to case management/social work  Once        Provider:  (Not yet assigned)    Completed OSMAR GRACIA     Inpatient consult to Neurosurgery  Once        Provider:  Raymond Hernandez MD    Completed OSMAR GRACIA          No new Assessment & Plan notes have been filed under this hospital service since the last note was generated.  Service: Hospital Medicine    Final Active Diagnoses:    Diagnosis Date Noted POA    PRINCIPAL PROBLEM:  Acute traumatic left frontotemporal subdural hematoma, 6 mm [S06.5X9A] 01/24/2022 Yes    Acute traumatic left frontal SAH [I60.9] 01/23/2022 Yes    Hyponatremia [E87.1] 01/24/2022 No    Anticoagulated by anticoagulation treatment [Z79.01] 01/24/2022 Not Applicable     Chronic    Fall at home, initial encounter [W19.XXXA, Y92.009] 01/24/2022 Not Applicable    pAF [I48.91] 01/23/2022 Yes     Chronic    Gastroesophageal reflux disease without esophagitis [K21.9] 01/23/2022 Yes    Hyperlipidemia [E78.5] 01/23/2022 Yes      Problems Resolved During this Admission:       Discharged Condition: good    Disposition: Home-Health Care [facility]    Follow Up:   Contact information for follow-up providers      "Lamar Spence MD. Schedule an appointment as soon as possible for a visit in 2 days.    Specialty: Internal Medicine  Why: For recheck/continuing care  Contact information:  [address]  [address]  [facility] [address]  [phone]             Ryley Mendoza MD. Schedule an appointment as soon as possible for a visit in 2 weeks.    Specialties: Cardiology, Interventional Cardiology  Why: follow up  Contact information:  [address]  [address]  [facility] [address]  [phone]             Obinna Gale DO. Schedule an appointment as soon as possible for a visit in 4 weeks.    Specialty: Neurosurgery  Why: follow up  Contact information:  [address] [place] [address]   [address]  [facility] [address]  [phone]                   Contact information for after-discharge care     Home Medical Care     [facility] .    Service: Home Health Services  Contact information:  [address], [address]  [facility] [address] [phone]                           Patient Instructions:      WALKER FOR HOME USE     Order Specific Question Answer Comments   Type of Walker: Adult (5'4"-6'6")    With wheels? Yes    Height: 5' 3" (1.6 m)    Weight: 65.8 kg (145 lb)    Length of need (1-99 months): 99    Does patient have medical equipment at home? none    Please check all that apply: Patient's condition impairs ambulation.      CT Head Without Contrast   Standing Status: Future Standing Exp. Date: 02/25/22   Order Comments: Schedule in 4 weeks prior to appt with neurosurgeon Dr Gale. Results to be sent to Dr Gale.     Order Specific Question Answer Comments   May the Radiologist modify the order per protocol to meet the clinical needs of the patient? Yes      Ambulatory referral/consult to Home Health   Standing Status: Future   Referral Priority: Routine Referral Type: Home Health   Referral Reason: Specialty Services Required   Requested Specialty: Home " Health Services   Number of Visits Requested: 1     Diet Cardiac     Notify your health care provider if you experience any of the following:  temperature >100.4     Notify your health care provider if you experience any of the following:  difficulty breathing or increased cough     Notify your health care provider if you experience any of the following:  severe uncontrolled pain     Notify your health care provider if you experience any of the following:  persistent dizziness, light-headedness, or visual disturbances     Notify your health care provider if you experience any of the following:  severe persistent headache     Notify your health care provider if you experience any of the following:  increased confusion or weakness     Activity as tolerated       Significant Diagnostic Studies: Labs:   BMP: No results for input(s): GLU, NA, K, CL, CO2, BUN, CREATININE, CALCIUM, MG in the last 48 hours., CMP No results for input(s): NA, K, CL, CO2, GLU, BUN, CREATININE, CALCIUM, PROT, ALBUMIN, BILITOT, ALKPHOS, AST, ALT, ANIONGAP, ESTGFRAFRICA, EGFRNONAA in the last 48 hours., CBC No results for input(s): WBC, HGB, HCT, PLT in the last 48 hours., INR   Lab Results   Component Value Date    INR 1.1 01/25/2022    INR 1.1 01/23/2022   , Lipid Panel   Lab Results   Component Value Date    CHOL 145 01/24/2022    HDL 68 01/24/2022    LDLCALC 66.4 01/24/2022    TRIG 53 01/24/2022    CHOLHDL 46.9 01/24/2022   , Troponin   Recent Labs   Lab 01/23/22  1928   TROPONINI <0.030    and A1C:   Recent Labs   Lab 01/24/22  0450   HGBA1C 5.2       Pending Diagnostic Studies:     Procedure Component Value Units Date/Time    APTT [[id number]] Collected: 01/24/22 0400    Order Status: Sent Lab Status: In process Updated: 01/24/22 0405    Specimen: Blood     CBC auto differential [[id number]] Collected: 01/24/22 0400    Order Status: Sent Lab Status: In process Updated: 01/24/22 0405    Specimen: Blood     CBC auto differential [[id number]]  Collected: 01/24/22 0400    Order Status: Sent Lab Status: In process Updated: 01/24/22 0405    Specimen: Blood     Comprehensive metabolic panel [[id number]] Collected: 01/24/22 0400    Order Status: Sent Lab Status: In process Updated: 01/24/22 0405    Specimen: Blood     Lipid panel [[id number]] Collected: 01/24/22 0400    Order Status: Sent Lab Status: In process Updated: 01/24/22 0405    Specimen: Blood     Magnesium [[id number]] Collected: 01/24/22 0400    Order Status: Sent Lab Status: In process Updated: 01/24/22 0405    Specimen: Blood     Phosphorus [[id number]] Collected: 01/24/22 0400    Order Status: Sent Lab Status: In process Updated: 01/24/22 0405    Specimen: Blood     Protime-INR [[id number]] Collected: 01/24/22 0400    Order Status: Sent Lab Status: In process Updated: 01/24/22 0405    Specimen: Blood     Troponin I #2 [[id number]] Collected: 01/23/22 1928    Order Status: Sent Lab Status: In process Updated: 01/23/22 1928    Specimen: Blood        X-Ray Elbow Complete Right    Result Date: 1/23/2022  EXAMINATION: XR ELBOW COMPLETE 3 VIEW RIGHT CLINICAL HISTORY: Fall FINDINGS: Four views of right elbow show no fracture, dislocation, or destructive osseous lesion. Soft tissues unremarkable.  Negative for joint effusion.  Plate and screws transfixing proximal right radius and ulna partially visualized.     No acute right elbow abnormality. Electronically signed by: Javier Gilbert MD Date:    01/23/2022 Time:    18:32    X-Ray Wrist Complete Right    Result Date: 1/23/2022  EXAMINATION: XR WRIST COMPLETE 3 VIEWS RIGHT CLINICAL HISTORY: Fall FINDINGS: Four views of right breast show no fracture, dislocation, or destructive osseous lesion. 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  Plate and screws about distal radius and ulna partially visualized.  Soft tissues are unremarkable.     No acute right wrist abnormality. Electronically signed by: Javier Gilbert MD Date:    01/23/2022 Time:    18:32    X-Ray  Hand 3 View Right    Result Date: 1/23/2022  EXAMINATION: XR HAND COMPLETE 3 VIEW RIGHT CLINICAL HISTORY: FALL; Pain, unspecified FINDINGS: Three views of right hand show no fracture, dislocation, or destructive osseous lesion. Severe right 1st CMC joint osteoarthrosis is present with DIP joint osteoarthrosis diffusely.  Either severe joint space narrowing or ankylosis of right 1st IP joint occurs.  Soft tissues are unremarkable.     No acute right hand abnormality. Electronically signed by: Javier Gilbert MD Date:    01/23/2022 Time:    18:31    CT Head Without Contrast    Result Date: 1/25/2022  CMS MANDATED QUALITY DATA - CT RADIATION  436 All CT scans at this facility utilize dose modulation, iterative reconstruction, and/or weight based dosing when appropriate to reduce radiation dose to as low as reasonably achievable. CT HEAD WITHOUT IV CONTRAST CLINICAL HISTORY: 76 years Female sdh COMPARISON: January 24, 2022 CT head FINDINGS: Acute/subacute left frontal subdural hematoma is similar in size to the reference examination, measuring 5 mm in maximum thickness. It demonstrates areas of internal hyperattenuation and isoattenuation. Deep to this, small focus of subarachnoid hemorrhage, primarily affecting the posterior superior left frontal lobe, also similar to prior. No new intracranial hemorrhage. No significant midline shift. Ventricles and sulci are stable in size compared to prior. Mild periventricular white matter hypoattenuation consistent with microangiopathic change. Cerebellar hemispheres and brainstem are unremarkable. No calvarial lesion or fracture. Mastoid air cells are clear. Paranasal sinuses are clear. IMPRESSION: No significant change of left frontal subdural hematoma and left frontal subarachnoid hemorrhage compared to January 24. Electronically signed by:  Dada Evans MD  1/25/2022 9:36 AM [facility] Workstation: [id number]    CT Head Without Contrast    Result Date: 1/24/2022  EXAM DESCRIPTION: CT  HEAD WITHOUT CONTRAST 1/24/2022 12:58 AM CST CLINICAL HISTORY: 76 years, Female, Stroke, follow up COMPARISON: 1/23/2022. FINDINGS: Multiple transaxial tomograms of the brain were obtained from the base of the skull to the vertex without contrast. 2-D multiplanar reformats and the coronal and sagittal plane were performed and reviewed. This exam was performed according to our departmental dose-optimization protocol, which includes automated exposure control, adjustment of the mA and/or kV according to patient size and/or use of iterative reconstruction technique. Brain parenchyma demonstrate mild prominence of the sulci and gyri are corresponding to mild brain atrophy. There is minimal periventricular white matter changes of microvascular ischemia. As was described yesterday study there is a small area of subarachnoid hemorrhage within the left frontal and parietal sulci and in the left sylvian fissure. Again there is a left subdural hemorrhage overlying the left frontal temporal lobe similar to prior study measuring 6 mm in thickness on axial image 29. There is no midline shift and/or mass effect. There are no new areas of hemorrhage. Lateral ventricles and cisterns displace normal appearance. The calvarium is intact with no evidence for fracture. The visualized portions of the paranasal sinuses and orbits demonstrate to be clear. IMPRESSION: Stable left subdural hemorrhage overlying the left frontal temporal lobe measuring 6 mm in thickness. Stable small area of subarachnoid hemorrhage within the left frontal and parietal sulci and in the left Sylvian fissure. No new areas of hemorrhage are identified. Mild brain atrophy and minimal periventricular white matter changes of microvascular ischemia. Electronically signed by:  Manjinder Louis MD  1/24/2022 1:02 AM CST Workstation: [id number]    CT Head Without Contrast    Result Date: 1/23/2022  CMS MANDATED QUALITY DATA - CT RADIATION - 436 All CT scans at this facility  utilize dose modulation, iterative reconstruction, and/or weight based dosing when appropriate to reduce radiation dose to as low as reasonably achievable. Reason: FALL TECHNIQUE: Head CT without IV contrast. COMPARISON: None FINDINGS: Small amount of acute subarachnoid hemorrhage lies in the left frontal parietal sulci and in the left sylvian fissure. Acute left subdural hematoma overlying the left frontotemporal lobe reaches maximal thickness of 6 mm. Mild chronic small vessel ischemic changes affect the periventricular white matter bilaterally. Old lacunar infarct suggested in left cerebellum. Calcification occurs in right gal. No midline shift. Ventricles and cisterns are maintained. Right frontal scalp soft tissue swelling occurs and supraorbital location. Calvarium is intact. Visualized sinuses are clear. IMPRESSION: 1. Small volume of acute subarachnoid hemorrhage in left frontal parietal lobe sulci and sylvian fissure. 2. 6 mm acute left subdural hematoma. 3. 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 RESULT NOTIFICATION: These observations were discussed by Dr. Gilbert with, and acknowledged by, Dr. VARGHESE at 1/23/2022 6:45 PM CST Electronically signed by:  Javier Gilbert MD  1/23/2022 6:48 PM CST Workstation: [id number]    CT Cervical Spine Without Contrast    Result Date: 1/23/2022  CMS MANDATED QUALITY DATA - CT RADIATION - 436 All CT scans at this facility utilize dose modulation, iterative reconstruction, and/or weight based dosing when appropriate to reduce radiation dose to as low as reasonably achievable. Reason: FALL TECHNIQUE: Cervical spine CT without IV contrast obtained with coronal and sagittal reformations. COMPARISON:None FINDINGS: Negative for fracture. No epidural hematoma or prevertebral soft tissue swelling. Carotid artery calcifications, left greater than right, are evident. Minor biapical pleural parenchymal scarring is noted. At C2-C3,  severe right facet joint osteoarthrosis. At C3-C4, moderate right facet joint hypertrophic change and ankylosis is noted along with minor left facet joint osteoarthrosis. At C4-C5, moderate bilateral facet joint osteoarthrosis results in minor left neural foramen narrowing. At C5-C6, posterior osteophytic ridge and moderate left facet joint osteoarthrosis results in severe left neural foramen narrowing. At C6-C7, posterior osteophytic ridge and moderate left and mild right facet joint osteoarthrosis results in moderate left neural foramen narrowing. At C7-T1, no narrowing. Coronal and sagittal reformations show normal alignment without abnormal facet widening. IMPRESSION: Cervical spine degenerative changes, without acute abnormality. Electronically signed by:  Javier Gilbert MD  1/23/2022 6:51 PM [facility] Workstation: [id number]    CT Maxillofacial Without Contrast    Result Date: 1/23/2022  CMS MANDATED QUALITY DATA - CT RADIATION - 436 All CT scans at this facility utilize dose modulation, iterative reconstruction, and/or weight based dosing when appropriate to reduce radiation dose to as low as reasonably achievable. Reason: FALL TECHNIQUE: Maxillofacial CT without IV contrast obtained with Coronal and sagittal reformations. COMPARISON: None FINDINGS: Negative for acute facial fracture. The mandible is intact. Cervical spine degenerative changes partially visualized. Paranasal sinuses and visualized mastoids and middle ears are clear. Mild soft tissue swelling affects the supraorbital right frontal scalp. Orbital soft tissues are otherwise normal. Visualized facial soft tissues are otherwise unremarkable. Intracranial compartment discussed on head CT report from same day. Coronal and sagittal reformations confirm no depressed orbital floor fracture. Severe bilateral temporomandibular joint osteoarthrosis present with anteriorly located ossified loose bodies. IMPRESSION: Negative for facial fracture. Electronically  signed by:  Javier Gilbert MD  1/23/2022 6:54 PM CST Workstation: [id number]    X-Ray Chest AP Portable    Result Date: 1/23/2022  Reason: Chest Pain FALL FINDINGS: Portable chest at 1907 without comparisons shows normal cardiomediastinal silhouette. Lungs are clear. Pulmonary vasculature is normal. Degenerative changes affect bilateral glenohumeral joints. IMPRESSION: No acute cardiopulmonary abnormality. Electronically signed by:  Javier Gilbert MD  1/23/2022 7:11 PM CST Workstation: [id number]    Medications:  Reconciled Home Medications:      Medication List      START taking these medications    amLODIPine 2.5 MG tablet  Commonly known as: NORVASC  Take 1 tablet (2.5 mg total) by mouth once daily.     levETIRAcetam 500 MG Tab  Commonly known as: KEPPRA  Take 1 tablet (500 mg total) by mouth 2 (two) times daily.        CONTINUE taking these medications    baclofen 10 MG tablet  Commonly known as: LIORESAL  Take 10 mg by mouth 2 (two) times daily.     BETAPACE 80 MG tablet  Generic drug: sotaloL  Take 40 mg by mouth 2 (two) times daily.     buPROPion 100 MG tablet  Commonly known as: WELLBUTRIN  Take 100 mg by mouth 2 (two) times daily.     clonazePAM 1 MG tablet  Commonly known as: KlonoPIN  Take 1 mg by mouth 2 (two) times daily as needed for Anxiety.     EScitalopram oxalate 20 MG tablet  Commonly known as: LEXAPRO  Take 20 mg by mouth once daily.     pantoprazole 40 MG tablet  Commonly known as: PROTONIX  Take 40 mg by mouth once daily.     rosuvastatin 10 MG tablet  Commonly known as: CRESTOR  Take 10 mg by mouth once daily.        STOP taking these medications    celecoxib 200 MG capsule  Commonly known as: CeleBREX     ELIQUIS 5 mg Tab  Generic drug: apixaban     metoprolol succinate 50 MG 24 hr tablet  Commonly known as: TOPROL-XL            Indwelling Lines/Drains at time of discharge:   Lines/Drains/Airways     None                 Time spent on the discharge of patient: 33 minutes         Rachel SY  MD [place]  Department of Hospital Medicine  Formerly [facility]

## 2022-01-27 NOTE — PT/OT/SLP DISCHARGE
Occupational Therapy Discharge Summary    Madisyn Velasquez  MRN: [id number]   Principal Problem: SDH (subdural hematoma)      Patient Discharged from acute Occupational Therapy on 1/25/22.  Please refer to prior OT note dated 1/24/22 for functional status.    Assessment:      Patient appropriate for care in another setting.    Objective:     GOALS:   Multidisciplinary Problems     Occupational Therapy Goals        Problem: Occupational Therapy Goal    Goal Priority Disciplines Outcome Interventions   Occupational Therapy Goal     OT, PT/OT     Description: Goals to be met by: discharge    Patient will increase functional independence with ADLs by performing:    UE Dressing with Supervision.  LE Dressing with Supervision.  Grooming while standing at sink with Supervision.  Toileting from toilet with Supervision for hygiene and clothing management.   Toilet transfer to toilet with Supervision.                     Reasons for Discontinuation of Therapy Services  Transfer to alternate level of care.      Plan:     Patient Discharged to: Home with Home Health Service    1/27/2022   Pt aware and will try medication

## 2022-02-03 ENCOUNTER — TELEPHONE (OUTPATIENT)
Dept: NEUROSURGERY | Facility: CLINIC | Age: 77
End: 2022-02-03
Payer: MEDICARE

## 2022-02-03 NOTE — TELEPHONE ENCOUNTER
----- Message from Yudy Alcazar LPN sent at 2/2/2022  4:11 PM CST -----    ----- Message -----  From: Mulu Meyer  Sent: 2/2/2022   4:03 PM CST  To: Baljinder Yeboah Staff    Dr. Ryley Machado is requesting a [address] [place] [address] appointment with Obinna Gale, DO. Can you please assist with scheduling patient in clinic? I have scanned the referral and records into media mgr.     Thank you,   Mulu Kilpatrick

## 2022-02-25 ENCOUNTER — HOSPITAL ENCOUNTER (OUTPATIENT)
Dept: RADIOLOGY | Facility: HOSPITAL | Age: 77
Discharge: HOME | End: 2022-02-25
Attending: INTERNAL MEDICINE
Payer: MEDICARE

## 2022-02-25 DIAGNOSIS — S06.5XAA SUBDURAL HEMATOMA: ICD-10-CM

## 2022-02-25 PROCEDURE — 70450 CT HEAD WITHOUT CONTRAST: ICD-10-PCS | Mod: 26,,, | Performed by: RADIOLOGY

## 2022-02-25 PROCEDURE — 70450 CT HEAD/BRAIN W/O DYE: CPT | Mod: 26,,, | Performed by: RADIOLOGY

## 2022-02-25 PROCEDURE — 70450 CT HEAD/BRAIN W/O DYE: CPT | Mod: TC

## 2022-02-28 ENCOUNTER — OFFICE VISIT (OUTPATIENT)
Dept: NEUROSURGERY | Facility: CLINIC | Age: 77
End: 2022-02-28
Payer: MEDICARE

## 2022-02-28 VITALS — SYSTOLIC BLOOD PRESSURE: 144 MMHG | DIASTOLIC BLOOD PRESSURE: 77 MMHG | HEART RATE: 68 BPM

## 2022-02-28 DIAGNOSIS — R94.02 ABNORMAL BRAIN SCAN: Primary | ICD-10-CM

## 2022-02-28 DIAGNOSIS — S06.5XAA SUBDURAL HEMATOMA: ICD-10-CM

## 2022-02-28 DIAGNOSIS — R27.0 ATAXIA: Primary | ICD-10-CM

## 2022-02-28 DIAGNOSIS — M48.02 SPINAL STENOSIS, CERVICAL REGION: ICD-10-CM

## 2022-02-28 DIAGNOSIS — R53.1 WEAKNESS: ICD-10-CM

## 2022-02-28 DIAGNOSIS — S06.6X9S TRAUMATIC SUBARACHNOID HEMORRHAGE WITH LOSS OF CONSCIOUSNESS OF UNSPECIFIED DURATION, SEQUELA: ICD-10-CM

## 2022-02-28 PROCEDURE — 1100F PTFALLS ASSESS-DOCD GE2>/YR: CPT | Mod: CPTII,S$GLB,, | Performed by: NEUROLOGICAL SURGERY

## 2022-02-28 PROCEDURE — 1126F AMNT PAIN NOTED NONE PRSNT: CPT | Mod: CPTII,S$GLB,, | Performed by: NEUROLOGICAL SURGERY

## 2022-02-28 PROCEDURE — 1100F PR PT FALLS ASSESS DOC 2+ FALLS/FALL W/INJURY/YR: ICD-10-PCS | Mod: CPTII,S$GLB,, | Performed by: NEUROLOGICAL SURGERY

## 2022-02-28 PROCEDURE — 3078F PR MOST RECENT DIASTOLIC BLOOD PRESSURE < 80 MM HG: ICD-10-PCS | Mod: CPTII,S$GLB,, | Performed by: NEUROLOGICAL SURGERY

## 2022-02-28 PROCEDURE — 3288F FALL RISK ASSESSMENT DOCD: CPT | Mod: CPTII,S$GLB,, | Performed by: NEUROLOGICAL SURGERY

## 2022-02-28 PROCEDURE — 1159F PR MEDICATION LIST DOCUMENTED IN MEDICAL RECORD: ICD-10-PCS | Mod: CPTII,S$GLB,, | Performed by: NEUROLOGICAL SURGERY

## 2022-02-28 PROCEDURE — 3077F SYST BP >= 140 MM HG: CPT | Mod: CPTII,S$GLB,, | Performed by: NEUROLOGICAL SURGERY

## 2022-02-28 PROCEDURE — 1159F MED LIST DOCD IN RCRD: CPT | Mod: CPTII,S$GLB,, | Performed by: NEUROLOGICAL SURGERY

## 2022-02-28 PROCEDURE — 3288F PR FALLS RISK ASSESSMENT DOCUMENTED: ICD-10-PCS | Mod: CPTII,S$GLB,, | Performed by: NEUROLOGICAL SURGERY

## 2022-02-28 PROCEDURE — 99213 OFFICE O/P EST LOW 20 MIN: CPT | Mod: S$GLB,,, | Performed by: NEUROLOGICAL SURGERY

## 2022-02-28 PROCEDURE — 3077F PR MOST RECENT SYSTOLIC BLOOD PRESSURE >= 140 MM HG: ICD-10-PCS | Mod: CPTII,S$GLB,, | Performed by: NEUROLOGICAL SURGERY

## 2022-02-28 PROCEDURE — 3078F DIAST BP <80 MM HG: CPT | Mod: CPTII,S$GLB,, | Performed by: NEUROLOGICAL SURGERY

## 2022-02-28 PROCEDURE — 99213 PR OFFICE/OUTPT VISIT, EST, LEVL III, 20-29 MIN: ICD-10-PCS | Mod: S$GLB,,, | Performed by: NEUROLOGICAL SURGERY

## 2022-02-28 PROCEDURE — 1126F PR PAIN SEVERITY QUANTIFIED, NO PAIN PRESENT: ICD-10-PCS | Mod: CPTII,S$GLB,, | Performed by: NEUROLOGICAL SURGERY

## 2022-02-28 NOTE — PROGRESS NOTES
The patient follows up today regarding a small left frontal traumatic subdural hematoma which developed tells after she fell face forward in late January 2022. This was managed non operatively.  She was taking Eliquis prior to hospitalization for paroxysmal atrial fibrillation.  She has not resumed Eliquis.  Presently, she denies headache, focal extremity weakness, paresthesias.  She denies recent falls.  She does endorse increasing neck stiffness, generalized weakness, and imbalance.  She notes that she used to go to the gym frequently.  Now she states she can barely get in the car.  She reports difficulty elevating her arms above her head for long periods.  She denies hand clumsiness.  She denies bowel or bladder incontinence.    Repeat head CT imaging obtained February 25, 2002 was reviewed and findings outlined below:    Resolution of left frontal parietal subdural hematoma and subarachnoid hemorrhage.  Small calcification probably post infective in the right gal unchanged from the prior study.  The rest of the head CT is negative.    Exam:    Awake, alert, oriented to person place and time.    Cranial nerves 2-12 grossly intact.    Strength is graded 5/5 in all muscle groups.    Sensation is grossly intact throughout.    Gait and stance are normal  Momo sign absent bilaterally  Brisk bilateral upper extremities  DTRs 2/4 bilateral lower extremity  Romberg positive  Gait somewhat wide-based    Analysis:  She may resume Eliquis if clinically indicated.  I ordered MRI cervical spine rule out stenosis as well as MRI brain with and without gadolinium and MRA brain for further evaluation of the nonspecific calcification involving the gal.  We will see her back with the studies for review and further recommendations.

## 2022-03-02 ENCOUNTER — TELEPHONE (OUTPATIENT)
Dept: NEUROSURGERY | Facility: CLINIC | Age: 77
End: 2022-03-02
Payer: MEDICARE

## 2022-03-02 NOTE — TELEPHONE ENCOUNTER
----- Message from Deedee Pendleton sent at 3/2/2022 11:25 AM CST -----  Contact: Patient  Patient call in regarding advice on seizures and high blood pressure medication     Please advice     Patient can be reach [phone]

## 2022-03-02 NOTE — TELEPHONE ENCOUNTER
Returned call to pt. Should would like to know if she can discontinue seizure and hypertension medication that she was started on while admitted. Informed her that I will discuss this with Dr. Gale when he returns to clinic tomorrow and call her back with his recommendations at that time. Pt voiced understanding.

## 2022-03-04 NOTE — TELEPHONE ENCOUNTER
Spoke with Dr. Gale. Returned call to pt to inform her to wean Keppra by taking 1 tablet for the next seven days. She should contact her PCP to discuss discontinuing BP medication. Pt voiced understanding.

## 2022-03-07 ENCOUNTER — TELEPHONE (OUTPATIENT)
Dept: NEUROSURGERY | Facility: CLINIC | Age: 77
End: 2022-03-07
Payer: MEDICARE

## 2022-03-07 NOTE — TELEPHONE ENCOUNTER
----- Message from Jania Gardner RN sent at 3/4/2022  3:35 PM CST -----    ----- Message -----  From: Stacey M Lefort  Sent: 3/4/2022   3:12 PM CST  To: Jania Gardner RN    Pt needs to reschedule her April appt- she can be reached at [phone].  Thank you.

## 2022-03-07 NOTE — TELEPHONE ENCOUNTER
Returned call to pt and confirmed follow up appt with Dr. Gale in April. Pt does not wish to reschedule the appt.

## 2022-03-23 ENCOUNTER — TELEPHONE (OUTPATIENT)
Dept: NEUROSURGERY | Facility: CLINIC | Age: 77
End: 2022-03-23
Payer: MEDICARE

## 2022-03-23 NOTE — TELEPHONE ENCOUNTER
Returned call to pt and rescheduled appt per her request. She reports that she will keep scheduled scans. Instructed pt to contact our office with any questions or concerns. Pt voiced understanding.

## 2022-03-23 NOTE — TELEPHONE ENCOUNTER
----- Message from Stacey M Lefort sent at 3/23/2022  8:58 AM CDT -----  Pt called to say she will need to reschedule her appt. She needs a callback at [phone].  Thank you.

## 2022-03-31 ENCOUNTER — HOSPITAL ENCOUNTER (OUTPATIENT)
Dept: RADIOLOGY | Facility: HOSPITAL | Age: 77
Discharge: HOME | End: 2022-03-31
Attending: NEUROLOGICAL SURGERY
Payer: MEDICARE

## 2022-03-31 DIAGNOSIS — R94.02 ABNORMAL BRAIN SCAN: ICD-10-CM

## 2022-03-31 DIAGNOSIS — S06.6X9S TRAUMATIC SUBARACHNOID HEMORRHAGE WITH LOSS OF CONSCIOUSNESS OF UNSPECIFIED DURATION, SEQUELA: ICD-10-CM

## 2022-03-31 DIAGNOSIS — M48.02 SPINAL STENOSIS, CERVICAL REGION: ICD-10-CM

## 2022-03-31 PROCEDURE — 72141 MRI CERVICAL SPINE WITHOUT CONTRAST: ICD-10-PCS | Mod: 26,,, | Performed by: RADIOLOGY

## 2022-03-31 PROCEDURE — 70544 MR ANGIOGRAPHY HEAD W/O DYE: CPT | Mod: TC

## 2022-03-31 PROCEDURE — 72141 MRI NECK SPINE W/O DYE: CPT | Mod: TC

## 2022-03-31 PROCEDURE — 70551 MRI BRAIN STEM W/O DYE: CPT | Mod: 26,,, | Performed by: RADIOLOGY

## 2022-03-31 PROCEDURE — 70551 MRI BRAIN STEM W/O DYE: CPT | Mod: TC

## 2022-03-31 PROCEDURE — 70544 MRA BRAIN WITHOUT CONTRAST: ICD-10-PCS | Mod: 26,59,, | Performed by: RADIOLOGY

## 2022-03-31 PROCEDURE — 70544 MR ANGIOGRAPHY HEAD W/O DYE: CPT | Mod: 26,59,, | Performed by: RADIOLOGY

## 2022-03-31 PROCEDURE — 70551 MRI BRAIN WITHOUT CONTRAST: ICD-10-PCS | Mod: 26,,, | Performed by: RADIOLOGY

## 2022-03-31 PROCEDURE — 72141 MRI NECK SPINE W/O DYE: CPT | Mod: 26,,, | Performed by: RADIOLOGY

## 2022-04-28 ENCOUNTER — OFFICE VISIT (OUTPATIENT)
Dept: NEUROSURGERY | Facility: CLINIC | Age: 77
End: 2022-04-28
Payer: MEDICARE

## 2022-04-28 VITALS — DIASTOLIC BLOOD PRESSURE: 71 MMHG | HEART RATE: 66 BPM | SYSTOLIC BLOOD PRESSURE: 153 MMHG

## 2022-04-28 DIAGNOSIS — R27.0 ATAXIA: Primary | ICD-10-CM

## 2022-04-28 PROCEDURE — 3078F DIAST BP <80 MM HG: CPT | Mod: CPTII,S$GLB,, | Performed by: NEUROLOGICAL SURGERY

## 2022-04-28 PROCEDURE — 3288F FALL RISK ASSESSMENT DOCD: CPT | Mod: CPTII,S$GLB,, | Performed by: NEUROLOGICAL SURGERY

## 2022-04-28 PROCEDURE — 99213 OFFICE O/P EST LOW 20 MIN: CPT | Mod: S$GLB,,, | Performed by: NEUROLOGICAL SURGERY

## 2022-04-28 PROCEDURE — 3077F PR MOST RECENT SYSTOLIC BLOOD PRESSURE >= 140 MM HG: ICD-10-PCS | Mod: CPTII,S$GLB,, | Performed by: NEUROLOGICAL SURGERY

## 2022-04-28 PROCEDURE — 99213 PR OFFICE/OUTPT VISIT, EST, LEVL III, 20-29 MIN: ICD-10-PCS | Mod: S$GLB,,, | Performed by: NEUROLOGICAL SURGERY

## 2022-04-28 PROCEDURE — 1159F MED LIST DOCD IN RCRD: CPT | Mod: CPTII,S$GLB,, | Performed by: NEUROLOGICAL SURGERY

## 2022-04-28 PROCEDURE — 3077F SYST BP >= 140 MM HG: CPT | Mod: CPTII,S$GLB,, | Performed by: NEUROLOGICAL SURGERY

## 2022-04-28 PROCEDURE — 1101F PT FALLS ASSESS-DOCD LE1/YR: CPT | Mod: CPTII,S$GLB,, | Performed by: NEUROLOGICAL SURGERY

## 2022-04-28 PROCEDURE — 3078F PR MOST RECENT DIASTOLIC BLOOD PRESSURE < 80 MM HG: ICD-10-PCS | Mod: CPTII,S$GLB,, | Performed by: NEUROLOGICAL SURGERY

## 2022-04-28 PROCEDURE — 1159F PR MEDICATION LIST DOCUMENTED IN MEDICAL RECORD: ICD-10-PCS | Mod: CPTII,S$GLB,, | Performed by: NEUROLOGICAL SURGERY

## 2022-04-28 PROCEDURE — 1101F PR PT FALLS ASSESS DOC 0-1 FALLS W/OUT INJ PAST YR: ICD-10-PCS | Mod: CPTII,S$GLB,, | Performed by: NEUROLOGICAL SURGERY

## 2022-04-28 PROCEDURE — 3288F PR FALLS RISK ASSESSMENT DOCUMENTED: ICD-10-PCS | Mod: CPTII,S$GLB,, | Performed by: NEUROLOGICAL SURGERY

## 2022-04-28 RX ORDER — BACLOFEN 10 MG/1
TABLET ORAL
COMMUNITY
Start: 2022-03-02 | End: 2022-08-03

## 2022-04-28 RX ORDER — APIXABAN 5 MG/1
5 TABLET, FILM COATED ORAL 2 TIMES DAILY
COMMUNITY
Start: 2022-03-08

## 2022-04-28 RX ORDER — CELECOXIB 200 MG/1
CAPSULE ORAL
COMMUNITY
Start: 2022-02-23 | End: 2022-05-17 | Stop reason: SINTOL

## 2022-04-28 NOTE — PROGRESS NOTES
"April 20, 2022:  Patient returns with imaging for review.  MRI MRA brain negative.  MRI cervical spine shows mild spinal stenosis and multifocal foraminal stenosis.  She reports no worsening imbalance.  She denies focal extremity weakness or numbness.  She reports no recent falls.  She has resumed Eliquis per her cardiologist.    Exam:     Awake, alert, oriented to person place and time.    Cranial nerves 2-12 grossly intact.    Strength is graded 5/5 in all muscle groups.    Sensation is grossly intact throughout.    Gait and stance are normal  Momo sign absent bilaterally  Brisk bilateral upper extremities  DTRs 2/4 bilateral lower extremity  Romberg positive  Gait somewhat wide-based    Analysis:  There is no role for neurosurgical intervention.  I recommended she continue physical therapy for gait training.  We will be happy to re-evaluate her as needed    2/28/22: The patient follows up today regarding a small left frontal traumatic subdural hematoma which developed tells after she fell face forward in late January 2022. This was managed non operatively.  She was taking Eliquis prior to hospitalization for paroxysmal atrial fibrillation.  She has not resumed Eliquis.  Presently, she denies headache, focal extremity weakness, paresthesias.  She denies recent falls.  She does endorse increasing neck stiffness, generalized weakness, and imbalance.  She notes that she used to go to the gym frequently.  Now she states she can barely get in the car.  She reports difficulty elevating her arms above her head for long periods.  She denies hand clumsiness.  She denies bowel or bladder incontinence.     Repeat head CT imaging obtained February 25, 2002 was reviewed and findings outlined below:     "Resolution of left frontal parietal subdural hematoma and subarachnoid hemorrhage.  Small calcification probably post infective in the right gal unchanged from the prior study.  The rest of the head CT is " "negative"     Exam:     Awake, alert, oriented to person place and time.    Cranial nerves 2-12 grossly intact.    Strength is graded 5/5 in all muscle groups.    Sensation is grossly intact throughout.    Gait and stance are normal  Momo sign absent bilaterally  Brisk bilateral upper extremities  DTRs 2/4 bilateral lower extremity  Romberg positive  Gait somewhat wide-based     Analysis:  She may resume Eliquis if clinically indicated.  I ordered MRI cervical spine rule out stenosis as well as MRI brain with and without gadolinium and MRA brain for further evaluation of the nonspecific calcification involving the gal.  We will see her back with the studies for review and further recommendations.  "

## 2022-05-16 ENCOUNTER — TELEPHONE (OUTPATIENT)
Dept: PRIMARY CARE CLINIC | Facility: CLINIC | Age: 77
End: 2022-05-16
Payer: MEDICARE

## 2022-05-16 PROBLEM — I10 PRIMARY HYPERTENSION: Status: ACTIVE | Noted: 2022-05-16

## 2022-05-16 PROBLEM — Z79.899 POLYPHARMACY: Status: ACTIVE | Noted: 2022-05-16

## 2022-05-16 PROBLEM — F13.94: Status: ACTIVE | Noted: 2022-05-16

## 2022-05-16 PROBLEM — F33.9 EPISODE OF RECURRENT MAJOR DEPRESSIVE DISORDER: Status: ACTIVE | Noted: 2022-05-16

## 2022-05-16 PROBLEM — F41.9 ANXIETY: Status: ACTIVE | Noted: 2022-05-16

## 2022-05-16 PROBLEM — G31.9 CEREBRAL ATROPHY, MILD: Status: ACTIVE | Noted: 2022-05-16

## 2022-05-16 NOTE — TELEPHONE ENCOUNTER
Spoke to patient and she was looking to establish with new geriatrician, as hers retired. Scheduled patient with Dr. Mays tomorrow 5/17. Verbalized understanding.

## 2022-05-16 NOTE — TELEPHONE ENCOUNTER
----- Message from Jacey Carroll sent at 5/16/2022  1:39 PM CDT -----  Regarding: advice  Contact: seth  Type: Needs Medical Advice    Who Called:  seth  Symptoms (please be specific):  n/a  How long has patient had these symptoms:  n/a  Pharmacy name and phone #:  n/a  Best Call Back Number: [phone]    Additional Information: looking for a geriatric dr, asking for a call to discuss if provider would be the right person to schedule with

## 2022-05-17 ENCOUNTER — OFFICE VISIT (OUTPATIENT)
Dept: PRIMARY CARE CLINIC | Facility: CLINIC | Age: 77
End: 2022-05-17
Payer: MEDICARE

## 2022-05-17 VITALS
SYSTOLIC BLOOD PRESSURE: 115 MMHG | BODY MASS INDEX: 26.88 KG/M2 | OXYGEN SATURATION: 95 % | HEART RATE: 68 BPM | HEIGHT: 63 IN | TEMPERATURE: 98 F | WEIGHT: 151.69 LBS | DIASTOLIC BLOOD PRESSURE: 60 MMHG

## 2022-05-17 DIAGNOSIS — Z79.899 POLYPHARMACY: ICD-10-CM

## 2022-05-17 DIAGNOSIS — Z11.59 NEED FOR HEPATITIS C SCREENING TEST: ICD-10-CM

## 2022-05-17 DIAGNOSIS — Z00.00 ENCOUNTER FOR MEDICAL EXAMINATION TO ESTABLISH CARE: Primary | ICD-10-CM

## 2022-05-17 DIAGNOSIS — G31.9 CEREBRAL ATROPHY, MILD: ICD-10-CM

## 2022-05-17 DIAGNOSIS — I48.11 LONGSTANDING PERSISTENT ATRIAL FIBRILLATION: Chronic | ICD-10-CM

## 2022-05-17 DIAGNOSIS — I10 PRIMARY HYPERTENSION: ICD-10-CM

## 2022-05-17 DIAGNOSIS — F13.94: ICD-10-CM

## 2022-05-17 DIAGNOSIS — E53.8 DEFICIENCY OF OTHER SPECIFIED B GROUP VITAMINS: ICD-10-CM

## 2022-05-17 DIAGNOSIS — E78.5 HYPERLIPIDEMIA, UNSPECIFIED HYPERLIPIDEMIA TYPE: ICD-10-CM

## 2022-05-17 DIAGNOSIS — K21.9 GASTROESOPHAGEAL REFLUX DISEASE WITHOUT ESOPHAGITIS: ICD-10-CM

## 2022-05-17 DIAGNOSIS — F33.1 MODERATE EPISODE OF RECURRENT MAJOR DEPRESSIVE DISORDER: ICD-10-CM

## 2022-05-17 DIAGNOSIS — F41.9 ANXIETY: ICD-10-CM

## 2022-05-17 PROCEDURE — 99205 PR OFFICE/OUTPT VISIT, NEW, LEVL V, 60-74 MIN: ICD-10-PCS | Mod: S$GLB,,, | Performed by: INTERNAL MEDICINE

## 2022-05-17 PROCEDURE — 3074F SYST BP LT 130 MM HG: CPT | Mod: CPTII,S$GLB,, | Performed by: INTERNAL MEDICINE

## 2022-05-17 PROCEDURE — 1126F PR PAIN SEVERITY QUANTIFIED, NO PAIN PRESENT: ICD-10-PCS | Mod: CPTII,S$GLB,, | Performed by: INTERNAL MEDICINE

## 2022-05-17 PROCEDURE — 3074F PR MOST RECENT SYSTOLIC BLOOD PRESSURE < 130 MM HG: ICD-10-PCS | Mod: CPTII,S$GLB,, | Performed by: INTERNAL MEDICINE

## 2022-05-17 PROCEDURE — 99999 PR PBB SHADOW E&M-EST. PATIENT-LVL III: ICD-10-PCS | Mod: PBBFAC,,, | Performed by: INTERNAL MEDICINE

## 2022-05-17 PROCEDURE — 99205 OFFICE O/P NEW HI 60 MIN: CPT | Mod: S$GLB,,, | Performed by: INTERNAL MEDICINE

## 2022-05-17 PROCEDURE — 3288F FALL RISK ASSESSMENT DOCD: CPT | Mod: CPTII,S$GLB,, | Performed by: INTERNAL MEDICINE

## 2022-05-17 PROCEDURE — 3288F PR FALLS RISK ASSESSMENT DOCUMENTED: ICD-10-PCS | Mod: CPTII,S$GLB,, | Performed by: INTERNAL MEDICINE

## 2022-05-17 PROCEDURE — 1100F PR PT FALLS ASSESS DOC 2+ FALLS/FALL W/INJURY/YR: ICD-10-PCS | Mod: CPTII,S$GLB,, | Performed by: INTERNAL MEDICINE

## 2022-05-17 PROCEDURE — 3078F PR MOST RECENT DIASTOLIC BLOOD PRESSURE < 80 MM HG: ICD-10-PCS | Mod: CPTII,S$GLB,, | Performed by: INTERNAL MEDICINE

## 2022-05-17 PROCEDURE — 99999 PR PBB SHADOW E&M-EST. PATIENT-LVL III: CPT | Mod: PBBFAC,,, | Performed by: INTERNAL MEDICINE

## 2022-05-17 PROCEDURE — 1100F PTFALLS ASSESS-DOCD GE2>/YR: CPT | Mod: CPTII,S$GLB,, | Performed by: INTERNAL MEDICINE

## 2022-05-17 PROCEDURE — 1126F AMNT PAIN NOTED NONE PRSNT: CPT | Mod: CPTII,S$GLB,, | Performed by: INTERNAL MEDICINE

## 2022-05-17 PROCEDURE — 3078F DIAST BP <80 MM HG: CPT | Mod: CPTII,S$GLB,, | Performed by: INTERNAL MEDICINE

## 2022-05-17 RX ORDER — TRAZODONE HYDROCHLORIDE 50 MG/1
50 TABLET ORAL NIGHTLY
Qty: 90 TABLET | Refills: 0 | Status: SHIPPED | OUTPATIENT
Start: 2022-05-17 | End: 2022-08-09
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
## 2022-06-07 RX ORDER — ROSUVASTATIN CALCIUM 10 MG/1
10 TABLET, COATED ORAL DAILY
Qty: 30 TABLET | Refills: 3 | Status: SHIPPED | OUTPATIENT
Start: 2022-06-07

## 2022-06-22 NOTE — TELEPHONE ENCOUNTER
----- Message from Peri El sent at 6/22/2022  4:29 PM CDT -----  Type:  RX Refill Request    Who Called: pt  Refill or New Rx:refill  RX Name and Strength:EScitalopram oxalate (LEXAPRO) 20 MG tablet  How is the patient currently taking it? (ex. 1XDay):Take 20 mg by mouth once daily. -  Is this a 30 day or 90 day RX:30  Preferred Pharmacy with phone number:[id number] - [facility] - [address] AT [place] [address]   Phone: [phone]  Fax:  [phone]        Local or Mail Order:local  Ordering Provider:Dr Mays  Would the patient rather a call back or a response via [facility]? call  Best Call Back Number:[phone]  Additional Information: pt is requesting a refill she is a new pt to this provider and she is requesting a phone call once its completed

## 2022-06-23 RX ORDER — ESCITALOPRAM OXALATE 20 MG/1
20 TABLET ORAL DAILY
Qty: 30 TABLET | Refills: 11 | Status: SHIPPED | OUTPATIENT
Start: 2022-06-23 | End: 2023-07-10

## 2022-07-28 RX ORDER — TRAZODONE HYDROCHLORIDE 50 MG/1
TABLET ORAL
Qty: 90 TABLET | Refills: 0 | OUTPATIENT
Start: 2022-07-28

## 2022-07-28 NOTE — TELEPHONE ENCOUNTER
"Spoke to patient in regards to medication refill. Pt states she is not out of the medication yet, she probably has 2 weeks left. Pt states that she fills up her medication container weekly and when she is almost out she likes to request the refill then. Is this appropriate? I stated for patient to call when she had about 4 tablets left and she stated "well this is going to be an aggravation, I thought I switched to her so this would be easier." I explained to patient that this is a controlled substance and it cannot be filled early. Please advise and correct me if I am wrong. Thanks.   "

## 2022-07-28 NOTE — TELEPHONE ENCOUNTER
Marge you are 100% correct. She may contact us for refill when theres  a week of medication left.   For future reference. Sleep medications and anything controlled will not be prescribe d before its due so sorry for her aggravation. Additionally, this medication is typically prescribed by the same psychiatrist who is prescribing her psych medications.

## 2022-08-03 ENCOUNTER — OFFICE VISIT (OUTPATIENT)
Dept: PRIMARY CARE CLINIC | Facility: CLINIC | Age: 77
End: 2022-08-03
Payer: MEDICARE

## 2022-08-03 VITALS
WEIGHT: 154.19 LBS | SYSTOLIC BLOOD PRESSURE: 115 MMHG | BODY MASS INDEX: 27.32 KG/M2 | HEART RATE: 77 BPM | OXYGEN SATURATION: 99 % | DIASTOLIC BLOOD PRESSURE: 77 MMHG | TEMPERATURE: 98 F | HEIGHT: 63 IN

## 2022-08-03 DIAGNOSIS — Z79.899 POLYPHARMACY: ICD-10-CM

## 2022-08-03 DIAGNOSIS — I10 PRIMARY HYPERTENSION: ICD-10-CM

## 2022-08-03 DIAGNOSIS — E66.3 OVERWEIGHT (BMI 25.0-29.9): ICD-10-CM

## 2022-08-03 DIAGNOSIS — G47.00 INSOMNIA, UNSPECIFIED TYPE: ICD-10-CM

## 2022-08-03 DIAGNOSIS — F41.9 ANXIETY: Primary | ICD-10-CM

## 2022-08-03 PROCEDURE — 1159F MED LIST DOCD IN RCRD: CPT | Mod: CPTII,S$GLB,, | Performed by: INTERNAL MEDICINE

## 2022-08-03 PROCEDURE — 3288F PR FALLS RISK ASSESSMENT DOCUMENTED: ICD-10-PCS | Mod: CPTII,S$GLB,, | Performed by: INTERNAL MEDICINE

## 2022-08-03 PROCEDURE — 1160F RVW MEDS BY RX/DR IN RCRD: CPT | Mod: CPTII,S$GLB,, | Performed by: INTERNAL MEDICINE

## 2022-08-03 PROCEDURE — 1101F PT FALLS ASSESS-DOCD LE1/YR: CPT | Mod: CPTII,S$GLB,, | Performed by: INTERNAL MEDICINE

## 2022-08-03 PROCEDURE — 99499 UNLISTED E&M SERVICE: CPT | Mod: HCNC,S$GLB,, | Performed by: INTERNAL MEDICINE

## 2022-08-03 PROCEDURE — 99999 PR PBB SHADOW E&M-EST. PATIENT-LVL III: ICD-10-PCS | Mod: PBBFAC,,, | Performed by: INTERNAL MEDICINE

## 2022-08-03 PROCEDURE — 3078F PR MOST RECENT DIASTOLIC BLOOD PRESSURE < 80 MM HG: ICD-10-PCS | Mod: CPTII,S$GLB,, | Performed by: INTERNAL MEDICINE

## 2022-08-03 PROCEDURE — 99215 OFFICE O/P EST HI 40 MIN: CPT | Mod: S$GLB,,, | Performed by: INTERNAL MEDICINE

## 2022-08-03 PROCEDURE — 1126F AMNT PAIN NOTED NONE PRSNT: CPT | Mod: CPTII,S$GLB,, | Performed by: INTERNAL MEDICINE

## 2022-08-03 PROCEDURE — 1126F PR PAIN SEVERITY QUANTIFIED, NO PAIN PRESENT: ICD-10-PCS | Mod: CPTII,S$GLB,, | Performed by: INTERNAL MEDICINE

## 2022-08-03 PROCEDURE — 3074F SYST BP LT 130 MM HG: CPT | Mod: CPTII,S$GLB,, | Performed by: INTERNAL MEDICINE

## 2022-08-03 PROCEDURE — 1101F PR PT FALLS ASSESS DOC 0-1 FALLS W/OUT INJ PAST YR: ICD-10-PCS | Mod: CPTII,S$GLB,, | Performed by: INTERNAL MEDICINE

## 2022-08-03 PROCEDURE — 3288F FALL RISK ASSESSMENT DOCD: CPT | Mod: CPTII,S$GLB,, | Performed by: INTERNAL MEDICINE

## 2022-08-03 PROCEDURE — 3078F DIAST BP <80 MM HG: CPT | Mod: CPTII,S$GLB,, | Performed by: INTERNAL MEDICINE

## 2022-08-03 PROCEDURE — 99499 RISK ADDL DX/OHS AUDIT: ICD-10-PCS | Mod: HCNC,S$GLB,, | Performed by: INTERNAL MEDICINE

## 2022-08-03 PROCEDURE — 3074F PR MOST RECENT SYSTOLIC BLOOD PRESSURE < 130 MM HG: ICD-10-PCS | Mod: CPTII,S$GLB,, | Performed by: INTERNAL MEDICINE

## 2022-08-03 PROCEDURE — 99999 PR PBB SHADOW E&M-EST. PATIENT-LVL III: CPT | Mod: PBBFAC,,, | Performed by: INTERNAL MEDICINE

## 2022-08-03 PROCEDURE — 99215 PR OFFICE/OUTPT VISIT, EST, LEVL V, 40-54 MIN: ICD-10-PCS | Mod: S$GLB,,, | Performed by: INTERNAL MEDICINE

## 2022-08-03 PROCEDURE — 1160F PR REVIEW ALL MEDS BY PRESCRIBER/CLIN PHARMACIST DOCUMENTED: ICD-10-PCS | Mod: CPTII,S$GLB,, | Performed by: INTERNAL MEDICINE

## 2022-08-03 PROCEDURE — 1159F PR MEDICATION LIST DOCUMENTED IN MEDICAL RECORD: ICD-10-PCS | Mod: CPTII,S$GLB,, | Performed by: INTERNAL MEDICINE

## 2022-08-03 RX ORDER — BUPROPION HYDROCHLORIDE 100 MG/1
100 TABLET ORAL 2 TIMES DAILY
Qty: 90 TABLET | Refills: 0 | Status: SHIPPED | OUTPATIENT
Start: 2022-08-03 | End: 2022-10-28

## 2022-08-03 RX ORDER — CLONAZEPAM 0.5 MG/1
0.5 TABLET ORAL NIGHTLY
Qty: 30 TABLET | Refills: 0 | Status: SHIPPED | OUTPATIENT
Start: 2022-08-03 | End: 2022-11-14 | Stop reason: SDUPTHER

## 2022-08-03 NOTE — PROGRESS NOTES
INTERNAL MEDICINE PROGRESS/URGENT CARE NOTE    CHIEF COMPLAINT     Chief Complaint   Patient presents with    Follow-up     3 mo f/u     Urinary Incontinence    Irregular Heart Beat       HPI     Madisyn Velasquez is a 76 y.o.female who presents with a PMHx of  Afib, HTN, HLD,  Depression/anxiety, GERD, fall with post traumatic SAH who presents today for follow up.   urniary incontinence: says she has urge and stress incontinence. Today we discussed medications, she is not agreeable but will try the other ways such as pelvic floors excercises.     irrgeular heart beat: this is ongoing. Note she has Afib, has seen her cardiologist for the same complaint and was placed on a holter not so long ago and she says it was all reassuring. She denies any fast heartbeat but says it feels irregular. Likely her afib. No chest pain, no sob. No other associated symptoms.     Forgetfulness: says she elena go to the refridgerator for something and instead will open a cupbar and forget what she went their for.     HTN: denies history of HTN. Note chart has multiple elevated readings. She was prescribed norvasc but had not been taking it. Today she reports her BP is very well controlled. Her BP today is 115/77. She still holds true that she does not have HTN.      Depression: ++PHQ9 not done today. At the last visit, she described loss of interest in doing most things she previously enjoyed such as going to water aerobics class, insomnia, +change in appetite. Previously took wellbutrin and lexapro but says she has been out of the wellbutrin for months on accident. Recently got a refill of her medication two days and have restarted it so she is waiting for it to take effect.   Today, she reports that her mood is much much improved. Her  is happoer. The wellbutrin seems to be working. sheh as more energy, shes taking less naps.   Regarding her activity level small improvements. We did again discuss her doing move activity and  trying to get some exercise. Also encouraged her to socialize and make some friends.   Appetite is increased. She is concerned about her weight and wanted to discuss a medication. We d  Sleep sleeping very well with a half the clonipin. Gets at least 8 hours every night. The trazodone did not work so she will discontinue.      Repeated falls with recent SAH: two falls were due to tripping over her husbands oxygen tube. Today she says her balance is great. Again reiterated my concern re being on clonipin with her fall history. She is unable to sleep without it.     Insomnia: previously took clonipin and at the last visit, she agreed to trial trazodone, which she has been on for 3 months and today reports that it did not help at all. Greatly, she was able to titrate down on the clonipin from 2mg to 0.5mg and shes now sleeping at least 8 hours. And have stopped napping during the day. So her sleep is now stable and shes getting adequate sleep. shes also likely sleeping better because of the reduce napping. As she has tritrated down to the lowest dose of clopinin, I will provide her prescription. Patient has been instructed that if she requires more medication if her sleep were to worsen for example, she would certainly need to see psychiatry to get her prescription.      Polypharmacy: long discussion re medication use. Advised against the use of celebrex and eliquis and she agreed to stop the celebrex because it doesn't seem to help her anyways. Advised against the  Chronic long term use of clonipin due to her fall history and being on a NOAC. She did try  Very hard to wean down off this. She uses it for insomnia, and agreed to trial of trazodone instead while we wean her slowly of the clonazepam. Also advised good sleep hygiene.   Today she reports that she got down to 1/2mg and wasn't able to titrate down from there. When she took a quarter of the tab, she stayed up for hours unable to fall asleep.     Past Medical  "History:  Past Medical History:   Diagnosis Date    Anxiety     Constipation        Home Medications:  Prior to Admission medications    Medication Sig Start Date End Date Taking? Authorizing Provider   baclofen (LIORESAL) 10 MG tablet TAKE TWO TABLETS BY MOUTH NIGHTLY AT BEDTIME 3/2/22   Historical Provider   buPROPion (WELLBUTRIN) 100 MG tablet Take 100 mg by mouth 2 (two) times daily.    Historical Provider   clonazePAM (KLONOPIN) 1 MG tablet Take 1 mg by mouth 2 (two) times daily as needed for Anxiety.    Historical Provider   ELIQUIS 5 mg Tab Take 5 mg by mouth 2 (two) times daily. 3/8/22   Historical Provider   EScitalopram oxalate (LEXAPRO) 20 MG tablet Take 1 tablet (20 mg total) by mouth once daily. 6/23/22   Ela Mays MD   rosuvastatin (CRESTOR) 10 MG tablet Take 1 tablet (10 mg total) by mouth once daily. 6/7/22   Ela Mays MD   sotaloL (BETAPACE) 80 MG tablet Take 40 mg by mouth 2 (two) times daily.    Historical Provider   traZODone (DESYREL) 50 MG tablet Take 1 tablet (50 mg total) by mouth every evening. 5/17/22 8/15/22  Ela Mays MD       Review of Systems:  Review of Systems      PHYSICAL EXAM     /77 (BP Location: Left arm, Patient Position: Sitting, BP Method: Large (Manual))   Pulse 77   Temp 98.2 °F (36.8 °C)   Ht 5' 3" (1.6 m)   Wt 70 kg (154 lb 3.4 oz)   SpO2 99%   BMI 27.32 kg/m²     GEN - A+OX4, NAD   HEENT - PERRL, EOMI, OP clear  Neck - No thyromegaly or cervical LAD. No thyroid masses felt.  CV - RRR, no m/r   Chest - CTAB, no wheezing or rhonchi  Abd - S/NT/ND/+BS.   Ext - 2+BDP and radial pulses. No C/C/E.  Skin - No rash.    LABS     LABS REVIEWED AND recently ordered labs not completed     ASSESSMENT/PLAN     Madisyn Velasquez is a 76 y.o. female with  Diagnoses and all orders for this visit:    Depression, improved control  -continue with lexapro. Doing much better with wellbutrin but will add on contrave for weight control  -encouraged " counselling as this will help with her caregiver duties     Primary hypertension  -very well controlled     Polypharmacy  -discussed worrisome side effects of the psychotropics betsy clonipin.       RTC in 6 months, sooner if needed and depending on labs.    Ela Mays MD  Board Certified Internist/Geriatrician  [facility] ([place])

## 2022-08-05 ENCOUNTER — TELEPHONE (OUTPATIENT)
Dept: PRIMARY CARE CLINIC | Facility: CLINIC | Age: 77
End: 2022-08-05
Payer: MEDICARE

## 2022-08-05 NOTE — TELEPHONE ENCOUNTER
----- Message from Stella Montenegro sent at 8/5/2022 12:37 PM CDT -----  Contact: Pt  Type: Needs Medical Advice    Who Called:  Pt    Best Call Back Number: [phone]    Additional Information: Please call back regarding pt's meds.  Thanks.

## 2022-08-05 NOTE — TELEPHONE ENCOUNTER
Initially told her that actually no need. She was on a lower dose of wellbutrin. Combine that with the contrave dose and its not over the recommended limit and may actually help out a bit. So I represcribed the well butrin, and I would like her to continue both. Tell her thanks for checking with me. But I think both will be beneficial to her weight loss goals.

## 2022-08-05 NOTE — TELEPHONE ENCOUNTER
Spoke to patient and she has a question in regards to starting the Contrave rx. States she recalls that Dr. Mays states that Contrave has Wellbutrin in it, and if she chose to start the Contrave she would need to discontinue the Wellbutrin. Is this correct? Please advise. Thanks.

## 2022-08-09 RX ORDER — TRAZODONE HYDROCHLORIDE 50 MG/1
50 TABLET ORAL NIGHTLY
Qty: 90 TABLET | Refills: 1 | Status: SHIPPED | OUTPATIENT
Start: 2022-08-09 | End: 2023-08-08

## 2022-08-09 RX ORDER — TRAZODONE HYDROCHLORIDE 50 MG/1
TABLET ORAL
Qty: 90 TABLET | Refills: 0 | OUTPATIENT
Start: 2022-08-09

## 2022-08-09 NOTE — TELEPHONE ENCOUNTER
----- Message from Marilee Stanley sent at 8/9/2022 12:42 PM CDT -----  Regarding: Call Back  Patient called requesting a call back as she'd had a missed call from Thursday. She previously called about some complications she was facing

## 2022-08-09 NOTE — TELEPHONE ENCOUNTER
Spoke to patient and states that she has been taking Trazodone along with 1/2 tablet of Clonazepam at night. Pt states that she ran out of the Trazodone about a week ago, and did not realize that the Trazodone was actually helping her sleep while taking the Clonazepam. Pt would like to speak with Dr. Mays about this. Pt was upset as she has been out of the medication for a week, and not sleeping. Pt attempted to contact the clinic all day yesterday, and unable to reach anyone. Please advise. Thanks.

## 2022-08-09 NOTE — TELEPHONE ENCOUNTER
Pt informed me at her last appointment that the trazodone is not working so it is to be discontinue and she continues on the clonipin. This was discuss at her recent appointment. That on the trazodone alone, she was not able to sleep, so we agreed that she would stay on the small dose of clonipin alone

## 2022-09-01 ENCOUNTER — TELEPHONE (OUTPATIENT)
Dept: PRIMARY CARE CLINIC | Facility: CLINIC | Age: 77
End: 2022-09-01
Payer: MEDICARE

## 2022-09-01 ENCOUNTER — OFFICE VISIT (OUTPATIENT)
Dept: UROLOGY | Facility: CLINIC | Age: 77
End: 2022-09-01
Payer: MEDICARE

## 2022-09-01 VITALS — WEIGHT: 152.13 LBS | RESPIRATION RATE: 18 BRPM | BODY MASS INDEX: 26.95 KG/M2 | HEIGHT: 63 IN

## 2022-09-01 DIAGNOSIS — R35.0 URINARY FREQUENCY: Primary | ICD-10-CM

## 2022-09-01 DIAGNOSIS — K59.00 CONSTIPATION, UNSPECIFIED CONSTIPATION TYPE: ICD-10-CM

## 2022-09-01 DIAGNOSIS — R39.15 URINARY URGENCY: ICD-10-CM

## 2022-09-01 DIAGNOSIS — N39.41 URGE INCONTINENCE: ICD-10-CM

## 2022-09-01 DIAGNOSIS — N39.3 STRESS INCONTINENCE: ICD-10-CM

## 2022-09-01 LAB
BILIRUB SERPL-MCNC: ABNORMAL MG/DL
BLOOD URINE, POC: ABNORMAL
CLARITY, POC UA: CLEAR
COLOR, POC UA: YELLOW
GLUCOSE UR QL STRIP: ABNORMAL
KETONES UR QL STRIP: ABNORMAL
LEUKOCYTE ESTERASE URINE, POC: ABNORMAL
NITRITE, POC UA: ABNORMAL
PH, POC UA: 5.5
PROTEIN, POC: ABNORMAL
SPECIFIC GRAVITY, POC UA: 1.01
UROBILINOGEN, POC UA: 0.2

## 2022-09-01 PROCEDURE — 1100F PTFALLS ASSESS-DOCD GE2>/YR: CPT | Mod: CPTII,S$GLB,, | Performed by: NURSE PRACTITIONER

## 2022-09-01 PROCEDURE — 1126F AMNT PAIN NOTED NONE PRSNT: CPT | Mod: CPTII,S$GLB,, | Performed by: NURSE PRACTITIONER

## 2022-09-01 PROCEDURE — 1160F PR REVIEW ALL MEDS BY PRESCRIBER/CLIN PHARMACIST DOCUMENTED: ICD-10-PCS | Mod: CPTII,S$GLB,, | Performed by: NURSE PRACTITIONER

## 2022-09-01 PROCEDURE — 99204 OFFICE O/P NEW MOD 45 MIN: CPT | Mod: 25,S$GLB,, | Performed by: NURSE PRACTITIONER

## 2022-09-01 PROCEDURE — 3288F PR FALLS RISK ASSESSMENT DOCUMENTED: ICD-10-PCS | Mod: CPTII,S$GLB,, | Performed by: NURSE PRACTITIONER

## 2022-09-01 PROCEDURE — 1126F PR PAIN SEVERITY QUANTIFIED, NO PAIN PRESENT: ICD-10-PCS | Mod: CPTII,S$GLB,, | Performed by: NURSE PRACTITIONER

## 2022-09-01 PROCEDURE — 81002 URINALYSIS NONAUTO W/O SCOPE: CPT | Mod: S$GLB,,, | Performed by: NURSE PRACTITIONER

## 2022-09-01 PROCEDURE — 1160F RVW MEDS BY RX/DR IN RCRD: CPT | Mod: CPTII,S$GLB,, | Performed by: NURSE PRACTITIONER

## 2022-09-01 PROCEDURE — 1100F PR PT FALLS ASSESS DOC 2+ FALLS/FALL W/INJURY/YR: ICD-10-PCS | Mod: CPTII,S$GLB,, | Performed by: NURSE PRACTITIONER

## 2022-09-01 PROCEDURE — 1159F MED LIST DOCD IN RCRD: CPT | Mod: CPTII,S$GLB,, | Performed by: NURSE PRACTITIONER

## 2022-09-01 PROCEDURE — 99204 PR OFFICE/OUTPT VISIT, NEW, LEVL IV, 45-59 MIN: ICD-10-PCS | Mod: 25,S$GLB,, | Performed by: NURSE PRACTITIONER

## 2022-09-01 PROCEDURE — 1159F PR MEDICATION LIST DOCUMENTED IN MEDICAL RECORD: ICD-10-PCS | Mod: CPTII,S$GLB,, | Performed by: NURSE PRACTITIONER

## 2022-09-01 PROCEDURE — 99999 PR PBB SHADOW E&M-EST. PATIENT-LVL IV: CPT | Mod: PBBFAC,,, | Performed by: NURSE PRACTITIONER

## 2022-09-01 PROCEDURE — 51701 INSERT BLADDER CATHETER: CPT | Mod: S$GLB,,, | Performed by: NURSE PRACTITIONER

## 2022-09-01 PROCEDURE — 99999 PR PBB SHADOW E&M-EST. PATIENT-LVL IV: ICD-10-PCS | Mod: PBBFAC,,, | Performed by: NURSE PRACTITIONER

## 2022-09-01 PROCEDURE — 81002 PR URINALYSIS NONAUTO W/O SCOPE: ICD-10-PCS | Mod: S$GLB,,, | Performed by: NURSE PRACTITIONER

## 2022-09-01 PROCEDURE — 3288F FALL RISK ASSESSMENT DOCD: CPT | Mod: CPTII,S$GLB,, | Performed by: NURSE PRACTITIONER

## 2022-09-01 PROCEDURE — 51701 PR INSERTION OF NON-INDWELLING BLADDER CATHETERIZATION FOR RESIDUAL UR: ICD-10-PCS | Mod: S$GLB,,, | Performed by: NURSE PRACTITIONER

## 2022-09-01 RX ORDER — MIRABEGRON 25 MG/1
25 TABLET, FILM COATED, EXTENDED RELEASE ORAL DAILY
Qty: 30 TABLET | Refills: 3 | Status: SHIPPED | OUTPATIENT
Start: 2022-09-01 | End: 2023-01-23

## 2022-09-01 NOTE — PROGRESS NOTES
CHIEF COMPLAINT:    Mrs Velasquez is a 76 y.o. female presenting for urinary incontinence.  PRESENTING ILLNESS:    Madisyn Velasquez is a 76 y.o. female with a PMH of left frontal SAH, anxiety, HTN who presents for urinary incontinence. This is her initial clinic visit.    Today 9/1/22 patient presents to clinic for urinary incontinence for the past 5 years. She reports it initially started as AURELIA with laughing, coughing, sneezing. It has progressed to dribbling of urine trying to get to the bathroom in time. Last week, she had an episode of full incontinence after urge occurred. She is now interested in OAB medication for symptoms.  Pt reports she voids every 2-3 hours at least. Once she feels urge to void, she finds it difficult to hold and occasionally has UUI. She wears pads, ~ 3 per day. She continues to have AURELIA. Denies dysuria, gross hematuria, flank pain, fever, chills, nausea or vomiting. She feels she empties her bladder.    Drinks: tea, coffee in AM, and 1 bottle of water per day  Constipation: yes, takes laxatives and eats prunes as needed    History of kidney stones: none  History of recurrent UTI: none  Personal or family hx of  malignancy: none  History of  trauma: none  Smoking history: never smoked    Urine cultures:   No results found for: LABURIN      REVIEW OF SYSTEMS:    Review of Systems    Constitutional: Negative for fever and chills.   HENT: Negative for hearing loss.   Eyes: Negative for visual disturbance.   Respiratory: Negative for shortness of breath.   Cardiovascular: Negative for chest pain.   Gastrointestinal: Positive constipation. Negative for nausea, vomiting.   Genitourinary:  See above  Neurological: Positive for dizziness (baseline)  Hematological: Does not bruise/bleed easily.   Psychiatric/Behavioral: Negative for confusion.     PATIENT HISTORY:    Past Medical History:   Diagnosis Date    Anxiety     Constipation        Past Surgical History:   Procedure Laterality Date      SECTION      FRACTURE SURGERY      arm    HIP REPLACEMENT ARTHROPLASTY      REVISION OF TOTAL KNEE REPLACEMENT          History reviewed. No pertinent family history.    Social History     Socioeconomic History    Marital status:    Tobacco Use    Smoking status: Never    Smokeless tobacco: Never   Substance and Sexual Activity    Alcohol use: Yes     Alcohol/week: 2.0 standard drinks     Types: 2 Glasses of wine per week    Drug use: Never       Allergies:  Patient has no known allergies.    Medications:    Current Outpatient Medications:     buPROPion (WELLBUTRIN) 100 MG tablet, Take 1 tablet (100 mg total) by mouth 2 (two) times daily., Disp: 90 tablet, Rfl: 0    clonazePAM (KLONOPIN) 0.5 MG tablet, Take 1 tablet (0.5 mg total) by mouth every evening., Disp: 30 tablet, Rfl: 0    ELIQUIS 5 mg Tab, Take 5 mg by mouth 2 (two) times daily., Disp: , Rfl:     EScitalopram oxalate (LEXAPRO) 20 MG tablet, Take 1 tablet (20 mg total) by mouth once daily., Disp: 30 tablet, Rfl: 11    rosuvastatin (CRESTOR) 10 MG tablet, Take 1 tablet (10 mg total) by mouth once daily., Disp: 30 tablet, Rfl: 3    sotaloL (BETAPACE) 80 MG tablet, Take 40 mg by mouth 2 (two) times daily., Disp: , Rfl:     traZODone (DESYREL) 50 MG tablet, Take 1 tablet (50 mg total) by mouth every evening., Disp: 90 tablet, Rfl: 1    mirabegron (MYRBETRIQ) 25 mg Tb24 ER tablet, Take 1 tablet (25 mg total) by mouth once daily., Disp: 30 tablet, Rfl: 3    naltrexone-bupropion (CONTRAVE) 8-90 mg TbSR, Take 1 tablet by mouth once daily at 6am. (Patient not taking: Reported on 2022), Disp: 90 tablet, Rfl: 0    PHYSICAL EXAMINATION:    Constitutional: She is oriented to person, place, and time. She appears well-developed and well-nourished.  She is in no apparent distress.    Neck: Normal ROM.     Cardiovascular: Normal rate.      Pulmonary/Chest: Effort normal. No respiratory distress.     Abdominal:  She exhibits no distension.  There is no  CVA tenderness.     Neurological: She is alert and oriented to person, place, and time.     Skin: Skin is warm and dry.     Psych: Cooperative with normal affect.    Genitourinary:  Normal external female genitalia  Urethral meatus is normal  Mild pelvic organ prolapse    Consent verbally obtained.  Betadine prep was applied to the urethral meatus. An in and out cath was performed after voiding.  The PVR was 60 ml.      Physical Exam      LABS:    U/a: sp grav 1.010, pH 5.5, trace ketones, otherwise negative    Lab Results   Component Value Date    CREATININE 0.6 01/25/2022       IMPRESSION:    Encounter Diagnoses   Name Primary?    Urinary frequency Yes    Urinary urgency     Urge incontinence     Stress incontinence     Constipation, unspecified constipation type        PLAN:  -Discussed OAB with patient. Conservative measures reviewed with patient.  Pt is interested in OAB medication. Will avoid anticholinergics d/t constipation and dizziness as baseline.  Will start with Myrbetriq.  Discussed side effects, indications, and MOA for myrbetriq. Prescription sent to the pharmacy. Pt verbalized understanding. Monitor BP while starting medication.  If too expensive, will then try trospium but patient will have to closely monitor constipation.  -RTC 8 weeks    I encouraged her or any of her family members to call or email me with questions and/or concerns.      45 minutes of total time spent on the encounter, which includes face to face time and non-face to face time preparing to see the patient (eg, review of tests), Obtaining and/or reviewing separately obtained history, Documenting clinical information in the electronic or other health record, Independently interpreting results (not separately reported) and communicating results to the patient/family/caregiver, or Care coordination (not separately reported).

## 2022-09-01 NOTE — TELEPHONE ENCOUNTER
She can certainly schedule for a cognitive testing with me. And if something if abnormal on testing, I can then refer to neurology or neuropsych or any other appropriate service.

## 2022-09-01 NOTE — PATIENT INSTRUCTIONS
Discussed conservative measures to control urgency and frequency including   1. Avoiding/minimizing bladder irritants (see below), especially in afternoon and evening hours    Discussed bladder irritants include coffe (even decaf), tea, alcohol, soda, spicy foods, acidic juices (orange, tomato), vinegar, and artificial sweeteners/sugary beverages.    2. timed voiding - empty on a schedule (approx ~2-3 hours) in spite of need to urinate, to get ahead of urge    3. dont postponing voiding - dont hold it on purpose     4. bowel regimen as distended bowel has extrinsic compressive effect on bladder.   - any or all of the following in any combination, titrate to soft daily bowel movement without pushing or straining  - colace/stool softener capsule - once to twice daily  - miralax - 1 capful daily to start, can increase to 2x daily (or decrease to 1/2 cap daily)  - increase dietary fibery  - fiber supplements, such as metamucil  - prunes, prune juice    5. INCREASE water intake    6. Stop fluids 2 hours before bed, and urinate just before bed

## 2022-09-01 NOTE — TELEPHONE ENCOUNTER
----- Message from Rian Washington, Patient Care Assistant sent at 9/1/2022  9:36 AM CDT -----  Contact: Pt  Type: Needs Medical Advice    Who Called: Pt   Best Call Back Number: [phone]  Inquiry/Question: Pt is calling to get orders for memory testing. Pt states she is having some memory loss and wanted to get help before it gets out of hand. Please call back and advise. Thank you~

## 2022-09-02 NOTE — TELEPHONE ENCOUNTER
Spoke to patient and scheduled her to see Dr. Mays next week for memory testing. Verbalized understanding.

## 2022-09-08 ENCOUNTER — OFFICE VISIT (OUTPATIENT)
Dept: PRIMARY CARE CLINIC | Facility: CLINIC | Age: 77
End: 2022-09-08
Payer: MEDICARE

## 2022-09-08 VITALS
HEART RATE: 65 BPM | OXYGEN SATURATION: 97 % | TEMPERATURE: 98 F | BODY MASS INDEX: 27.24 KG/M2 | DIASTOLIC BLOOD PRESSURE: 76 MMHG | SYSTOLIC BLOOD PRESSURE: 140 MMHG | HEIGHT: 63 IN | WEIGHT: 153.75 LBS

## 2022-09-08 DIAGNOSIS — R68.89 FORGETFULNESS: Primary | ICD-10-CM

## 2022-09-08 DIAGNOSIS — D69.2 SENILE PURPURA: ICD-10-CM

## 2022-09-08 DIAGNOSIS — F51.01 PRIMARY INSOMNIA: ICD-10-CM

## 2022-09-08 PROCEDURE — 99499 UNLISTED E&M SERVICE: CPT | Mod: HCNC,S$GLB,, | Performed by: INTERNAL MEDICINE

## 2022-09-08 PROCEDURE — 3077F SYST BP >= 140 MM HG: CPT | Mod: CPTII,S$GLB,, | Performed by: INTERNAL MEDICINE

## 2022-09-08 PROCEDURE — 1160F RVW MEDS BY RX/DR IN RCRD: CPT | Mod: CPTII,S$GLB,, | Performed by: INTERNAL MEDICINE

## 2022-09-08 PROCEDURE — 3077F PR MOST RECENT SYSTOLIC BLOOD PRESSURE >= 140 MM HG: ICD-10-PCS | Mod: CPTII,S$GLB,, | Performed by: INTERNAL MEDICINE

## 2022-09-08 PROCEDURE — 1159F PR MEDICATION LIST DOCUMENTED IN MEDICAL RECORD: ICD-10-PCS | Mod: CPTII,S$GLB,, | Performed by: INTERNAL MEDICINE

## 2022-09-08 PROCEDURE — 1101F PR PT FALLS ASSESS DOC 0-1 FALLS W/OUT INJ PAST YR: ICD-10-PCS | Mod: CPTII,S$GLB,, | Performed by: INTERNAL MEDICINE

## 2022-09-08 PROCEDURE — 1101F PT FALLS ASSESS-DOCD LE1/YR: CPT | Mod: CPTII,S$GLB,, | Performed by: INTERNAL MEDICINE

## 2022-09-08 PROCEDURE — 99214 OFFICE O/P EST MOD 30 MIN: CPT | Mod: S$GLB,,, | Performed by: INTERNAL MEDICINE

## 2022-09-08 PROCEDURE — 1126F AMNT PAIN NOTED NONE PRSNT: CPT | Mod: CPTII,S$GLB,, | Performed by: INTERNAL MEDICINE

## 2022-09-08 PROCEDURE — 3078F PR MOST RECENT DIASTOLIC BLOOD PRESSURE < 80 MM HG: ICD-10-PCS | Mod: CPTII,S$GLB,, | Performed by: INTERNAL MEDICINE

## 2022-09-08 PROCEDURE — 3288F FALL RISK ASSESSMENT DOCD: CPT | Mod: CPTII,S$GLB,, | Performed by: INTERNAL MEDICINE

## 2022-09-08 PROCEDURE — 99999 PR PBB SHADOW E&M-EST. PATIENT-LVL III: CPT | Mod: PBBFAC,,, | Performed by: INTERNAL MEDICINE

## 2022-09-08 PROCEDURE — 3078F DIAST BP <80 MM HG: CPT | Mod: CPTII,S$GLB,, | Performed by: INTERNAL MEDICINE

## 2022-09-08 PROCEDURE — 99214 PR OFFICE/OUTPT VISIT, EST, LEVL IV, 30-39 MIN: ICD-10-PCS | Mod: S$GLB,,, | Performed by: INTERNAL MEDICINE

## 2022-09-08 PROCEDURE — 99499 RISK ADDL DX/OHS AUDIT: ICD-10-PCS | Mod: HCNC,S$GLB,, | Performed by: INTERNAL MEDICINE

## 2022-09-08 PROCEDURE — 1160F PR REVIEW ALL MEDS BY PRESCRIBER/CLIN PHARMACIST DOCUMENTED: ICD-10-PCS | Mod: CPTII,S$GLB,, | Performed by: INTERNAL MEDICINE

## 2022-09-08 PROCEDURE — 99999 PR PBB SHADOW E&M-EST. PATIENT-LVL III: ICD-10-PCS | Mod: PBBFAC,,, | Performed by: INTERNAL MEDICINE

## 2022-09-08 PROCEDURE — 1159F MED LIST DOCD IN RCRD: CPT | Mod: CPTII,S$GLB,, | Performed by: INTERNAL MEDICINE

## 2022-09-08 PROCEDURE — 1126F PR PAIN SEVERITY QUANTIFIED, NO PAIN PRESENT: ICD-10-PCS | Mod: CPTII,S$GLB,, | Performed by: INTERNAL MEDICINE

## 2022-09-08 PROCEDURE — 3288F PR FALLS RISK ASSESSMENT DOCUMENTED: ICD-10-PCS | Mod: CPTII,S$GLB,, | Performed by: INTERNAL MEDICINE

## 2022-09-08 NOTE — PROGRESS NOTES
INTERNAL MEDICINE PROGRESS/URGENT CARE NOTE    CHIEF COMPLAINT     Chief Complaint   Patient presents with    memory test       HPI     Madisyn Velasquez is a 76 y.o.  female who presents for an urgent/follow up visit today.  Patients main concern is her chronic forgetfulness. Noted with issues with medication.   Has a lot on her plate and constantly multitasking.   Her  thinks her hearing is not good but she thinks its selective hearing loss  TSH and vitamin B12 labs were ordered at her last visit but not done.   Specifically describes that when she goies into a room ofetn forget when she went in there for. Takes a fruit for her  but then forgets where she put it. Now having issues with major ADLs and iADLs such as bill paying or driving but with minor daily functions.   She does do a pill box. Etc     Insomnia: patient now takes clopinin (was on this prior to my meeting her ut was on 1mg now on only 1/2 taht dose) which is great that weve been able to wean. But she was unsuccessful at weaning lower. Also took the trazodone and christie th makes her sleep ebtter. We tried with one each and she could not fall asleep or stay asleep.     Past Medical History:  Past Medical History:   Diagnosis Date    Anxiety     Constipation        Home Medications:  Prior to Admission medications    Medication Sig Start Date End Date Taking? Authorizing Provider   buPROPion (WELLBUTRIN) 100 MG tablet Take 1 tablet (100 mg total) by mouth 2 (two) times daily. 8/3/22  Yes Ela Mays MD   clonazePAM (KLONOPIN) 0.5 MG tablet Take 1 tablet (0.5 mg total) by mouth every evening. 8/3/22  Yes Ela Mays MD   ELIQUIS 5 mg Tab Take 5 mg by mouth 2 (two) times daily. 3/8/22  Yes Historical Provider   EScitalopram oxalate (LEXAPRO) 20 MG tablet Take 1 tablet (20 mg total) by mouth once daily. 6/23/22  Yes Ela Mays MD   mirabegron (MYRBETRIQ) 25 mg Tb24 ER tablet Take 1 tablet (25 mg total) by mouth once  "daily. 9/1/22  Yes Diane Cantor, [facility]   rosuvastatin (CRESTOR) 10 MG tablet Take 1 tablet (10 mg total) by mouth once daily. 6/7/22  Yes Ela Mays MD   sotaloL (BETAPACE) 80 MG tablet Take 40 mg by mouth 2 (two) times daily.   Yes Historical Provider   traZODone (DESYREL) 50 MG tablet Take 1 tablet (50 mg total) by mouth every evening. 8/9/22 8/9/23 Yes Ela Mays MD   naltrexone-bupropion (CONTRAVE) 8-90 mg TbSR Take 1 tablet by mouth once daily at 6am.  Patient not taking: No sig reported 8/3/22 9/8/22  Ela Mays MD       Review of Systems:  Review of Systems  See HPI.     PHYSICAL EXAM     BP (!) 140/76 (BP Location: Right arm, Patient Position: Sitting, BP Method: Large (Manual))   Pulse 65   Temp 98.2 °F (36.8 °C)   Ht 5' 3" (1.6 m)   Wt 69.7 kg (153 lb 12.3 oz)   SpO2 97%   BMI 27.24 kg/m²     GEN - A+OX4, NAD   HEENT - PERRL, EOMI, OP clear  Neck - No thyromegaly or cervical LAD. No thyroid masses felt.  CV - RRR, no m/r   Chest - CTAB, no wheezing or rhonchi  Skin - No rash.    LABS   Labs have been reviewed.     ASSESSMENT/PLAN     Madisyn Velasquez is a 76 y.o. female with  Madisyn was seen today for memory test.    Diagnoses and all orders for this visit:    Forgetfulness  MOCA today 25/30. Potentially distacted with her responsibilities  Ordered vitamin B12 and TSH labs, pt to get them done  Advised proper diet and daily exercise . She is very active but does not exercise much   Advised to keep lists and write everything down.     Primary insomnia  Tried to wean down on the clonipin but only got down to 0.5mg from 1mg when I met here. Also needs the trazodone to help with this. Neither medication works well on their own, but the combination does help her sleep. Unable to get any sleep without both, per patient. Tried melatonin with no effect.   Advised proper sleep hygiene.     Senile purpura  -benign skin condition. Often caused by the thin skin associated with aging. "   -counseled patient on proper skin protection and moisturization and avoidance of skin trauma       WORRY SCORE 2    RTC in 6 months, sooner if needed and depending on labs.    Ela Mays MD  Board Certified Internist/Geriatrician  [facility] ([place])

## 2022-09-26 ENCOUNTER — IMMUNIZATION (OUTPATIENT)
Dept: PRIMARY CARE CLINIC | Facility: CLINIC | Age: 77
End: 2022-09-26
Payer: MEDICARE

## 2022-09-26 DIAGNOSIS — Z23 NEED FOR VACCINATION: Primary | ICD-10-CM

## 2022-09-26 PROCEDURE — 91312 COVID-19, MRNA, LNP-S, BIVALENT BOOSTER, PF, 30 MCG/0.3 ML DOSE: ICD-10-PCS | Mod: S$GLB,,, | Performed by: FAMILY MEDICINE

## 2022-09-26 PROCEDURE — 0124A COVID-19, MRNA, LNP-S, BIVALENT BOOSTER, PF, 30 MCG/0.3 ML DOSE: ICD-10-PCS | Mod: S$GLB,,, | Performed by: FAMILY MEDICINE

## 2022-09-26 PROCEDURE — 91312 COVID-19, MRNA, LNP-S, BIVALENT BOOSTER, PF, 30 MCG/0.3 ML DOSE: CPT | Mod: S$GLB,,, | Performed by: FAMILY MEDICINE

## 2022-09-26 PROCEDURE — 0124A COVID-19, MRNA, LNP-S, BIVALENT BOOSTER, PF, 30 MCG/0.3 ML DOSE: CPT | Mod: S$GLB,,, | Performed by: FAMILY MEDICINE

## 2022-09-29 ENCOUNTER — TELEPHONE (OUTPATIENT)
Dept: PRIMARY CARE CLINIC | Facility: CLINIC | Age: 77
End: 2022-09-29

## 2022-09-29 NOTE — TELEPHONE ENCOUNTER
----- Message from Arlette Benjamin sent at 9/29/2022  9:10 AM CDT -----  I have a patient that is calling to speak with doctor about a dog that she has found. She stated that this was discussed at her last visit and was wondering if she could have the doctor call her back in regards to this.       Phone number- [phone] (home)

## 2022-09-29 NOTE — TELEPHONE ENCOUNTER
Spk to pt, states Dr Mays discussed at previous visit about finding her a dog. Pt called to inform that she has a dog for Dr Mays.    Please call pt

## 2022-10-24 ENCOUNTER — TELEPHONE (OUTPATIENT)
Dept: PRIMARY CARE CLINIC | Facility: CLINIC | Age: 77
End: 2022-10-24
Payer: MEDICARE

## 2022-10-24 NOTE — TELEPHONE ENCOUNTER
Pt unable to come in due to her  being on hospice. Virtual appt scheduled for Friday 10/28 @11 am.

## 2022-10-24 NOTE — TELEPHONE ENCOUNTER
Pt states she is requesting increase in Wellbutrin. She would prefer the extended dosage 120 mg if possible. States she has noted she is sleeping more and is a daily struggle. States she is currently caring for her  whom is on hospice and everything is starting be a little overwhelming making daily chores difficult. States she is sleeping nightly for about 10 hours if not more and still naps during the day.

## 2022-10-24 NOTE — TELEPHONE ENCOUNTER
----- Message from Wilma Kirk sent at 10/24/2022 12:03 PM CDT -----  Contact: Pt  Type: Needs Medical Advice    Who Called:Pt  Best Call Back Number:[phone]    Additional Information Requesting a call back regarding Pt was calling in regards to there medication for the buPROPion (WELLBUTRIN) 100 MG tablet and EScitalopram oxalate (LEXAPRO) 20 MG tablet pt stated the medication is not managing there symptoms too well pt wanted to speak with office please call when available Thank you  Please Advise-Thank you

## 2022-10-24 NOTE — TELEPHONE ENCOUNTER
I think im confused. Shes on medication to help her sleep, but shes concerned that shes sleeping too much?   Would she mind coming in to discuss whats going on with her? I need to be clear and also see how we may be able to adjust her medications best and I will certainly increased the well butrin to help her deal with all this but just want to make sure I have my information straight so we are doing the best with these medications.

## 2022-10-28 DIAGNOSIS — F51.01 PRIMARY INSOMNIA: ICD-10-CM

## 2022-10-28 DIAGNOSIS — G47.00 INSOMNIA, UNSPECIFIED TYPE: Primary | ICD-10-CM

## 2022-10-28 DIAGNOSIS — F33.1 MODERATE EPISODE OF RECURRENT MAJOR DEPRESSIVE DISORDER: Primary | ICD-10-CM

## 2022-10-28 DIAGNOSIS — F33.1 MODERATE EPISODE OF RECURRENT MAJOR DEPRESSIVE DISORDER: ICD-10-CM

## 2022-10-28 RX ORDER — BUPROPION HYDROCHLORIDE 150 MG/1
150 TABLET, EXTENDED RELEASE ORAL 2 TIMES DAILY
Qty: 180 TABLET | Refills: 0 | Status: SHIPPED | OUTPATIENT
Start: 2022-10-28 | End: 2023-08-08

## 2022-10-28 RX ORDER — BUPROPION HYDROCHLORIDE 150 MG/1
150 TABLET ORAL DAILY
Qty: 90 TABLET | Refills: 1 | Status: SHIPPED | OUTPATIENT
Start: 2022-10-28 | End: 2022-10-28

## 2022-11-07 ENCOUNTER — TELEPHONE (OUTPATIENT)
Dept: PRIMARY CARE CLINIC | Facility: CLINIC | Age: 77
End: 2022-11-07
Payer: MEDICARE

## 2022-11-07 NOTE — TELEPHONE ENCOUNTER
----- Message from April Julien sent at 11/7/2022  3:08 PM CST -----  Regarding: referral psychiatry  Pt called the clinic, and wanted to schedule her appt with Psychiatry. Referral is ready for scheduling. Can someone please call this pt to make an appointment, her contacts is [phone]. Thank you

## 2022-11-10 ENCOUNTER — TELEPHONE (OUTPATIENT)
Dept: PSYCHIATRY | Facility: CLINIC | Age: 77
End: 2022-11-10
Payer: MEDICARE

## 2022-11-10 ENCOUNTER — TELEPHONE (OUTPATIENT)
Dept: PRIMARY CARE CLINIC | Facility: CLINIC | Age: 77
End: 2022-11-10

## 2022-11-10 NOTE — TELEPHONE ENCOUNTER
----- Message from Serene Kirk sent at 11/9/2022  2:30 PM CST -----  Regarding: Voicemail  Called for np appointment

## 2022-11-14 RX ORDER — CLONAZEPAM 0.5 MG/1
0.5 TABLET ORAL NIGHTLY
Qty: 5 TABLET | Refills: 0 | Status: SHIPPED | OUTPATIENT
Start: 2022-11-14 | End: 2023-06-12

## 2022-11-25 DIAGNOSIS — Z78.0 MENOPAUSE: Primary | ICD-10-CM

## 2022-11-28 ENCOUNTER — TELEPHONE (OUTPATIENT)
Dept: PRIMARY CARE CLINIC | Facility: CLINIC | Age: 77
End: 2022-11-28
Payer: MEDICARE

## 2022-12-05 ENCOUNTER — HOSPITAL ENCOUNTER (OUTPATIENT)
Dept: RADIOLOGY | Facility: CLINIC | Age: 77
Discharge: HOME | End: 2022-12-05
Attending: INTERNAL MEDICINE
Payer: MEDICARE

## 2022-12-05 DIAGNOSIS — Z78.0 MENOPAUSE: ICD-10-CM

## 2022-12-05 PROCEDURE — 77080 DXA BONE DENSITY AXIAL: CPT | Mod: TC,PO

## 2022-12-05 PROCEDURE — 77080 DXA BONE DENSITY AXIAL: CPT | Mod: 26,,, | Performed by: RADIOLOGY

## 2022-12-05 PROCEDURE — 77080 DEXA BONE DENSITY SPINE HIP: ICD-10-PCS | Mod: 26,,, | Performed by: RADIOLOGY

## 2022-12-16 ENCOUNTER — TELEPHONE (OUTPATIENT)
Dept: PRIMARY CARE CLINIC | Facility: CLINIC | Age: 77
End: 2022-12-16
Payer: MEDICARE

## 2023-01-23 DIAGNOSIS — N39.3 STRESS INCONTINENCE: ICD-10-CM

## 2023-01-23 DIAGNOSIS — R39.15 URINARY URGENCY: ICD-10-CM

## 2023-01-23 DIAGNOSIS — N39.41 URGE INCONTINENCE: ICD-10-CM

## 2023-01-23 DIAGNOSIS — K59.00 CONSTIPATION, UNSPECIFIED CONSTIPATION TYPE: ICD-10-CM

## 2023-01-23 DIAGNOSIS — R35.0 URINARY FREQUENCY: ICD-10-CM

## 2023-01-23 RX ORDER — MIRABEGRON 25 MG/1
25 TABLET, FILM COATED, EXTENDED RELEASE ORAL DAILY
Qty: 30 TABLET | Refills: 0 | Status: SHIPPED | OUTPATIENT
Start: 2023-01-23 | End: 2023-06-12

## 2023-01-23 NOTE — TELEPHONE ENCOUNTER
----- Message from Diane Cantor NP sent at 1/23/2023  3:43 PM CST -----  Regarding: appt  I refilled meds for 30 days. She will need f/u appt for additional refills    thanks

## 2023-02-07 ENCOUNTER — LAB VISIT (OUTPATIENT)
Dept: LAB | Facility: HOSPITAL | Age: 78
End: 2023-02-07
Attending: INTERNAL MEDICINE
Payer: MEDICARE

## 2023-02-07 DIAGNOSIS — I10 PRIMARY HYPERTENSION: ICD-10-CM

## 2023-02-07 DIAGNOSIS — Z11.59 NEED FOR HEPATITIS C SCREENING TEST: ICD-10-CM

## 2023-02-07 DIAGNOSIS — F33.1 MODERATE EPISODE OF RECURRENT MAJOR DEPRESSIVE DISORDER: ICD-10-CM

## 2023-02-07 DIAGNOSIS — E53.8 DEFICIENCY OF OTHER SPECIFIED B GROUP VITAMINS: ICD-10-CM

## 2023-02-07 LAB
ANION GAP SERPL CALC-SCNC: 6 MMOL/L (ref 8–16)
BUN SERPL-MCNC: 11 MG/DL (ref 8–23)
CALCIUM SERPL-MCNC: 9.2 MG/DL (ref 8.7–10.5)
CHLORIDE SERPL-SCNC: 103 MMOL/L (ref 95–110)
CO2 SERPL-SCNC: 28 MMOL/L (ref 23–29)
CREAT SERPL-MCNC: 0.8 MG/DL (ref 0.5–1.4)
EST. GFR  (NO RACE VARIABLE): >60 ML/MIN/1.73 M^2
GLUCOSE SERPL-MCNC: 105 MG/DL (ref 70–110)
POTASSIUM SERPL-SCNC: 4.8 MMOL/L (ref 3.5–5.1)
SODIUM SERPL-SCNC: 137 MMOL/L (ref 136–145)
TSH SERPL DL<=0.005 MIU/L-ACNC: 2.83 UIU/ML (ref 0.4–4)
VIT B12 SERPL-MCNC: 192 PG/ML (ref 210–950)

## 2023-02-07 PROCEDURE — 36415 COLL VENOUS BLD VENIPUNCTURE: CPT | Mod: HCNC | Performed by: INTERNAL MEDICINE

## 2023-02-07 PROCEDURE — 87522 HEPATITIS C REVRS TRNSCRPJ: CPT | Mod: HCNC | Performed by: INTERNAL MEDICINE

## 2023-02-07 PROCEDURE — 84443 ASSAY THYROID STIM HORMONE: CPT | Mod: HCNC | Performed by: INTERNAL MEDICINE

## 2023-02-07 PROCEDURE — 82607 VITAMIN B-12: CPT | Mod: HCNC | Performed by: INTERNAL MEDICINE

## 2023-02-07 PROCEDURE — 80048 BASIC METABOLIC PNL TOTAL CA: CPT | Mod: HCNC | Performed by: INTERNAL MEDICINE

## 2023-02-08 ENCOUNTER — TELEPHONE (OUTPATIENT)
Dept: PRIMARY CARE CLINIC | Facility: CLINIC | Age: 78
End: 2023-02-08
Payer: MEDICARE

## 2023-02-08 LAB
HCV RNA SERPL QL NAA+PROBE: NOT DETECTED
HCV RNA SPEC NAA+PROBE-ACNC: NOT DETECTED IU/ML

## 2023-02-08 NOTE — TELEPHONE ENCOUNTER
Please call and notify pt that Dr. Mays is out of the office at this time but I got a chance to review her labs. Her B12 is low, which can affect the forgetfulness. I want her to be scheduled for a nurse's visit for B12 injection before she even follows up w/ Dr. Mays if that's ok w/ her. Kidney function and thyroid function are normal. Dr. Mays may continue B12 injections at the follow up visit but I will leave that to her discretion.

## 2023-02-09 DIAGNOSIS — Z00.00 ENCOUNTER FOR MEDICARE ANNUAL WELLNESS EXAM: ICD-10-CM

## 2023-02-09 NOTE — TELEPHONE ENCOUNTER
Spoke with pt, she stated that she is coming in to see Dr. Mays. She may wait until 2/17/2023.     Explained to pt if she changes her mind, she can come for the B-12 injection next week.   Pt verbalized understanding.

## 2023-02-09 NOTE — TELEPHONE ENCOUNTER
Spoke with pt and schedule hospital follow up with CT scan prior to. Pt voiced understanding to appt details.    Hpi Title: Evaluation of Skin Lesions How Severe Are Your Spot(S)?: mild Have Your Spot(S) Been Treated In The Past?: has not been treated

## 2023-02-15 NOTE — PROGRESS NOTES
Labs reviewed. Needs to be on vitamin b12 1000mcg daily. Will discuss further at upcoming appointment

## 2023-03-06 ENCOUNTER — OFFICE VISIT (OUTPATIENT)
Dept: PRIMARY CARE CLINIC | Facility: CLINIC | Age: 78
End: 2023-03-06
Payer: MEDICARE

## 2023-03-06 VITALS
SYSTOLIC BLOOD PRESSURE: 132 MMHG | WEIGHT: 151.38 LBS | DIASTOLIC BLOOD PRESSURE: 78 MMHG | RESPIRATION RATE: 20 BRPM | HEIGHT: 63 IN | TEMPERATURE: 98 F | OXYGEN SATURATION: 98 % | HEART RATE: 81 BPM | BODY MASS INDEX: 26.82 KG/M2

## 2023-03-06 DIAGNOSIS — E53.8 VITAMIN B12 DEFICIENCY: ICD-10-CM

## 2023-03-06 DIAGNOSIS — Z00.00 ENCOUNTER FOR ANNUAL WELLNESS EXAM IN MEDICARE PATIENT: Primary | ICD-10-CM

## 2023-03-06 DIAGNOSIS — Z00.00 ENCOUNTER FOR MEDICARE ANNUAL WELLNESS EXAM: ICD-10-CM

## 2023-03-06 PROCEDURE — 3078F PR MOST RECENT DIASTOLIC BLOOD PRESSURE < 80 MM HG: ICD-10-PCS | Mod: HCNC,CPTII,S$GLB, | Performed by: INTERNAL MEDICINE

## 2023-03-06 PROCEDURE — 1160F RVW MEDS BY RX/DR IN RCRD: CPT | Mod: HCNC,CPTII,S$GLB, | Performed by: INTERNAL MEDICINE

## 2023-03-06 PROCEDURE — 99397 PR PREVENTIVE VISIT,EST,65 & OVER: ICD-10-PCS | Mod: HCNC,25,S$GLB, | Performed by: INTERNAL MEDICINE

## 2023-03-06 PROCEDURE — 1125F PR PAIN SEVERITY QUANTIFIED, PAIN PRESENT: ICD-10-PCS | Mod: HCNC,CPTII,S$GLB, | Performed by: INTERNAL MEDICINE

## 2023-03-06 PROCEDURE — 96372 PR INJECTION,THERAP/PROPH/DIAG2ST, IM OR SUBCUT: ICD-10-PCS | Mod: HCNC,S$GLB,, | Performed by: INTERNAL MEDICINE

## 2023-03-06 PROCEDURE — 1160F PR REVIEW ALL MEDS BY PRESCRIBER/CLIN PHARMACIST DOCUMENTED: ICD-10-PCS | Mod: HCNC,CPTII,S$GLB, | Performed by: INTERNAL MEDICINE

## 2023-03-06 PROCEDURE — 1159F MED LIST DOCD IN RCRD: CPT | Mod: HCNC,CPTII,S$GLB, | Performed by: INTERNAL MEDICINE

## 2023-03-06 PROCEDURE — 99397 PER PM REEVAL EST PAT 65+ YR: CPT | Mod: HCNC,25,S$GLB, | Performed by: INTERNAL MEDICINE

## 2023-03-06 PROCEDURE — 96372 THER/PROPH/DIAG INJ SC/IM: CPT | Mod: HCNC,S$GLB,, | Performed by: INTERNAL MEDICINE

## 2023-03-06 PROCEDURE — 3075F SYST BP GE 130 - 139MM HG: CPT | Mod: HCNC,CPTII,S$GLB, | Performed by: INTERNAL MEDICINE

## 2023-03-06 PROCEDURE — 3075F PR MOST RECENT SYSTOLIC BLOOD PRESS GE 130-139MM HG: ICD-10-PCS | Mod: HCNC,CPTII,S$GLB, | Performed by: INTERNAL MEDICINE

## 2023-03-06 PROCEDURE — 1125F AMNT PAIN NOTED PAIN PRSNT: CPT | Mod: HCNC,CPTII,S$GLB, | Performed by: INTERNAL MEDICINE

## 2023-03-06 PROCEDURE — 99999 PR PBB SHADOW E&M-EST. PATIENT-LVL IV: CPT | Mod: PBBFAC,HCNC,, | Performed by: INTERNAL MEDICINE

## 2023-03-06 PROCEDURE — 3078F DIAST BP <80 MM HG: CPT | Mod: HCNC,CPTII,S$GLB, | Performed by: INTERNAL MEDICINE

## 2023-03-06 PROCEDURE — 1159F PR MEDICATION LIST DOCUMENTED IN MEDICAL RECORD: ICD-10-PCS | Mod: HCNC,CPTII,S$GLB, | Performed by: INTERNAL MEDICINE

## 2023-03-06 PROCEDURE — 99999 PR PBB SHADOW E&M-EST. PATIENT-LVL IV: ICD-10-PCS | Mod: PBBFAC,HCNC,, | Performed by: INTERNAL MEDICINE

## 2023-03-06 RX ORDER — CYANOCOBALAMIN 1000 UG/ML
1000 INJECTION, SOLUTION INTRAMUSCULAR; SUBCUTANEOUS
Status: SHIPPED | OUTPATIENT
Start: 2023-03-06 | End: 2023-06-04

## 2023-03-06 RX ADMIN — CYANOCOBALAMIN 1000 MCG: 1000 INJECTION, SOLUTION INTRAMUSCULAR; SUBCUTANEOUS at 02:03

## 2023-03-06 NOTE — PROGRESS NOTES
"  Madisyn Velasquez presented for a follow-up Medicare AWV today. The following components were reviewed and updated:    Medical history  Family History  Social history  Allergies and Current Medications  Health Risk Assessment  Health Maintenance  Care Team    See Completed Assessments for Annual Wellness visit with in the encounter summary    The following assessments were completed:  Depression Screening: with depression. PHQ 9 today was 3/27. Mild and controlled. Sees psychiatry and the combination of medications has stablizied her mood.   Cognitive function Screening 4/5, unable to properly number the clock.   Timed Get Up Test. 10 secs.   Whisper Test: normal via audiometry machine.     Vitals:    03/06/23 1319 03/06/23 1353   BP:  132/78   Pulse: 81    Resp: 20    Temp: 98 °F (36.7 °C)    TempSrc: Oral    SpO2: 98%    Weight: 68.6 kg (151 lb 5.5 oz)    Height: 5' 3" (1.6 m)      Body mass index is 26.81 kg/m².   ]        Diagnoses and health risks identified today and associated recommendations/orders:  1. Encounter for annual wellness exam in Medicare patient  -medications reviewed and consolidated  -reviewed PMH, medications, HCM including vaccinations.   -reviewed most recent lab work. Reordered as needed. Discussed abnormalities with patient with time allowed for questions.   -reviewed clinic policy with patient including to arrive 15 mins before appointments, labs and results including expected turn around for results.   -outside records and consultants notes reviewed as time allowed. Updated treatment team with name of other providers.   -reviewed and confirmed patients contact information, preferred pharmacy etc.   -AVS provided after visit to summarized things discussed.   -The following assessments were completed:  Living Situation  CAGE  Depression Screening  Timed Get Up and Go  Cognitive Function Screening-Minicog   Nutrition Screening  ADL Screening  Long discussion re goals of care, code status. " ACP. ACP completed today in the office.   - Full code    2. Vitamin B12 deficiency  - Prior authorization Order      Provided Madisyn with a 5-10 year written screening schedule and personal prevention plan. Recommendations were developed using the USPSTF age appropriate recommendations. Education, counseling, and referrals were provided as needed.  After Visit Summary printed and given to patient which includes a list of additional screenings\tests needed.    No follow-ups on file.      Ela Mays MD

## 2023-03-06 NOTE — PROGRESS NOTES
Identified pt by name and date of birth, discussed B-12, pt verbalized understanding. Administered to right arm, 3-4 fingerbreaths below shoulder, aseptic technique, 25g needle. Pt tolerated well and remained in clinic for 15 min

## 2023-03-09 PROBLEM — Z00.00 ENCOUNTER FOR MEDICARE ANNUAL WELLNESS EXAM: Status: ACTIVE | Noted: 2023-03-09

## 2023-06-05 PROBLEM — Z00.00 ENCOUNTER FOR ANNUAL WELLNESS EXAM IN MEDICARE PATIENT: Status: RESOLVED | Noted: 2023-03-06 | Resolved: 2023-06-05

## 2023-06-09 ENCOUNTER — TELEPHONE (OUTPATIENT)
Dept: PRIMARY CARE CLINIC | Facility: CLINIC | Age: 78
End: 2023-06-09
Payer: MEDICARE

## 2023-06-12 ENCOUNTER — OFFICE VISIT (OUTPATIENT)
Dept: PRIMARY CARE CLINIC | Facility: CLINIC | Age: 78
End: 2023-06-12
Payer: MEDICARE

## 2023-06-12 VITALS
RESPIRATION RATE: 18 BRPM | DIASTOLIC BLOOD PRESSURE: 74 MMHG | HEIGHT: 62 IN | SYSTOLIC BLOOD PRESSURE: 132 MMHG | OXYGEN SATURATION: 96 % | BODY MASS INDEX: 27.86 KG/M2 | WEIGHT: 151.38 LBS | TEMPERATURE: 99 F | HEART RATE: 72 BPM

## 2023-06-12 DIAGNOSIS — R35.0 URINARY FREQUENCY: ICD-10-CM

## 2023-06-12 DIAGNOSIS — G47.00 INSOMNIA, UNSPECIFIED TYPE: ICD-10-CM

## 2023-06-12 DIAGNOSIS — N39.3 STRESS INCONTINENCE: ICD-10-CM

## 2023-06-12 DIAGNOSIS — R39.15 URINARY URGENCY: ICD-10-CM

## 2023-06-12 DIAGNOSIS — Z79.899 POLYPHARMACY: ICD-10-CM

## 2023-06-12 DIAGNOSIS — I10 PRIMARY HYPERTENSION: ICD-10-CM

## 2023-06-12 DIAGNOSIS — E53.8 VITAMIN B12 DEFICIENCY: ICD-10-CM

## 2023-06-12 DIAGNOSIS — F33.1 MODERATE EPISODE OF RECURRENT MAJOR DEPRESSIVE DISORDER: ICD-10-CM

## 2023-06-12 DIAGNOSIS — G31.9 CEREBRAL ATROPHY, MILD: ICD-10-CM

## 2023-06-12 DIAGNOSIS — F51.01 PRIMARY INSOMNIA: ICD-10-CM

## 2023-06-12 DIAGNOSIS — K59.00 CONSTIPATION, UNSPECIFIED CONSTIPATION TYPE: ICD-10-CM

## 2023-06-12 DIAGNOSIS — E66.3 OVERWEIGHT (BMI 25.0-29.9): ICD-10-CM

## 2023-06-12 DIAGNOSIS — N39.41 URGE INCONTINENCE: ICD-10-CM

## 2023-06-12 DIAGNOSIS — D69.2 SENILE PURPURA: ICD-10-CM

## 2023-06-12 DIAGNOSIS — K21.9 GASTROESOPHAGEAL REFLUX DISEASE WITHOUT ESOPHAGITIS: Primary | ICD-10-CM

## 2023-06-12 DIAGNOSIS — I48.11 LONGSTANDING PERSISTENT ATRIAL FIBRILLATION: Chronic | ICD-10-CM

## 2023-06-12 PROBLEM — Z00.00 ENCOUNTER FOR MEDICARE ANNUAL WELLNESS EXAM: Status: RESOLVED | Noted: 2023-03-09 | Resolved: 2023-06-12

## 2023-06-12 PROBLEM — F13.94: Status: RESOLVED | Noted: 2022-05-16 | Resolved: 2023-06-12

## 2023-06-12 PROCEDURE — 3078F PR MOST RECENT DIASTOLIC BLOOD PRESSURE < 80 MM HG: ICD-10-PCS | Mod: CPTII,S$GLB,, | Performed by: INTERNAL MEDICINE

## 2023-06-12 PROCEDURE — 1101F PT FALLS ASSESS-DOCD LE1/YR: CPT | Mod: CPTII,S$GLB,, | Performed by: INTERNAL MEDICINE

## 2023-06-12 PROCEDURE — 1160F PR REVIEW ALL MEDS BY PRESCRIBER/CLIN PHARMACIST DOCUMENTED: ICD-10-PCS | Mod: CPTII,S$GLB,, | Performed by: INTERNAL MEDICINE

## 2023-06-12 PROCEDURE — 99417 PROLNG OP E/M EACH 15 MIN: CPT | Mod: S$GLB,,, | Performed by: INTERNAL MEDICINE

## 2023-06-12 PROCEDURE — 3288F FALL RISK ASSESSMENT DOCD: CPT | Mod: CPTII,S$GLB,, | Performed by: INTERNAL MEDICINE

## 2023-06-12 PROCEDURE — 99999 PR PBB SHADOW E&M-EST. PATIENT-LVL IV: ICD-10-PCS | Mod: PBBFAC,,, | Performed by: INTERNAL MEDICINE

## 2023-06-12 PROCEDURE — 3075F PR MOST RECENT SYSTOLIC BLOOD PRESS GE 130-139MM HG: ICD-10-PCS | Mod: CPTII,S$GLB,, | Performed by: INTERNAL MEDICINE

## 2023-06-12 PROCEDURE — 99215 PR OFFICE/OUTPT VISIT, EST, LEVL V, 40-54 MIN: ICD-10-PCS | Mod: S$GLB,,, | Performed by: INTERNAL MEDICINE

## 2023-06-12 PROCEDURE — 1123F ACP DISCUSS/DSCN MKR DOCD: CPT | Mod: CPTII,S$GLB,, | Performed by: INTERNAL MEDICINE

## 2023-06-12 PROCEDURE — 99417 PR PROLONGED SVC, OUTPT, W/WO DIRECT PT CONTACT,  EA ADDTL 15 MIN: ICD-10-PCS | Mod: S$GLB,,, | Performed by: INTERNAL MEDICINE

## 2023-06-12 PROCEDURE — 1101F PR PT FALLS ASSESS DOC 0-1 FALLS W/OUT INJ PAST YR: ICD-10-PCS | Mod: CPTII,S$GLB,, | Performed by: INTERNAL MEDICINE

## 2023-06-12 PROCEDURE — 3078F DIAST BP <80 MM HG: CPT | Mod: CPTII,S$GLB,, | Performed by: INTERNAL MEDICINE

## 2023-06-12 PROCEDURE — 1159F PR MEDICATION LIST DOCUMENTED IN MEDICAL RECORD: ICD-10-PCS | Mod: CPTII,S$GLB,, | Performed by: INTERNAL MEDICINE

## 2023-06-12 PROCEDURE — 99215 OFFICE O/P EST HI 40 MIN: CPT | Mod: S$GLB,,, | Performed by: INTERNAL MEDICINE

## 2023-06-12 PROCEDURE — 1160F RVW MEDS BY RX/DR IN RCRD: CPT | Mod: CPTII,S$GLB,, | Performed by: INTERNAL MEDICINE

## 2023-06-12 PROCEDURE — 99999 PR PBB SHADOW E&M-EST. PATIENT-LVL IV: CPT | Mod: PBBFAC,,, | Performed by: INTERNAL MEDICINE

## 2023-06-12 PROCEDURE — 1159F MED LIST DOCD IN RCRD: CPT | Mod: CPTII,S$GLB,, | Performed by: INTERNAL MEDICINE

## 2023-06-12 PROCEDURE — 1126F PR PAIN SEVERITY QUANTIFIED, NO PAIN PRESENT: ICD-10-PCS | Mod: CPTII,S$GLB,, | Performed by: INTERNAL MEDICINE

## 2023-06-12 PROCEDURE — 1123F PR ADV CARE PLAN DISCUSSED, PLAN OR SURROGATE DOCUMENTED: ICD-10-PCS | Mod: CPTII,S$GLB,, | Performed by: INTERNAL MEDICINE

## 2023-06-12 PROCEDURE — 3075F SYST BP GE 130 - 139MM HG: CPT | Mod: CPTII,S$GLB,, | Performed by: INTERNAL MEDICINE

## 2023-06-12 PROCEDURE — 3288F PR FALLS RISK ASSESSMENT DOCUMENTED: ICD-10-PCS | Mod: CPTII,S$GLB,, | Performed by: INTERNAL MEDICINE

## 2023-06-12 PROCEDURE — 1126F AMNT PAIN NOTED NONE PRSNT: CPT | Mod: CPTII,S$GLB,, | Performed by: INTERNAL MEDICINE

## 2023-06-12 RX ORDER — BUPROPION HYDROCHLORIDE 300 MG/1
300 TABLET ORAL DAILY
Qty: 30 TABLET | Refills: 11 | Status: SHIPPED | OUTPATIENT
Start: 2023-06-12 | End: 2024-06-11

## 2023-06-12 RX ORDER — MIRABEGRON 25 MG/1
25 TABLET, FILM COATED, EXTENDED RELEASE ORAL DAILY
Qty: 30 TABLET | Refills: 0 | Status: SHIPPED | OUTPATIENT
Start: 2023-06-12 | End: 2023-07-03

## 2023-06-12 RX ORDER — HYDROXYZINE PAMOATE 50 MG/1
50 CAPSULE ORAL NIGHTLY PRN
COMMUNITY
Start: 2023-05-11 | End: 2023-08-22

## 2023-06-12 RX ORDER — METRONIDAZOLE 7.5 MG/G
CREAM TOPICAL NIGHTLY PRN
COMMUNITY
Start: 2023-06-04

## 2023-06-12 RX ORDER — ARIPIPRAZOLE 2 MG/1
2 TABLET ORAL DAILY
Qty: 30 TABLET | Refills: 0 | Status: SHIPPED | OUTPATIENT
Start: 2023-06-12 | End: 2023-07-20

## 2023-06-12 RX ORDER — DULOXETIN HYDROCHLORIDE 60 MG/1
60 CAPSULE, DELAYED RELEASE ORAL EVERY MORNING
COMMUNITY
Start: 2023-06-04 | End: 2023-09-14 | Stop reason: SDUPTHER

## 2023-06-12 RX ORDER — CELECOXIB 100 MG/1
CAPSULE ORAL
COMMUNITY

## 2023-06-12 RX ORDER — IBUPROFEN 200 MG
TABLET ORAL
COMMUNITY

## 2023-06-12 NOTE — PROGRESS NOTES
INTERNAL MEDICINE PROGRESS/URGENT CARE NOTE    CHIEF COMPLAINT     Chief Complaint   Patient presents with    Follow-up     Myrbetriq medication review and refill, balance and posture changes requesting physical therapy, memory concerns, weight loss, sleep specialist and review of medication for sleeping       HPI   Madisyn Velasquez is a 76 y.o.female who presents with a PMHx of  Afib, HTN, HLD,  Depression/anxiety, GERD, fall with post traumatic SAH who presents today for her routine follow up  visit.     Her only medical concern today: worsening depression. 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 She has now established care with psychiatry     HTN: denies history of HTN. Note chart has multiple elevated readings. She was prescribed norvasc but has not been taking it.   BP today is within goal      Depression: ++PHQ9 14/27, was 18 on initial visit. Loss of interest in doing most things she previously enjoyed such as going to water aerobics class, insomnia, +change in appetite. Previously took wellbutrin and lexapro but says she has been out of the wellbutrin for months on accident. Recently got a refill of her medication two days and have restarted it so she is waiting for it to take effect.      Repeated falls with recent SAH: two falls were due to tripping over her husbands oxygen tube     Polypharmacy: long discussion re medication use. Advised against the use of celebrex and eliquis and she agreed to stop the celebrex because it doesn't seem to help her anyways. Advised against the  Chronic long term use of clonipin due to her fall history and being on a NOAC. She will try to wean down off this. She uses it for insomnia, and agrees to trial of trazodone instead while we wean her slowly of the clonazepam. Also advised good sleep hygiene.      chronic  forgetfulness. Noted with issues with medication.   Has a lot on her plate and constantly multitasking.   Her  thinks her hearing is not good but she thinks its selective hearing loss  TSH and vitamin B12 labs were ordered at her last visit but not done.   Specifically describes that when she goies into a room ofetn forget when she went in there for. Takes a fruit for her  but then forgets where she put it. Now having issues with major ADLs and iADLs such as bill paying or driving but with minor daily functions.   She does do a pill box. Etc      Insomnia: patient now takes clopinin (was on this prior to my meeting her ut was on 1mg now on only 1/2 taht dose) which is great that weve been able to wean. But she was unsuccessful at weaning lower. Also took the trazodone and christie th makes her sleep ebtter. We tried with one each and she could not fall asleep or stay asleep.       Home Medications:  Prior to Admission medications    Medication Sig Start Date End Date Taking? Authorizing Provider   buPROPion (WELLBUTRIN SR) 150 MG TBSR 12 hr tablet Take 1 tablet (150 mg total) by mouth 2 (two) times daily. 10/28/22 10/28/23  Ela Mays MD   clonazePAM (KLONOPIN) 0.5 MG tablet Take 1 tablet (0.5 mg total) by mouth every evening. 11/14/22   Ela Mays MD   ELIQUIS 5 mg Tab Take 5 mg by mouth 2 (two) times daily. 3/8/22   Historical Provider   EScitalopram oxalate (LEXAPRO) 20 MG tablet Take 1 tablet (20 mg total) by mouth once daily. 6/23/22   Ela Mays MD   mirabegron (MYRBETRIQ) 25 mg Tb24 ER tablet Take 1 tablet (25 mg total) by mouth once daily. 1/23/23   Diane Cantor NP   rosuvastatin (CRESTOR) 10 MG tablet Take 1 tablet (10 mg total) by mouth once daily. 6/7/22   Ela Mays MD   sotaloL (BETAPACE) 80 MG tablet Take 40 mg by mouth 2 (two) times daily.    Historical Provider   traZODone (DESYREL) 50 MG tablet Take 1 tablet (50 mg total) by mouth every evening.  "8/9/22 8/9/23  Ela Mays MD       Review of Systems:  Review of Systems    Advance Care Planning     Date: 06/12/2023    Power of   I initiated the process of advance care planning today and explained the importance of this process to the patient.  I introduced the concept of advance directives to the patient, as well. Then the patient received detailed information about the importance of designating a Health Care Power of  (HCPOA). She was also instructed to communicate with this person about their wishes for future healthcare, should she become sick and lose decision-making capacity. The patient has previously appointed a HCPOA. After our discussion, the patient has decided to complete a HCPOA                 PHYSICAL EXAM     /74 (BP Location: Right arm, Patient Position: Sitting, BP Method: Medium (Manual))   Pulse 72   Temp 98.9 °F (37.2 °C) (Oral)   Resp 18   Ht 5' 2" (1.575 m)   Wt 68.6 kg (151 lb 5.5 oz)   SpO2 96%   BMI 27.68 kg/m²     GEN - A+OX4, NAD   HEENT - PERRL, EOMI, OP clear  Neck - No thyromegaly or cervical LAD. No thyroid masses felt.  CV - RRR, no m/r   Chest - CTAB, no wheezing or rhonchi  Abd - S/NT/ND/+BS.   Ext - 2+BDP and radial pulses. No C/C/E.  Skin - No rash.    LABS       ASSESSMENT/PLAN     Madisyn Velasquez is a 77 y.o. female with  1. Gastroesophageal reflux disease without esophagitis  Overview:  Well controlled       2. Longstanding persistent atrial fibrillation      3. Primary hypertension  Overview:  BP is well controlled.   Continue current therapy    Orders:  -     Comprehensive Metabolic Panel; Future; Expected date: 06/12/2023  -     TSH; Future; Expected date: 06/12/2023    4. Moderate episode of recurrent major depressive disorder  Trial of abilify   -     Ambulatory referral/consult to Value Based Primary Care Behavioral Health; Future; Expected date: 06/19/2023    5. Polypharmacy  Discussed side effects     6. Cerebral atrophy, " "mild  Overview:  Found on CT from 3/22 (went to ER after a fall, found to have a SAH which self resolved). MRI and CT both showed "mild volume loss", consistent with age related cerebral atrophy.       7. Primary insomnia  On trazodone and hydroxyzine    8. Overweight (BMI 25.0-29.9)  -     CBC Auto Differential; Future; Expected date: 06/12/2023  -     Lipid Panel; Future; Expected date: 06/12/2023    9. Senile purpura  -benign skin condition. Often caused by the thin skin associated with aging.   -counseled patient on proper skin protection and moisturization and avoidance of skin trauma     -     CBC Auto Differential; Future; Expected date: 06/12/2023    10. Vitamin B12 deficiency  -     CBC Auto Differential; Future; Expected date: 06/12/2023  -     Vitamin B12; Future; Expected date: 06/12/2023    11. Urinary frequency  -     mirabegron (MYRBETRIQ) 25 mg Tb24 ER tablet; Take 1 tablet (25 mg total) by mouth once daily.  Dispense: 30 tablet; Refill: 0    12. Urinary urgency  -     mirabegron (MYRBETRIQ) 25 mg Tb24 ER tablet; Take 1 tablet (25 mg total) by mouth once daily.  Dispense: 30 tablet; Refill: 0    13. Urge incontinence  -     mirabegron (MYRBETRIQ) 25 mg Tb24 ER tablet; Take 1 tablet (25 mg total) by mouth once daily.  Dispense: 30 tablet; Refill: 0    14. Stress incontinence  -     mirabegron (MYRBETRIQ) 25 mg Tb24 ER tablet; Take 1 tablet (25 mg total) by mouth once daily.  Dispense: 30 tablet; Refill: 0    15. Constipation, unspecified constipation type  -     mirabegron (MYRBETRIQ) 25 mg Tb24 ER tablet; Take 1 tablet (25 mg total) by mouth once daily.  Dispense: 30 tablet; Refill: 0    16. Insomnia, unspecified type  -     Ambulatory referral/consult to Sleep Disorders; Future; Expected date: 06/19/2023    Other orders  -     buPROPion (WELLBUTRIN XL) 300 MG 24 hr tablet; Take 1 tablet (300 mg total) by mouth once daily.  Dispense: 30 tablet; Refill: 11  -     ARIPiprazole (ABILIFY) 2 MG Tab; Take " 1 tablet (2 mg total) by mouth once daily.  Dispense: 30 tablet; Refill: 0           WORRY SCORE 2    RTC in 1 months, sooner if needed and depending on labs.for depression    Ela Mays MD  Board Certified Internist/Geriatrician  [facility]

## 2023-06-18 ENCOUNTER — TELEPHONE (OUTPATIENT)
Dept: PRIMARY CARE CLINIC | Facility: CLINIC | Age: 78
End: 2023-06-18

## 2023-06-19 RX ORDER — ARIPIPRAZOLE 2 MG/1
TABLET ORAL
Qty: 30 TABLET | Refills: 0 | OUTPATIENT
Start: 2023-06-19

## 2023-06-19 NOTE — TELEPHONE ENCOUNTER
True but that was a week ago. Its always 30 days when its a new prescription hence why it was only 30 to try it first before changing it to 90 tabs. Thanks

## 2023-06-19 NOTE — TELEPHONE ENCOUNTER
I contacted [facility], they weren't open at first, they stated the medication was picked up on 6/14/2023 and they where just processing the next request early since it was for a 30 day supply. So would you like me to refuse it for duplicate ?

## 2023-06-19 NOTE — TELEPHONE ENCOUNTER
Medication filled 1 week ago. Dont think shes due for a refill, can you please check with pharmacy.

## 2023-06-19 NOTE — TELEPHONE ENCOUNTER
4 weeks early is a bit much. Yeah id like her to be on the medication for a bit before I automatically refill. So lets refuse and she may call me once shes been on it for a while with no side effects, then I will happily refill.

## 2023-06-19 NOTE — TELEPHONE ENCOUNTER
I will contact pharmacu but I did research the rx I process the request because I sent the last refill was only for a 30 day supply

## 2023-06-23 ENCOUNTER — CLINICAL SUPPORT (OUTPATIENT)
Dept: PRIMARY CARE CLINIC | Facility: CLINIC | Age: 78
End: 2023-06-23
Payer: MEDICARE

## 2023-06-23 DIAGNOSIS — F32.A DEPRESSIVE DISORDER: ICD-10-CM

## 2023-06-23 DIAGNOSIS — F33.1 MODERATE EPISODE OF RECURRENT MAJOR DEPRESSIVE DISORDER: ICD-10-CM

## 2023-06-23 DIAGNOSIS — Z63.6 CAREGIVER STRESS: Primary | ICD-10-CM

## 2023-06-23 PROCEDURE — 99999 PR PBB SHADOW E&M-EST. PATIENT-LVL I: ICD-10-PCS | Mod: PBBFAC,,, | Performed by: SOCIAL WORKER

## 2023-06-23 PROCEDURE — 99999 PR PBB SHADOW E&M-EST. PATIENT-LVL I: CPT | Mod: PBBFAC,,, | Performed by: SOCIAL WORKER

## 2023-06-23 PROCEDURE — 99499 UNLISTED E&M SERVICE: CPT | Mod: S$GLB,,, | Performed by: SOCIAL WORKER

## 2023-06-23 PROCEDURE — 99499 NO LOS: ICD-10-PCS | Mod: S$GLB,,, | Performed by: SOCIAL WORKER

## 2023-06-23 SDOH — SOCIAL DETERMINANTS OF HEALTH (SDOH): DEPENDENT RELATIVE NEEDING CARE AT HOME: Z63.6
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
## 2023-07-02 DIAGNOSIS — R39.15 URINARY URGENCY: ICD-10-CM

## 2023-07-02 DIAGNOSIS — R35.0 URINARY FREQUENCY: ICD-10-CM

## 2023-07-02 DIAGNOSIS — N39.41 URGE INCONTINENCE: ICD-10-CM

## 2023-07-02 DIAGNOSIS — N39.3 STRESS INCONTINENCE: ICD-10-CM

## 2023-07-02 DIAGNOSIS — K59.00 CONSTIPATION, UNSPECIFIED CONSTIPATION TYPE: ICD-10-CM

## 2023-07-03 RX ORDER — MIRABEGRON 25 MG/1
TABLET, FILM COATED, EXTENDED RELEASE ORAL
Qty: 30 TABLET | Refills: 0 | Status: SHIPPED | OUTPATIENT
Start: 2023-07-03 | End: 2023-08-01

## 2023-07-10 ENCOUNTER — CLINICAL SUPPORT (OUTPATIENT)
Dept: PRIMARY CARE CLINIC | Facility: CLINIC | Age: 78
End: 2023-07-10
Payer: MEDICARE

## 2023-07-10 ENCOUNTER — OFFICE VISIT (OUTPATIENT)
Dept: PRIMARY CARE CLINIC | Facility: CLINIC | Age: 78
End: 2023-07-10
Payer: MEDICARE

## 2023-07-10 VITALS
TEMPERATURE: 99 F | DIASTOLIC BLOOD PRESSURE: 72 MMHG | HEART RATE: 66 BPM | HEIGHT: 62 IN | SYSTOLIC BLOOD PRESSURE: 130 MMHG | WEIGHT: 147.25 LBS | RESPIRATION RATE: 16 BRPM | BODY MASS INDEX: 27.1 KG/M2

## 2023-07-10 DIAGNOSIS — R45.7 CAREGIVER STRESS SYNDROME: Primary | ICD-10-CM

## 2023-07-10 DIAGNOSIS — Z63.6 CAREGIVER STRESS: ICD-10-CM

## 2023-07-10 DIAGNOSIS — E53.8 B12 DEFICIENCY: ICD-10-CM

## 2023-07-10 DIAGNOSIS — F33.1 MODERATE EPISODE OF RECURRENT MAJOR DEPRESSIVE DISORDER: Primary | ICD-10-CM

## 2023-07-10 PROCEDURE — 3078F PR MOST RECENT DIASTOLIC BLOOD PRESSURE < 80 MM HG: ICD-10-PCS | Mod: HCNC,CPTII,S$GLB, | Performed by: INTERNAL MEDICINE

## 2023-07-10 PROCEDURE — 3288F PR FALLS RISK ASSESSMENT DOCUMENTED: ICD-10-PCS | Mod: HCNC,CPTII,S$GLB, | Performed by: INTERNAL MEDICINE

## 2023-07-10 PROCEDURE — 99499 UNLISTED E&M SERVICE: CPT | Mod: HCNC,S$GLB,, | Performed by: SOCIAL WORKER

## 2023-07-10 PROCEDURE — 3075F SYST BP GE 130 - 139MM HG: CPT | Mod: HCNC,CPTII,S$GLB, | Performed by: INTERNAL MEDICINE

## 2023-07-10 PROCEDURE — 1157F PR ADVANCE CARE PLAN OR EQUIV PRESENT IN MEDICAL RECORD: ICD-10-PCS | Mod: HCNC,CPTII,S$GLB, | Performed by: INTERNAL MEDICINE

## 2023-07-10 PROCEDURE — 1126F PR PAIN SEVERITY QUANTIFIED, NO PAIN PRESENT: ICD-10-PCS | Mod: HCNC,CPTII,S$GLB, | Performed by: INTERNAL MEDICINE

## 2023-07-10 PROCEDURE — 99999 PR PBB SHADOW E&M-EST. PATIENT-LVL IV: ICD-10-PCS | Mod: PBBFAC,HCNC,, | Performed by: INTERNAL MEDICINE

## 2023-07-10 PROCEDURE — 1159F MED LIST DOCD IN RCRD: CPT | Mod: HCNC,CPTII,S$GLB, | Performed by: INTERNAL MEDICINE

## 2023-07-10 PROCEDURE — 1160F PR REVIEW ALL MEDS BY PRESCRIBER/CLIN PHARMACIST DOCUMENTED: ICD-10-PCS | Mod: HCNC,CPTII,S$GLB, | Performed by: INTERNAL MEDICINE

## 2023-07-10 PROCEDURE — 3078F DIAST BP <80 MM HG: CPT | Mod: HCNC,CPTII,S$GLB, | Performed by: INTERNAL MEDICINE

## 2023-07-10 PROCEDURE — 3288F FALL RISK ASSESSMENT DOCD: CPT | Mod: HCNC,CPTII,S$GLB, | Performed by: INTERNAL MEDICINE

## 2023-07-10 PROCEDURE — 99499 NO LOS: ICD-10-PCS | Mod: HCNC,S$GLB,, | Performed by: SOCIAL WORKER

## 2023-07-10 PROCEDURE — 3075F PR MOST RECENT SYSTOLIC BLOOD PRESS GE 130-139MM HG: ICD-10-PCS | Mod: HCNC,CPTII,S$GLB, | Performed by: INTERNAL MEDICINE

## 2023-07-10 PROCEDURE — 1157F ADVNC CARE PLAN IN RCRD: CPT | Mod: HCNC,CPTII,S$GLB, | Performed by: INTERNAL MEDICINE

## 2023-07-10 PROCEDURE — 1159F PR MEDICATION LIST DOCUMENTED IN MEDICAL RECORD: ICD-10-PCS | Mod: HCNC,CPTII,S$GLB, | Performed by: INTERNAL MEDICINE

## 2023-07-10 PROCEDURE — 1101F PT FALLS ASSESS-DOCD LE1/YR: CPT | Mod: HCNC,CPTII,S$GLB, | Performed by: INTERNAL MEDICINE

## 2023-07-10 PROCEDURE — 1101F PR PT FALLS ASSESS DOC 0-1 FALLS W/OUT INJ PAST YR: ICD-10-PCS | Mod: HCNC,CPTII,S$GLB, | Performed by: INTERNAL MEDICINE

## 2023-07-10 PROCEDURE — 99215 PR OFFICE/OUTPT VISIT, EST, LEVL V, 40-54 MIN: ICD-10-PCS | Mod: HCNC,25,S$GLB, | Performed by: INTERNAL MEDICINE

## 2023-07-10 PROCEDURE — 99215 OFFICE O/P EST HI 40 MIN: CPT | Mod: HCNC,25,S$GLB, | Performed by: INTERNAL MEDICINE

## 2023-07-10 PROCEDURE — 1160F RVW MEDS BY RX/DR IN RCRD: CPT | Mod: HCNC,CPTII,S$GLB, | Performed by: INTERNAL MEDICINE

## 2023-07-10 PROCEDURE — 1126F AMNT PAIN NOTED NONE PRSNT: CPT | Mod: HCNC,CPTII,S$GLB, | Performed by: INTERNAL MEDICINE

## 2023-07-10 PROCEDURE — 99999 PR PBB SHADOW E&M-EST. PATIENT-LVL IV: CPT | Mod: PBBFAC,HCNC,, | Performed by: INTERNAL MEDICINE

## 2023-07-10 PROCEDURE — 96372 THER/PROPH/DIAG INJ SC/IM: CPT | Mod: HCNC,S$GLB,, | Performed by: INTERNAL MEDICINE

## 2023-07-10 PROCEDURE — 96372 PR INJECTION,THERAP/PROPH/DIAG2ST, IM OR SUBCUT: ICD-10-PCS | Mod: HCNC,S$GLB,, | Performed by: INTERNAL MEDICINE

## 2023-07-10 RX ORDER — CYANOCOBALAMIN 1000 UG/ML
1000 INJECTION, SOLUTION INTRAMUSCULAR; SUBCUTANEOUS
Status: DISCONTINUED | OUTPATIENT
Start: 2023-07-11 | End: 2023-09-14

## 2023-07-10 RX ORDER — BACLOFEN 10 MG/1
20 TABLET ORAL NIGHTLY
COMMUNITY
Start: 2023-06-13 | End: 2024-03-12

## 2023-07-10 RX ORDER — ESCITALOPRAM OXALATE 20 MG/1
TABLET ORAL
Qty: 90 TABLET | Refills: 1 | Status: SHIPPED | OUTPATIENT
Start: 2023-07-10 | End: 2023-10-17

## 2023-07-10 RX ADMIN — CYANOCOBALAMIN 1000 MCG: 1000 INJECTION, SOLUTION INTRAMUSCULAR; SUBCUTANEOUS at 03:07

## 2023-07-10 SDOH — SOCIAL DETERMINANTS OF HEALTH (SDOH): DEPENDENT RELATIVE NEEDING CARE AT HOME: Z63.6

## 2023-07-10 NOTE — PROGRESS NOTES
INTERNAL MEDICINE PROGRESS/URGENT CARE NOTE    CHIEF COMPLAINT     Chief Complaint   Patient presents with    Follow-up       HPI     Madisyn Velasquez is a 77 y.o.  female who presents for an urgent/follow up visit today.  Here today for depression follow up  Was started on abilify at her last visit  Also seeing our LCSW.   Reports that ehr mood is much better. Actually gets out of bed. And been having lunch with her friends and in her garden. Shes doing quite well.   Today we discussed trying off the wellbutrin.   Shes titrated off the trazodone    Home Medications:  Prior to Admission medications    Medication Sig Start Date End Date Taking? Authorizing Provider   ARIPiprazole (ABILIFY) 2 MG Tab Take 1 tablet (2 mg total) by mouth once daily. 6/12/23 6/11/24  Ela Mays MD   buPROPion (WELLBUTRIN SR) 150 MG TBSR 12 hr tablet Take 1 tablet (150 mg total) by mouth 2 (two) times daily. 10/28/22 10/28/23  Ela Mays MD   buPROPion (WELLBUTRIN XL) 300 MG 24 hr tablet Take 1 tablet (300 mg total) by mouth once daily. 6/12/23 6/11/24  Ela Mays MD   celecoxib (CELEBREX) 100 MG capsule Celebrex    Historical Provider   DULoxetine (CYMBALTA) 60 MG capsule Take 60 mg by mouth every morning. 6/4/23   Historical Provider   ELIQUIS 5 mg Tab Take 5 mg by mouth 2 (two) times daily. 3/8/22   Historical Provider   EScitalopram oxalate (LEXAPRO) 20 MG tablet Take 1 tablet (20 mg total) by mouth once daily. 6/23/22   Ela Mays MD   hydrOXYzine pamoate (VISTARIL) 50 MG Cap Take 50 mg by mouth nightly as needed. 5/11/23   Historical Provider   ibuprofen (ADVIL,MOTRIN) 200 MG tablet ibuprofen 200 mg tablet   Take 1 tablet 4 times a day by oral route.    Historical Provider   metronidazole 0.75% (METROCREAM) 0.75 % Crea Apply topically nightly as needed. 6/4/23   Historical Provider   MYRBETRIQ 25 mg Tb24 ER tablet TAKE 1 TABLET(25 MG) BY MOUTH EVERY DAY 7/3/23   Efrain Howe MD  "  rosuvastatin (CRESTOR) 10 MG tablet Take 1 tablet (10 mg total) by mouth once daily. 6/7/22   Ela Mays MD   sotaloL (BETAPACE) 80 MG tablet Take 40 mg by mouth 2 (two) times daily.    Historical Provider   traZODone (DESYREL) 50 MG tablet Take 1 tablet (50 mg total) by mouth every evening. 8/9/22 8/9/23  Ela Mays MD           PHYSICAL EXAM     /72 (BP Location: Right arm, Patient Position: Sitting, BP Method: Medium (Manual))   Pulse 66   Temp 98.8 °F (37.1 °C) (Oral)   Resp 16   Ht 5' 2" (1.575 m)   Wt 66.8 kg (147 lb 4.3 oz)   BMI 26.94 kg/m²     GEN - A+OX4, NAD   HEENT - PERRL, EOMI, OP clear  Neck - No thyromegaly or cervical LAD. No thyroid masses felt.  CV - RRR, no m/r   Chest - CTAB, no wheezing or rhonchi  Abd - S/NT/ND/+BS.   Ext - 2+BDP and radial pulses. No C/C/E.  Skin - No rash.    LABS     ASSESSMENT/PLAN     Madisyn Velasquez is a 77 y.o. female with  1. Moderate episode of recurrent major depressive disorder  Much improved with abilify.     2. Caregiver stress  Improved since her mood is better and shes been getting out     3. B12 deficiency  -     Prior authorization Order    Other orders  -     cyanocobalamin injection 1,000 mcg      RTC in 3 months, sooner if needed and depending on labs.    Ela Mays MD  Board Certified Internist/Geriatrician  [facility]      "

## 2023-07-10 NOTE — PROGRESS NOTES
"  Individual Psychotherapy (PhD/LCSW)    7/10/2023    Site:  [place] [id number]      Chief complaint/reason for encounter: interpersonal     Mood check: scale of:0 best, 10 worst. Patient rated:  Depression at  Anxiety at    Risk parameters:  Patient reports no suicidal ideation  Patient reports no homicidal ideation  Patient reports no self-injurious behavior  Patient reports no violent behavior    [place]:  N/A - first session since initial BHI    Review of home assignment:   N/A - first session since initial BHI    [place]:  Goals   Identified areas of clinical concern     History of present condition/content of session:   Review of sx: She c/o lack of motivation, being forgetful, easily distracted, "can't keep track of anything," for about the past 6 months to a year. She noted being aggravated because she has initial insomnia. She denied having racing thoughts when trying to sleep. Pt continues to voice displeasure over not being able to get rx for Clonazepam. She noted it improve sleep.      The main topic of discussion concern being primary caregiver for her  aged 89 y/o, who has terminal illness. She reported having very little help with caring for him. She said his children are a stressor and not help. Pt's niece and her partner come to dinner about 3x/week, and help with care giving. Also, he has a PCA that comes 3x/week. She voiced concern that they are about to out spend their income.     Self care:  She said she is going to the gym 3x/week and swimming laps. She continues to work with plants. She admitted she bought some plants recently, that really didn't to spend the money.     Pertinent history:  Her , Igor, aged 89 y/o, who has terminal illness. She noted he is working with Hospice. His decline in health onset about 2 years ago. 10 years ago her great nephew  by suicide. She was encouraged to understand that care giving is stressful. She has 2 children, son aged 27, who lives in [place].He " has 2 children. Her daughter lives in [place] and does not have children.       Therapeutic Intervention:   Pt was assessed for present condition and areas of clinical concern. Time was spent assessing pt's level of motivation for treatment. She was encouraged to identify signs and symptoms of care giver stress. Self care was also encouraged.     Treatment plan:  Target symptoms:  caregiver stress   Why chosen therapy is appropriate versus another modality: evidence based practice  Outcome monitoring methods: checklist/rating scale  Therapeutic intervention type: supportive psychotherapy      Patient's response to intervention:  The patient's response to intervention is accepting.    Progress toward goals and other mental status changes:  The patient's progress toward goals is  N/A - first session since initial BHI .    Diagnosis:   Caregiver Stress Syndrome       Plan:  individual psychotherapy - patient did not identify any goals for therapy. She has appointment with PCP after this appt.     Return to clinic: 2 weeks 7/24/2023 at 2:00    Length of Service (minutes): 60

## 2023-07-24 ENCOUNTER — SOCIAL WORK (OUTPATIENT)
Dept: PRIMARY CARE CLINIC | Facility: CLINIC | Age: 78
End: 2023-07-24
Payer: MEDICARE

## 2023-07-24 PROCEDURE — 99499 UNLISTED E&M SERVICE: CPT | Mod: S$GLB,,, | Performed by: SOCIAL WORKER

## 2023-07-24 PROCEDURE — 99499 NO LOS: ICD-10-PCS | Mod: S$GLB,,, | Performed by: SOCIAL WORKER

## 2023-07-24 NOTE — PROGRESS NOTES
"  Pt was originally scheduled for in person appt. She called staff earlier and asked to change to a virtual visit. Clinician was ready to for virtual session by scheduled time at 1:30. Patient did not a connect. At 1:46 pm she telephoned clinician.  She stated that she had minor surgery on her foot and could not come into the clinic.    When asked to identify what she would like to put on the agenda for session, she stated she did not. Pt stated she was not doing well prior to seeing clinician for initial BHI. However, she said the PCP changed her medications. Since then, she reported feeling much better. She said this morning, she got up, put on make-up, and got out of the bed (improvement with her lack of motivation). Sleep, appetite, and social isolation were reported as improved as well. She stated she thinks that her depression was related to COVID 19, and "chemical depression." She denied need for services. Clinician inquired into her level of stress secondary to being a caregiver for  with fatal disease. She stated she is doing okay, and said she will contact clinician if needed.       Self care:  She said she is going to the gym 3x/week and swimming laps. She continues to work with plants. She also said she is speaking to 2 garden clubs.     Pertinent history:  Her , Igor, aged 89 y/o, who has terminal illness. She noted he is working with Hospice. His decline in health onset about 2 years ago. 10 years ago her great nephew  by suicide. She was encouraged to understand that care giving is stressful. She has 2 children, son aged 27, who lives in [place].He has 2 children. Her daughter lives in [place] and does not have children.         Diagnosis:   Major Depressive Disorder       Plan:  Pt denied need for continued services from clinician. Pt voiced plan to contact clinician if needed.   "

## 2023-08-01 ENCOUNTER — TELEPHONE (OUTPATIENT)
Dept: PRIMARY CARE CLINIC | Facility: CLINIC | Age: 78
End: 2023-08-01
Payer: MEDICARE

## 2023-08-01 DIAGNOSIS — N39.41 URGE INCONTINENCE: ICD-10-CM

## 2023-08-01 DIAGNOSIS — K59.00 CONSTIPATION, UNSPECIFIED CONSTIPATION TYPE: ICD-10-CM

## 2023-08-01 DIAGNOSIS — N39.3 STRESS INCONTINENCE: ICD-10-CM

## 2023-08-01 DIAGNOSIS — R39.15 URINARY URGENCY: ICD-10-CM

## 2023-08-01 DIAGNOSIS — R35.0 URINARY FREQUENCY: ICD-10-CM

## 2023-08-01 RX ORDER — MIRABEGRON 25 MG/1
TABLET, FILM COATED, EXTENDED RELEASE ORAL
Qty: 90 TABLET | Refills: 0 | Status: SHIPPED | OUTPATIENT
Start: 2023-08-01 | End: 2023-11-13

## 2023-08-01 NOTE — TELEPHONE ENCOUNTER
Refill Routing Note   Medication(s) are not appropriate for processing by [facility] for the following reason(s):      Non-participating provider    [facility] action(s):  Route Care Due:  None identified            Appointments  past 12m or future 3m with PCP    Date Provider   Last Visit   7/10/2023 Ela Mays MD   Next Visit   9/6/2023 Ela Mays MD   ED visits in past 90 days: 0        Note composed:8:57 AM 08/01/2023

## 2023-08-01 NOTE — TELEPHONE ENCOUNTER
----- Message from Yeison Lu sent at 8/1/2023 12:14 PM CDT -----  Contact: pt  Type: Needs Medical Advice  Who Called: pt  Best Call Back Number: [phone]    Additional Information: Pt is calling the office in regards to needing a prescription for nubectrix.Please call back and advise.

## 2023-08-08 ENCOUNTER — OFFICE VISIT (OUTPATIENT)
Dept: PULMONOLOGY | Facility: CLINIC | Age: 78
End: 2023-08-08
Payer: MEDICARE

## 2023-08-08 VITALS
DIASTOLIC BLOOD PRESSURE: 74 MMHG | WEIGHT: 145 LBS | HEART RATE: 77 BPM | SYSTOLIC BLOOD PRESSURE: 124 MMHG | OXYGEN SATURATION: 97 % | BODY MASS INDEX: 26.52 KG/M2

## 2023-08-08 DIAGNOSIS — R68.89 FORGETFULNESS: ICD-10-CM

## 2023-08-08 DIAGNOSIS — G47.00 INSOMNIA, UNSPECIFIED TYPE: Primary | ICD-10-CM

## 2023-08-08 DIAGNOSIS — F51.01 PRIMARY INSOMNIA: ICD-10-CM

## 2023-08-08 DIAGNOSIS — F32.A DEPRESSION, UNSPECIFIED DEPRESSION TYPE: ICD-10-CM

## 2023-08-08 PROCEDURE — 3074F SYST BP LT 130 MM HG: CPT | Mod: CPTII,S$GLB,, | Performed by: NURSE PRACTITIONER

## 2023-08-08 PROCEDURE — 3074F PR MOST RECENT SYSTOLIC BLOOD PRESSURE < 130 MM HG: ICD-10-PCS | Mod: CPTII,S$GLB,, | Performed by: NURSE PRACTITIONER

## 2023-08-08 PROCEDURE — 3078F PR MOST RECENT DIASTOLIC BLOOD PRESSURE < 80 MM HG: ICD-10-PCS | Mod: CPTII,S$GLB,, | Performed by: NURSE PRACTITIONER

## 2023-08-08 PROCEDURE — 1157F ADVNC CARE PLAN IN RCRD: CPT | Mod: CPTII,S$GLB,, | Performed by: NURSE PRACTITIONER

## 2023-08-08 PROCEDURE — 1126F AMNT PAIN NOTED NONE PRSNT: CPT | Mod: CPTII,S$GLB,, | Performed by: NURSE PRACTITIONER

## 2023-08-08 PROCEDURE — 3078F DIAST BP <80 MM HG: CPT | Mod: CPTII,S$GLB,, | Performed by: NURSE PRACTITIONER

## 2023-08-08 PROCEDURE — 1159F PR MEDICATION LIST DOCUMENTED IN MEDICAL RECORD: ICD-10-PCS | Mod: CPTII,S$GLB,, | Performed by: NURSE PRACTITIONER

## 2023-08-08 PROCEDURE — 1126F PR PAIN SEVERITY QUANTIFIED, NO PAIN PRESENT: ICD-10-PCS | Mod: CPTII,S$GLB,, | Performed by: NURSE PRACTITIONER

## 2023-08-08 PROCEDURE — 1157F PR ADVANCE CARE PLAN OR EQUIV PRESENT IN MEDICAL RECORD: ICD-10-PCS | Mod: CPTII,S$GLB,, | Performed by: NURSE PRACTITIONER

## 2023-08-08 PROCEDURE — 99204 OFFICE O/P NEW MOD 45 MIN: CPT | Mod: S$GLB,,, | Performed by: NURSE PRACTITIONER

## 2023-08-08 PROCEDURE — 1159F MED LIST DOCD IN RCRD: CPT | Mod: CPTII,S$GLB,, | Performed by: NURSE PRACTITIONER

## 2023-08-08 PROCEDURE — 99204 PR OFFICE/OUTPT VISIT, NEW, LEVL IV, 45-59 MIN: ICD-10-PCS | Mod: S$GLB,,, | Performed by: NURSE PRACTITIONER

## 2023-08-08 NOTE — PROGRESS NOTES
SUBJECTIVE:    Patient ID: Madisyn Velasquez is a 77 y.o. female.    Chief Complaint: New Patient (New Patient/Referred by Ela Mays for insomnia)    HPI    Patient referred here today for insomnia.  The patient has had insomnia for many years for which she has tried several drugs. She has taking Klonopin now for many years and she feels it is the only drug that helps her fall asleep.  She now takes 0.25 mg of Klonopin at night down from 1mg that she took for some time. She was given Trazodone to take in place of Klonopin but she stopped taking.  She has also tried Vistaril and stopped it because it did not work. She has Baclofen that she takes as needed for spasms.  She has a significant depression and anxiety history for which she states is now under control since the addition of Abilfy.  She also takes Lexapro, Wellbutrin, and Cymbalta. She has seen a mental health provider but states it was all via virtual visits and she would like to establish with a psychiatrist in [place] if possible to manage her medications and depression.  She has a history of falls which she does not feel were related to all the medications she is on.  She has had forgetfulness, brain fog, some new daytime fatigue while reading lately. 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 She is willing to do a sleep study at home.  She would like to continue to take the Klonopin because she feels it is what helps her sleep.   Past Medical History:   Diagnosis Date    Anxiety     Constipation     Depression     Insomnia      Past Surgical History:   Procedure Laterality Date    ADENOIDECTOMY      as a child     SECTION      COSMETIC SURGERY      breast augmentation    FRACTURE SURGERY      arm    HIP REPLACEMENT ARTHROPLASTY      JOINT REPLACEMENT      left hip, right knee    REVISION OF TOTAL KNEE REPLACEMENT       TONSILLECTOMY       Family History   Problem Relation Age of Onset     Heart disease Mother     Depression Mother     Arthritis Mother     Hearing loss Father     Arthritis Father     Alcohol abuse Father     Diabetes Maternal Aunt         Social History:   Marital Status:   Occupation: Data Unavailable  Alcohol History:  reports current alcohol use of about 10.0 standard drinks of alcohol per week.  Tobacco History:  reports that she has never smoked. She has never used smokeless tobacco.  Drug History:  reports no history of drug use.    Review of patient's allergies indicates:  No Known Allergies    Current Outpatient Medications   Medication Sig Dispense Refill    ARIPiprazole (ABILIFY) 2 MG Tab TAKE 1 TABLET(2 MG) BY MOUTH EVERY DAY 90 tablet 0    baclofen (LIORESAL) 10 MG tablet Take 20 mg by mouth every evening.      buPROPion (WELLBUTRIN XL) 300 MG 24 hr tablet Take 1 tablet (300 mg total) by mouth once daily. 30 tablet 11    DULoxetine (CYMBALTA) 60 MG capsule Take 60 mg by mouth every morning.      ELIQUIS 5 mg Tab Take 5 mg by mouth 2 (two) times daily.      EScitalopram oxalate (LEXAPRO) 20 MG tablet TAKE 1 TABLET(20 MG) BY MOUTH EVERY DAY 90 tablet 1    ibuprofen (ADVIL,MOTRIN) 200 MG tablet ibuprofen 200 mg tablet   Take 1 tablet 4 times a day by oral route.      metronidazole 0.75% (METROCREAM) 0.75 % Crea Apply topically nightly as needed.      mirabegron (MYRBETRIQ) 25 mg Tb24 ER tablet TAKE 1 TABLET(25 MG) BY MOUTH EVERY DAY 90 tablet 0    rosuvastatin (CRESTOR) 10 MG tablet Take 1 tablet (10 mg total) by mouth once daily. 30 tablet 3    sotaloL (BETAPACE) 80 MG tablet Take 40 mg by mouth 2 (two) times daily.      celecoxib (CELEBREX) 100 MG capsule Celebrex      hydrOXYzine pamoate (VISTARIL) 50 MG Cap Take 50 mg by mouth nightly as needed.       Current Facility-Administered Medications   Medication Dose Route Frequency Provider Last Rate Last Admin    cyanocobalamin injection 1,000 mcg  1,000 mcg Intramuscular Q30 Days Ela Mays MD   1,000 mcg  at 07/10/23 1509           Review of Systems  General:  occasional naps during the day. Chronic fatigue   Eyes: Vision is good.  ENT:  No sinusitis or pharyngitis.   Heart:: history of atrial fib   Lungs: No cough, sputum, or wheezing.  GI:  reflux.  : wakes up once a night to urinate   Musculoskeletal: No joint pain or myalgias.  Skin: No lesions or rashes.  Neuro: forgetfulness  Lymph: No edema or adenopathy.  Psych: anxiety and depression she feels  is controlled   Endo: No weight change.    OBJECTIVE:      /74 (BP Location: Left arm, Patient Position: Sitting, BP Method: Medium (Manual))   Pulse 77   Wt 65.8 kg (145 lb)   SpO2 97%   BMI 26.52 kg/m²     Physical Exam  GENERAL: Older patient in no distress.  HEENT: Pupils equal and reactive. Extraocular movements intact. Nose intact.      Pharynx moist. Malampatti 3   NECK: Supple. 13 inches   HEART: Regular rate and rhythm. No murmur or gallop auscultated.  LUNGS: Clear to auscultation and percussion. Lung excursion symmetrical. No change in fremitus. No adventitial noises.  ABDOMEN: Bowel sounds present. Non-tender, no masses palpated.  EXTREMITIES: Normal muscle tone and joint movement, no cyanosis or clubbing.   LYMPHATICS: No adenopathy palpated, no edema.  SKIN: Dry, intact, no lesions.   NEURO: Cranial nerves II-XII intact. Motor strength 5/5 bilaterally, upper and lower extremities.  PSYCH: Appropriate affect.    Assessment:       1. Insomnia, unspecified type    2. Depression, unspecified depression type    3. Forgetfulness    4. Primary insomnia        Ramey score 4  Plan:           Home sleep study    Referral to Psychiatrist for med management for depression and anxiety   Will have follow up with Dr. Marin for Insomnia if no YENNIFER    Follow up in about 4 weeks (around 9/5/2023).

## 2023-08-08 NOTE — PATIENT INSTRUCTIONS
Home sleep study    Referral to Psychiatrist for med management for depression and anxiety   Will have follow up with Dr. Marin

## 2023-08-09 ENCOUNTER — PATIENT MESSAGE (OUTPATIENT)
Dept: PSYCHIATRY | Facility: CLINIC | Age: 78
End: 2023-08-09
Payer: MEDICARE

## 2023-08-14 ENCOUNTER — HOSPITAL ENCOUNTER (EMERGENCY)
Facility: HOSPITAL | Age: 78
Discharge: HOME | End: 2023-08-14
Attending: EMERGENCY MEDICINE
Payer: MEDICARE

## 2023-08-14 ENCOUNTER — TELEPHONE (OUTPATIENT)
Dept: PSYCHIATRY | Facility: CLINIC | Age: 78
End: 2023-08-14
Payer: MEDICARE

## 2023-08-14 VITALS
DIASTOLIC BLOOD PRESSURE: 73 MMHG | RESPIRATION RATE: 19 BRPM | HEIGHT: 62 IN | OXYGEN SATURATION: 96 % | BODY MASS INDEX: 26.68 KG/M2 | HEART RATE: 78 BPM | WEIGHT: 145 LBS | TEMPERATURE: 99 F | SYSTOLIC BLOOD PRESSURE: 153 MMHG

## 2023-08-14 DIAGNOSIS — S09.90XA INJURY OF HEAD, INITIAL ENCOUNTER: ICD-10-CM

## 2023-08-14 DIAGNOSIS — R52 PAIN: ICD-10-CM

## 2023-08-14 DIAGNOSIS — S51.012A ELBOW LACERATION, LEFT, INITIAL ENCOUNTER: ICD-10-CM
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
## 2023-08-14 PROCEDURE — 25000003 PHARM REV CODE 250: Performed by: EMERGENCY MEDICINE

## 2023-08-14 PROCEDURE — 99285 EMERGENCY DEPT VISIT HI MDM: CPT | Mod: 25

## 2023-08-14 PROCEDURE — 12002 RPR S/N/AX/GEN/TRNK2.6-7.5CM: CPT

## 2023-08-14 RX ORDER — HYDROCODONE BITARTRATE AND ACETAMINOPHEN 5; 325 MG/1; MG/1
1 TABLET ORAL EVERY 6 HOURS PRN
Qty: 10 TABLET | Refills: 0 | Status: SHIPPED | OUTPATIENT
Start: 2023-08-14 | End: 2023-08-22

## 2023-08-14 RX ORDER — CEPHALEXIN 500 MG/1
500 CAPSULE ORAL EVERY 12 HOURS
Qty: 10 CAPSULE | Refills: 0 | Status: SHIPPED | OUTPATIENT
Start: 2023-08-14 | End: 2023-08-19

## 2023-08-14 RX ORDER — LIDOCAINE HCL/EPINEPHRINE/PF 2%-1:200K
10 VIAL (ML) INJECTION
Status: COMPLETED | OUTPATIENT
Start: 2023-08-14 | End: 2023-08-14

## 2023-08-14 RX ADMIN — LIDOCAINE HYDROCHLORIDE,EPINEPHRINE BITARTRATE 10 ML: 20; .005 INJECTION, SOLUTION EPIDURAL; INFILTRATION; INTRACAUDAL; PERINEURAL at 01:08

## 2023-08-14 NOTE — TELEPHONE ENCOUNTER
Spoke with patient in regards to medication management referral. Would like to be added to wait list.

## 2023-08-14 NOTE — DISCHARGE INSTRUCTIONS
Staple removal in the next 7 days   Suture removal in the next 14 days with your primary care provider   Head injury precautions for the next 24 hours   Tylenol for mild pain   Norco as directed if severe pain   Return for any concerns of condition becomes worse

## 2023-08-14 NOTE — ED PROVIDER NOTES
Encounter Date: 2023       History     Chief Complaint   Patient presents with    Fall     TRIP AND FALL OVER HUSBANDS OXYGEN TUBING TODAY      Head Injury    Elbow Pain     LEFT     77-year-old female presents emergency department status post mechanical trip and fall.  Patient states that she accidentally tripped over her 's oxygen tubing she did strike her head sustained a small laceration to the left occipital area of her head, has a laceration to the elbow.  Patient does take Eliquis secondary to atrial fibrillation.  Patient denies LOC reports last tetanus was 1 year ago      Review of patient's allergies indicates:  No Known Allergies  Past Medical History:   Diagnosis Date    Anxiety     Constipation     Depression     Insomnia      Past Surgical History:   Procedure Laterality Date    ADENOIDECTOMY      as a child     SECTION      COSMETIC SURGERY      breast augmentation    FRACTURE SURGERY      arm    HIP REPLACEMENT ARTHROPLASTY      JOINT REPLACEMENT      left hip, right knee    REVISION OF TOTAL KNEE REPLACEMENT       TONSILLECTOMY       Family History   Problem Relation Age of Onset    Heart disease Mother     Depression Mother     Arthritis Mother     Hearing loss Father     Arthritis Father     Alcohol abuse Father     Diabetes Maternal Aunt      Social History     Tobacco Use    Smoking status: Never    Smokeless tobacco: Never   Substance Use Topics    Alcohol use: Yes     Alcohol/week: 10.0 standard drinks of alcohol     Types: 10 Glasses of wine per week    Drug use: Never     Review of Systems   Constitutional: Negative.    HENT: Negative.     Eyes: Negative.    Respiratory: Negative.     Cardiovascular: Negative.    Gastrointestinal: Negative.    Endocrine: Negative.    Genitourinary: Negative.    Skin:         Laceration to the left elbow, small laceration to the left occipital area   Neurological: Negative.    All other systems reviewed and are negative.      Physical Exam      Initial Vitals [08/14/23 1016]   BP Pulse Resp Temp SpO2   (!) 153/73 74 18 98.5 °F (36.9 °C) 98 %      MAP       --         Physical Exam    Nursing note and vitals reviewed.  Constitutional: She appears well-developed and well-nourished.   HENT:   Small scalp hematoma to the left occipital area with a small laceration proximally half a cm   Eyes: Conjunctivae and EOM are normal. Pupils are equal, round, and reactive to light.   Neck: Neck supple.   Normal range of motion.  Cardiovascular:  Normal rate, regular rhythm, normal heart sounds and intact distal pulses.           Pulmonary/Chest: Breath sounds normal.   Abdominal: Abdomen is soft.   Musculoskeletal:      Cervical back: Normal range of motion and neck supple.     Neurological: She is alert and oriented to person, place, and time. She has normal strength. GCS score is 15. GCS eye subscore is 4. GCS verbal subscore is 5. GCS motor subscore is 6.   Skin:   4 cm laceration to the left elbow   Psychiatric: She has a normal mood and affect.         ED Course   Lac Repair    Date/Time: 8/14/2023 6:35 PM    Performed by: Balbina Bautista FNP  Authorized by: Alirio Torres MD    Consent:     Consent obtained:  Verbal    Consent given by:  Patient    Risks discussed:  Need for additional repair, nerve damage, infection, pain, poor cosmetic result, poor wound healing, retained foreign body and tendon damage  Anesthesia:     Anesthesia method:  Local infiltration  Laceration details:     Location:  Scalp    Scalp location:  Occipital    Length (cm):  0.5  Pre-procedure details:     Preparation:  Patient was prepped and draped in usual sterile fashion and imaging obtained to evaluate for foreign bodies  Exploration:     Imaging outcome: foreign body not noted      Wound extent: no foreign bodies/material noted    Treatment:     Area cleansed with:  Saline    Amount of cleaning:  Standard    Irrigation solution:  Sterile saline  Skin repair:     Repair method:   Staples    Number of staples:  2  Approximation:     Approximation:  Close  Repair type:     Repair type:  Simple  Post-procedure details:     Procedure completion:  Tolerated well, no immediate complications  Lac Repair    Date/Time: 8/14/2023 6:36 PM    Performed by: Balbina Bautista FNP  Authorized by: Alirio Torres MD    Consent:     Consent obtained:  Verbal    Consent given by:  Patient    Risks discussed:  Infection, need for additional repair, nerve damage, pain, poor cosmetic result, poor wound healing, retained foreign body, tendon damage and vascular damage  Universal protocol:     Patient identity confirmed:  Verbally with patient  Laceration details:     Location:  Shoulder/arm    Shoulder/arm location:  L elbow    Length (cm):  4  Pre-procedure details:     Preparation:  Patient was prepped and draped in usual sterile fashion and imaging obtained to evaluate for foreign bodies  Exploration:     Imaging obtained: x-ray      Imaging outcome: foreign body not noted      Wound exploration: wound explored through full range of motion and entire depth of wound visualized      Wound extent: no areolar tissue violation noted, no fascia violation noted, no foreign bodies/material noted, no muscle damage noted, no nerve damage noted, no tendon damage noted, no underlying fracture noted and no vascular damage noted      Contaminated: no    Treatment:     Amount of cleaning:  Standard    Irrigation solution:  Sterile saline    Irrigation method:  Syringe  Skin repair:     Repair method:  Sutures    Suture size:  5-0    Suture technique:  Simple interrupted    Number of sutures:  10  Approximation:     Approximation:  Close  Repair type:     Repair type:  Simple  Post-procedure details:     Procedure completion:  Tolerated well, no immediate complications    Labs Reviewed - No data to display       Imaging Results              CT Cervical Spine Without Contrast (Final result)  Result time 08/14/23 12:40:33       Final result by Joaquin Pool MD (08/14/23 12:40:33)                   Narrative:    CMS MANDATED QUALITY DATA - CT RADIATION  436    All CT scans at this facility utilize dose modulation, iterative reconstruction, and/or weight based dosing when appropriate to reduce radiation dose to as low as reasonably achievable.    CLINICAL HISTORY:  77 years (1945) Female Neck trauma (Age >= 65y) Fall (TRIP AND FALL OVER HUSBANDS OXYGEN TUBING TODAY; ); Head Injury    TECHNIQUE:  CT CERVICAL SPINE WITHOUT IV CONTRAST. Contiguous thin section axial images were obtained of the cervical spine. Sagittal and coronal reformatted images were generated. Note that this exam is suboptimal for evaluation of disc disease (which would be better evaluated on MRI) and does not assess for ligamentous injury or stability.    COMPARISON:  None available.    FINDINGS:  No acute fracture of the cervical spine. No traumatic malalignment of the cervical spine.  No evidence of significant intraspinal abnormality.  No perched, jumped or locked facets seen. Vertebral body heights are maintained. Scattered degenerative changes present throughout the cervical spine. There is diffusely decreased osseous mineralization (suggesting osteoporosis/osteopenia). Calcified plaques are seen at the level of the carotid bulb/proximal internal carotid arteries. Visualized brain is unremarkable. Visualized lung apices are essentially clear.    IMPRESSION:  No acute fracture or traumatic malalignment the cervical spine.                    .    Electronically signed by:  Joaquin Pool MD  8/14/2023 12:40 PM CDT Workstation: [id number]                                     CT Head Without Contrast (Final result)  Result time 08/14/23 12:39:33      Final result by Joaquin Pool MD (08/14/23 12:39:33)                   Narrative:    CMS MANDATED QUALITY DATA - CT RADIATION 436    All CT scans at this facility utilize dose modulation, iterative  reconstruction, and/or weight based dosing when appropriate to reduce radiation dose to as low as reasonably achievable.    CLINICAL HISTORY:  77 years (1945) Female Head trauma, minor (Age >= 65y) Fall (TRIP AND FALL OVER HUSBANDS OXYGEN TUBING TODAY; ); Head Injury    TECHNIQUE:  CT HEAD WITHOUT IV CONTRAST. Axial CT of the brain without contrast using soft tissue and bone algorithm.    COMPARISON:  CT from January 25, 2022.    FINDINGS:  Focal soft tissue swelling over the left parietal region with no acute intracranial hemorrhage, hydrocephalus, herniation or midline shift and the basal and suprasellar cisterns are patent. No acute skull fracture is identified.    Mild diffuse cerebral atrophy for age with periventricular deep cerebral white matter low attenuation, a nonspecific finding in this age group which can be seen in any diffuse white matter process but which is most commonly associated with chronic microvascular ischemic disease. Punctate defects are seen in the caudate head bilaterally (image 25) suggesting prior lacunar infarcts There are scattered atheromatous calcifications in the intracranial internal carotid arteries.    The orbits appear within normal limits noting likely right lateral lens replacement/cataract surgery. External auditory canals are unremarkable. The visualized paranasal sinuses and mastoid air cells are essentially clear.    IMPRESSION:  No acute intracranial process                  .    Electronically signed by:  Joaquin Pool MD  8/14/2023 12:39 PM CDT Workstation: [id number]                                     X-Ray Elbow Complete Left (Final result)  Result time 08/14/23 12:13:03      Final result by Javier Gilbert MD (08/14/23 12:13:03)                   Narrative:    Reason: Fall; Head Injury; Left Elbow Pain/Laceration    FINDINGS:  3 views of left elbow show no fracture, dislocation, or destructive osseous lesion. Soft tissues are  unremarkable.    IMPRESSION:  Negative left elbow.    Electronically signed by:  Javier Gilbert MD  8/14/2023 12:13 PM CDT Workstation: [id number]                                     Medications   LIDOcaine-EPINEPHrine (PF) 2%-1:200,000 injection 10 mL (10 mLs Intradermal Given 8/14/23 1330)     Medical Decision Making:   History:   Old Records Summarized: records from previous admission(s).  Initial Assessment:   77-year-old female presents emergency department status post mechanical trip and fall.  Patient states that she accidentally tripped over her 's oxygen tubing she did strike her head sustained a small laceration to the left occipital area of her head, has a laceration to the elbow.  Patient does take Eliquis secondary to atrial fibrillation.  Patient denies LOC reports last tetanus was 1 year ago      Differential Diagnosis:   Considerations include closed head injury, intracranial hemorrhage, skull fracture, cervical strain, cervical fracture, open elbow fracture, scalp laceration, laceration to the elbow  Clinical Tests:   Radiological Study: Ordered and Reviewed  ED Management:  77-year-old well-appearing female presents emergency department status post mechanical fall reports that she hit her head sustained a small laceration to the left occipital area she is on Eliquis secondary to AFib hemostasis of the wound was noted patient had a previous subdural hematoma from a fall CT imaging today performed of the head and neck reveals no acute traumatic findings including no evidence of intracranial hemorrhage, skull fracture or cervical fracture.  Imaging of the left elbow revealed no acute fractures.  Patient had staples placed to the wound to the scalp, and tend sutures placed maintaining sterile technique to the left elbow.  Patient was discharged home with prophylactic Keflex she and I discussed head injury precautions she will take Tylenol if needed for mild pain, Norco for severe pain the patient  is aware that she must have head injury precautions for 24 hours I do not advise that she is staying by herself and that someone can wake her up through the night for assessment her family member reports that she will be present.  Patient was given return precautions                          Clinical Impression:   Final diagnoses:  [R52] Pain  [W19.XXXA] Fall, initial encounter (Primary)  [S09.90XA] Injury of head, initial encounter  [S51.012A] Elbow laceration, left, initial encounter        ED Disposition Condition    Discharge Stable          ED Prescriptions       Medication Sig Dispense Start Date End Date Auth. Provider    cephALEXin (KEFLEX) 500 MG capsule Take 1 capsule (500 mg total) by mouth every 12 (twelve) hours. for 5 days 10 capsule 8/14/2023 8/19/2023 Balbina Bautista FNP    HYDROcodone-acetaminophen (NORCO) 5-325 mg per tablet Take 1 tablet by mouth every 6 (six) hours as needed for Pain. 10 tablet 8/14/2023 -- Balbina Bautista FNP          Follow-up Information       Follow up With Specialties Details Why Contact Info    Ela Mays MD Internal Medicine Schedule an appointment as soon as possible for a visit   [address]  [facility] [address]  [phone]               Balbina Bautista FNP  08/14/23 9206

## 2023-08-15 ENCOUNTER — TELEPHONE (OUTPATIENT)
Dept: PRIMARY CARE CLINIC | Facility: CLINIC | Age: 78
End: 2023-08-15

## 2023-08-15 ENCOUNTER — TELEPHONE (OUTPATIENT)
Dept: PRIMARY CARE CLINIC | Facility: CLINIC | Age: 78
End: 2023-08-15
Payer: MEDICARE

## 2023-08-15 ENCOUNTER — PATIENT OUTREACH (OUTPATIENT)
Dept: EMERGENCY MEDICINE | Facility: HOSPITAL | Age: 78
End: 2023-08-15

## 2023-08-15 NOTE — PROGRESS NOTES
Patient agreed to have a post ED 7-day follow up scheduled with PCP. Due to Dr Mays being at the [facility] her scheduled is private, patient also was instructed by the ED to have sutures/staples removed in 9 days. A staff message was sent for scheduling assistance and see if Dr Mays would be able to remove the sutures/staples.

## 2023-08-15 NOTE — TELEPHONE ENCOUNTER
----- Message from Marysol Allison MA sent at 8/15/2023 10:47 AM CDT -----  Regarding: ER follow up/Suture removal  Good morning/afternoon, Pt needs a Post ED 7-day appt. Patient was discharged on 8/14/23 and sutures/staples were placed and patient was advised to have them removed in 9 days. I do not see an appt available within that time frame.  Can you assist in getting this patient scheduled and advise of appt date and time so that I can contact the patient.  Patient would like to know if Dr Mays will be able to remove the sutures/staples during her follow up?

## 2023-08-16 ENCOUNTER — TELEPHONE (OUTPATIENT)
Dept: PRIMARY CARE CLINIC | Facility: CLINIC | Age: 78
End: 2023-08-16
Payer: MEDICARE
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
## 2023-08-16 NOTE — PROGRESS NOTES
Per chart PCP's staff have scheduled a hospital follow up with Dr Mays for 8/22/23 at 3:20 pm. Patient will receive an appointment reminder.

## 2023-08-21 ENCOUNTER — PATIENT OUTREACH (OUTPATIENT)
Dept: EMERGENCY MEDICINE | Facility: HOSPITAL | Age: 78
End: 2023-08-21

## 2023-08-21 NOTE — PROGRESS NOTES
Patient was reminded about her appointment for tomorrow with Cally Dinero SSW at 2:00 p.m. and Ela Mays MD for 3:20 p.m. Pt will be attending both.    Allyson Isbell - (assisting Marysol MCKEON)  [phone]

## 2023-08-22 ENCOUNTER — OFFICE VISIT (OUTPATIENT)
Dept: PRIMARY CARE CLINIC | Facility: CLINIC | Age: 78
End: 2023-08-22
Payer: MEDICARE

## 2023-08-22 ENCOUNTER — CLINICAL SUPPORT (OUTPATIENT)
Dept: PRIMARY CARE CLINIC | Facility: CLINIC | Age: 78
End: 2023-08-22
Payer: MEDICARE

## 2023-08-22 VITALS
DIASTOLIC BLOOD PRESSURE: 72 MMHG | HEIGHT: 62 IN | WEIGHT: 145.5 LBS | BODY MASS INDEX: 26.78 KG/M2 | TEMPERATURE: 99 F | SYSTOLIC BLOOD PRESSURE: 136 MMHG | RESPIRATION RATE: 18 BRPM | OXYGEN SATURATION: 97 % | HEART RATE: 80 BPM

## 2023-08-22 DIAGNOSIS — Z09 HOSPITAL DISCHARGE FOLLOW-UP: Primary | ICD-10-CM
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
## 2023-08-22 DIAGNOSIS — F32.A DEPRESSIVE DISORDER: Primary | ICD-10-CM

## 2023-08-22 DIAGNOSIS — E53.8 VITAMIN B12 DEFICIENCY: ICD-10-CM

## 2023-08-22 DIAGNOSIS — R41.3 MEMORY LOSS: ICD-10-CM
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
## 2023-08-22 PROCEDURE — 3078F PR MOST RECENT DIASTOLIC BLOOD PRESSURE < 80 MM HG: ICD-10-PCS | Mod: HCNC,CPTII,S$GLB, | Performed by: INTERNAL MEDICINE

## 2023-08-22 PROCEDURE — 1157F ADVNC CARE PLAN IN RCRD: CPT | Mod: HCNC,CPTII,S$GLB, | Performed by: INTERNAL MEDICINE

## 2023-08-22 PROCEDURE — 99499 UNLISTED E&M SERVICE: CPT | Mod: HCNC,S$GLB,, | Performed by: SOCIAL WORKER

## 2023-08-22 PROCEDURE — 3075F PR MOST RECENT SYSTOLIC BLOOD PRESS GE 130-139MM HG: ICD-10-PCS | Mod: HCNC,CPTII,S$GLB, | Performed by: INTERNAL MEDICINE

## 2023-08-22 PROCEDURE — 1100F PR PT FALLS ASSESS DOC 2+ FALLS/FALL W/INJURY/YR: ICD-10-PCS | Mod: HCNC,CPTII,S$GLB, | Performed by: INTERNAL MEDICINE

## 2023-08-22 PROCEDURE — 99215 PR OFFICE/OUTPT VISIT, EST, LEVL V, 40-54 MIN: ICD-10-PCS | Mod: 25,HCNC,S$GLB, | Performed by: INTERNAL MEDICINE

## 2023-08-22 PROCEDURE — 3288F PR FALLS RISK ASSESSMENT DOCUMENTED: ICD-10-PCS | Mod: HCNC,CPTII,S$GLB, | Performed by: INTERNAL MEDICINE

## 2023-08-22 PROCEDURE — 1160F PR REVIEW ALL MEDS BY PRESCRIBER/CLIN PHARMACIST DOCUMENTED: ICD-10-PCS | Mod: HCNC,CPTII,S$GLB, | Performed by: INTERNAL MEDICINE

## 2023-08-22 PROCEDURE — 99999 PR PBB SHADOW E&M-EST. PATIENT-LVL V: CPT | Mod: PBBFAC,HCNC,, | Performed by: INTERNAL MEDICINE

## 2023-08-22 PROCEDURE — 1159F PR MEDICATION LIST DOCUMENTED IN MEDICAL RECORD: ICD-10-PCS | Mod: HCNC,CPTII,S$GLB, | Performed by: INTERNAL MEDICINE

## 2023-08-22 PROCEDURE — 99999 PR PBB SHADOW E&M-EST. PATIENT-LVL V: ICD-10-PCS | Mod: PBBFAC,HCNC,, | Performed by: INTERNAL MEDICINE

## 2023-08-22 PROCEDURE — 1157F PR ADVANCE CARE PLAN OR EQUIV PRESENT IN MEDICAL RECORD: ICD-10-PCS | Mod: HCNC,CPTII,S$GLB, | Performed by: INTERNAL MEDICINE

## 2023-08-22 PROCEDURE — 99499 NO LOS: ICD-10-PCS | Mod: HCNC,S$GLB,, | Performed by: SOCIAL WORKER

## 2023-08-22 PROCEDURE — 1100F PTFALLS ASSESS-DOCD GE2>/YR: CPT | Mod: HCNC,CPTII,S$GLB, | Performed by: INTERNAL MEDICINE

## 2023-08-22 PROCEDURE — 96372 THER/PROPH/DIAG INJ SC/IM: CPT | Mod: HCNC,S$GLB,, | Performed by: INTERNAL MEDICINE

## 2023-08-22 PROCEDURE — 1126F AMNT PAIN NOTED NONE PRSNT: CPT | Mod: HCNC,CPTII,S$GLB, | Performed by: INTERNAL MEDICINE

## 2023-08-22 PROCEDURE — 1159F MED LIST DOCD IN RCRD: CPT | Mod: HCNC,CPTII,S$GLB, | Performed by: INTERNAL MEDICINE

## 2023-08-22 PROCEDURE — 1126F PR PAIN SEVERITY QUANTIFIED, NO PAIN PRESENT: ICD-10-PCS | Mod: HCNC,CPTII,S$GLB, | Performed by: INTERNAL MEDICINE

## 2023-08-22 PROCEDURE — 1160F RVW MEDS BY RX/DR IN RCRD: CPT | Mod: HCNC,CPTII,S$GLB, | Performed by: INTERNAL MEDICINE

## 2023-08-22 PROCEDURE — 3075F SYST BP GE 130 - 139MM HG: CPT | Mod: HCNC,CPTII,S$GLB, | Performed by: INTERNAL MEDICINE

## 2023-08-22 PROCEDURE — 96372 PR INJECTION,THERAP/PROPH/DIAG2ST, IM OR SUBCUT: ICD-10-PCS | Mod: HCNC,S$GLB,, | Performed by: INTERNAL MEDICINE

## 2023-08-22 PROCEDURE — 3288F FALL RISK ASSESSMENT DOCD: CPT | Mod: HCNC,CPTII,S$GLB, | Performed by: INTERNAL MEDICINE

## 2023-08-22 PROCEDURE — 99215 OFFICE O/P EST HI 40 MIN: CPT | Mod: 25,HCNC,S$GLB, | Performed by: INTERNAL MEDICINE

## 2023-08-22 PROCEDURE — 3078F DIAST BP <80 MM HG: CPT | Mod: HCNC,CPTII,S$GLB, | Performed by: INTERNAL MEDICINE

## 2023-08-22 RX ORDER — CYANOCOBALAMIN 1000 UG/ML
1000 INJECTION, SOLUTION INTRAMUSCULAR; SUBCUTANEOUS
Status: SHIPPED | OUTPATIENT
Start: 2023-08-23 | End: 2023-11-21

## 2023-08-22 RX ADMIN — CYANOCOBALAMIN 1000 MCG: 1000 INJECTION, SOLUTION INTRAMUSCULAR; SUBCUTANEOUS at 03:08
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
## 2023-08-29 ENCOUNTER — LAB VISIT (OUTPATIENT)
Dept: LAB | Facility: HOSPITAL | Age: 78
End: 2023-08-29
Attending: INTERNAL MEDICINE
Payer: MEDICARE

## 2023-08-29 ENCOUNTER — PROCEDURE VISIT (OUTPATIENT)
Dept: SLEEP MEDICINE | Facility: HOSPITAL | Age: 78
End: 2023-08-29
Attending: NURSE PRACTITIONER
Payer: MEDICARE

## 2023-08-29 DIAGNOSIS — R68.89 FORGETFULNESS: ICD-10-CM

## 2023-08-29 DIAGNOSIS — G47.00 INSOMNIA, UNSPECIFIED TYPE: ICD-10-CM

## 2023-08-29 DIAGNOSIS — I10 PRIMARY HYPERTENSION: ICD-10-CM

## 2023-08-29 DIAGNOSIS — E53.8 VITAMIN B12 DEFICIENCY: ICD-10-CM

## 2023-08-29 DIAGNOSIS — F51.01 PRIMARY INSOMNIA: ICD-10-CM

## 2023-08-29 DIAGNOSIS — D69.2 SENILE PURPURA: ICD-10-CM

## 2023-08-29 DIAGNOSIS — E66.3 OVERWEIGHT (BMI 25.0-29.9): ICD-10-CM

## 2023-08-29 LAB
ALBUMIN SERPL BCP-MCNC: 3.8 G/DL (ref 3.5–5.2)
ALP SERPL-CCNC: 56 U/L (ref 55–135)
ALT SERPL W/O P-5'-P-CCNC: 14 U/L (ref 10–44)
ANION GAP SERPL CALC-SCNC: 10 MMOL/L (ref 8–16)
AST SERPL-CCNC: 16 U/L (ref 10–40)
BASOPHILS # BLD AUTO: 0.03 K/UL (ref 0–0.2)
BASOPHILS NFR BLD: 0.6 % (ref 0–1.9)
BILIRUB SERPL-MCNC: 0.6 MG/DL (ref 0.1–1)
BUN SERPL-MCNC: 10 MG/DL (ref 8–23)
CALCIUM SERPL-MCNC: 9.4 MG/DL (ref 8.7–10.5)
CHLORIDE SERPL-SCNC: 102 MMOL/L (ref 95–110)
CHOLEST SERPL-MCNC: 141 MG/DL (ref 120–199)
CHOLEST/HDLC SERPL: 2.4 {RATIO} (ref 2–5)
CO2 SERPL-SCNC: 27 MMOL/L (ref 23–29)
CREAT SERPL-MCNC: 0.7 MG/DL (ref 0.5–1.4)
DIFFERENTIAL METHOD: ABNORMAL
EOSINOPHIL # BLD AUTO: 0.1 K/UL (ref 0–0.5)
EOSINOPHIL NFR BLD: 2.5 % (ref 0–8)
ERYTHROCYTE [DISTWIDTH] IN BLOOD BY AUTOMATED COUNT: 14.8 % (ref 11.5–14.5)
EST. GFR  (NO RACE VARIABLE): >60 ML/MIN/1.73 M^2
GLUCOSE SERPL-MCNC: 87 MG/DL (ref 70–110)
HCT VFR BLD AUTO: 42.3 % (ref 37–48.5)
HDLC SERPL-MCNC: 58 MG/DL (ref 40–75)
HDLC SERPL: 41.1 % (ref 20–50)
HGB BLD-MCNC: 13.6 G/DL (ref 12–16)
IMM GRANULOCYTES # BLD AUTO: 0.02 K/UL (ref 0–0.04)
IMM GRANULOCYTES NFR BLD AUTO: 0.4 % (ref 0–0.5)
LDLC SERPL CALC-MCNC: 69 MG/DL (ref 63–159)
LYMPHOCYTES # BLD AUTO: 1.2 K/UL (ref 1–4.8)
LYMPHOCYTES NFR BLD: 22.3 % (ref 18–48)
MCH RBC QN AUTO: 30 PG (ref 27–31)
MCHC RBC AUTO-ENTMCNC: 32.2 G/DL (ref 32–36)
MCV RBC AUTO: 93 FL (ref 82–98)
MONOCYTES # BLD AUTO: 0.6 K/UL (ref 0.3–1)
MONOCYTES NFR BLD: 10.8 % (ref 4–15)
NEUTROPHILS # BLD AUTO: 3.3 K/UL (ref 1.8–7.7)
NEUTROPHILS NFR BLD: 63.4 % (ref 38–73)
NONHDLC SERPL-MCNC: 83 MG/DL
NRBC BLD-RTO: 0 /100 WBC
PLATELET # BLD AUTO: 209 K/UL (ref 150–450)
PMV BLD AUTO: 10.4 FL (ref 9.2–12.9)
POTASSIUM SERPL-SCNC: 4.2 MMOL/L (ref 3.5–5.1)
PROT SERPL-MCNC: 6.5 G/DL (ref 6–8.4)
RBC # BLD AUTO: 4.53 M/UL (ref 4–5.4)
SODIUM SERPL-SCNC: 139 MMOL/L (ref 136–145)
TRIGL SERPL-MCNC: 70 MG/DL (ref 30–150)
TSH SERPL DL<=0.005 MIU/L-ACNC: 3.23 UIU/ML (ref 0.4–4)
VIT B12 SERPL-MCNC: 1109 PG/ML (ref 210–950)
WBC # BLD AUTO: 5.2 K/UL (ref 3.9–12.7)

## 2023-08-29 PROCEDURE — 85025 COMPLETE CBC W/AUTO DIFF WBC: CPT | Mod: HCNC | Performed by: INTERNAL MEDICINE

## 2023-08-29 PROCEDURE — 80061 LIPID PANEL: CPT | Mod: HCNC | Performed by: INTERNAL MEDICINE

## 2023-08-29 PROCEDURE — 82607 VITAMIN B-12: CPT | Mod: HCNC | Performed by: INTERNAL MEDICINE

## 2023-08-29 PROCEDURE — 80053 COMPREHEN METABOLIC PANEL: CPT | Mod: HCNC | Performed by: INTERNAL MEDICINE

## 2023-08-29 PROCEDURE — 36415 COLL VENOUS BLD VENIPUNCTURE: CPT | Mod: HCNC,PO | Performed by: INTERNAL MEDICINE

## 2023-08-29 PROCEDURE — 95806 SLEEP STUDY UNATT&RESP EFFT: CPT

## 2023-08-29 PROCEDURE — 84443 ASSAY THYROID STIM HORMONE: CPT | Mod: HCNC | Performed by: INTERNAL MEDICINE

## 2023-08-30 ENCOUNTER — TELEPHONE (OUTPATIENT)
Dept: PRIMARY CARE CLINIC | Facility: CLINIC | Age: 78
End: 2023-08-30
Payer: MEDICARE

## 2023-08-30 NOTE — PROGRESS NOTES
CBC is now normal. Vitamin b12 very normal now. So may discontinue the injections and start taking an oral t ab. Will recheck in 6 months and if declined will restart the injections

## 2023-08-30 NOTE — TELEPHONE ENCOUNTER
----- Message from Ela Mays MD sent at 8/30/2023  8:12 AM CDT -----  CBC is now normal. Vitamin b12 very normal now. So may discontinue the injections and start taking an oral t ab. Will recheck in 6 months and if declined will restart the injections

## 2023-09-01 ENCOUNTER — TELEPHONE (OUTPATIENT)
Dept: PULMONOLOGY | Facility: CLINIC | Age: 78
End: 2023-09-01

## 2023-09-01 DIAGNOSIS — G47.33 OSA (OBSTRUCTIVE SLEEP APNEA): Primary | ICD-10-CM

## 2023-09-01 NOTE — TELEPHONE ENCOUNTER
Called patient she said she would like face to face appt. With you before ordering Auto CPAP.  Told her I would give Ashley th message.  She verbalized an understanding.

## 2023-09-06 ENCOUNTER — TELEPHONE (OUTPATIENT)
Dept: PRIMARY CARE CLINIC | Facility: CLINIC | Age: 78
End: 2023-09-06
Payer: MEDICARE

## 2023-09-06 NOTE — TELEPHONE ENCOUNTER
Left message for pt requesting a call back at earliest convenience. Need to reschedule pt's appt and change it to an AWV.

## 2023-09-11 ENCOUNTER — TELEPHONE (OUTPATIENT)
Dept: PRIMARY CARE CLINIC | Facility: CLINIC | Age: 78
End: 2023-09-11

## 2023-09-12 ENCOUNTER — OFFICE VISIT (OUTPATIENT)
Dept: PULMONOLOGY | Facility: CLINIC | Age: 78
End: 2023-09-12
Payer: MEDICARE

## 2023-09-12 VITALS
SYSTOLIC BLOOD PRESSURE: 122 MMHG | BODY MASS INDEX: 27.44 KG/M2 | HEART RATE: 89 BPM | DIASTOLIC BLOOD PRESSURE: 78 MMHG | OXYGEN SATURATION: 95 % | WEIGHT: 150 LBS

## 2023-09-12 DIAGNOSIS — G47.33 OSA (OBSTRUCTIVE SLEEP APNEA): ICD-10-CM

## 2023-09-12 DIAGNOSIS — G47.00 INSOMNIA, UNSPECIFIED TYPE: Primary | ICD-10-CM

## 2023-09-12 PROCEDURE — 3074F PR MOST RECENT SYSTOLIC BLOOD PRESSURE < 130 MM HG: ICD-10-PCS | Mod: CPTII,S$GLB,, | Performed by: INTERNAL MEDICINE

## 2023-09-12 PROCEDURE — 1126F PR PAIN SEVERITY QUANTIFIED, NO PAIN PRESENT: ICD-10-PCS | Mod: CPTII,S$GLB,, | Performed by: INTERNAL MEDICINE

## 2023-09-12 PROCEDURE — 3078F PR MOST RECENT DIASTOLIC BLOOD PRESSURE < 80 MM HG: ICD-10-PCS | Mod: CPTII,S$GLB,, | Performed by: INTERNAL MEDICINE

## 2023-09-12 PROCEDURE — 99214 PR OFFICE/OUTPT VISIT, EST, LEVL IV, 30-39 MIN: ICD-10-PCS | Mod: S$GLB,,, | Performed by: INTERNAL MEDICINE

## 2023-09-12 PROCEDURE — 1157F PR ADVANCE CARE PLAN OR EQUIV PRESENT IN MEDICAL RECORD: ICD-10-PCS | Mod: CPTII,S$GLB,, | Performed by: INTERNAL MEDICINE

## 2023-09-12 PROCEDURE — 99214 OFFICE O/P EST MOD 30 MIN: CPT | Mod: S$GLB,,, | Performed by: INTERNAL MEDICINE

## 2023-09-12 PROCEDURE — 1126F AMNT PAIN NOTED NONE PRSNT: CPT | Mod: CPTII,S$GLB,, | Performed by: INTERNAL MEDICINE

## 2023-09-12 PROCEDURE — 3078F DIAST BP <80 MM HG: CPT | Mod: CPTII,S$GLB,, | Performed by: INTERNAL MEDICINE

## 2023-09-12 PROCEDURE — 3074F SYST BP LT 130 MM HG: CPT | Mod: CPTII,S$GLB,, | Performed by: INTERNAL MEDICINE

## 2023-09-12 PROCEDURE — 1157F ADVNC CARE PLAN IN RCRD: CPT | Mod: CPTII,S$GLB,, | Performed by: INTERNAL MEDICINE

## 2023-09-12 NOTE — PROGRESS NOTES
SUBJECTIVE:    Patient ID: Madisyn Velasquez is a 77 y.o. female.    Chief Complaint: Follow-up (Questions abut sleep study)    HPI    The patient was first seen for insomnia.  She has severe sleep onset insomnia and its controlled with Klonopin. She had a sleep study which showed an NICOL of 10.  The patient is familiar with BiPAP as her  is dying of pulmonary fibrosis.  We reviewed her home sleep study which showed 85 hypopneas, no apneas.  Her lowest sat is recorded at 40%, this is obviously inaccurate.  The patient does not think she could sleep on CPAP.  Since her YENNIFER is mild, we will not force the issue.  We discussed the association of depression and YENNIFER.  We discussed the association of obstructive sleep apnea and AFib.  She assures me she has not had atrial fibrillation since beginning sotalol.  The patient is still looking for a psychiatrist.  Past Medical History:   Diagnosis Date    Anxiety     Constipation     Depression     Insomnia      Past Surgical History:   Procedure Laterality Date    ADENOIDECTOMY      as a child     SECTION      COSMETIC SURGERY      breast augmentation    FRACTURE SURGERY      arm    HIP REPLACEMENT ARTHROPLASTY      JOINT REPLACEMENT      left hip, right knee    REVISION OF TOTAL KNEE REPLACEMENT       TONSILLECTOMY       Family History   Problem Relation Age of Onset    Heart disease Mother     Depression Mother     Arthritis Mother     Hearing loss Father     Arthritis Father     Alcohol abuse Father     Diabetes Maternal Aunt         Social History:   Marital Status:   Occupation: Data Unavailable  Alcohol History:  reports current alcohol use of about 10.0 standard drinks of alcohol per week.  Tobacco History:  reports that she has never smoked. She has never used smokeless tobacco.  Drug History:  reports no history of drug use.    Review of patient's allergies indicates:  No Known Allergies    Current Outpatient Medications   Medication Sig Dispense  Refill    ARIPiprazole (ABILIFY) 2 MG Tab TAKE 1 TABLET(2 MG) BY MOUTH EVERY DAY 90 tablet 0    baclofen (LIORESAL) 10 MG tablet Take 20 mg by mouth every evening.      buPROPion (WELLBUTRIN XL) 300 MG 24 hr tablet Take 1 tablet (300 mg total) by mouth once daily. 30 tablet 11    DULoxetine (CYMBALTA) 60 MG capsule Take 60 mg by mouth every morning.      ELIQUIS 5 mg Tab Take 5 mg by mouth 2 (two) times daily.      EScitalopram oxalate (LEXAPRO) 20 MG tablet TAKE 1 TABLET(20 MG) BY MOUTH EVERY DAY 90 tablet 1    ibuprofen (ADVIL,MOTRIN) 200 MG tablet ibuprofen 200 mg tablet   Take 1 tablet 4 times a day by oral route.      metronidazole 0.75% (METROCREAM) 0.75 % Crea Apply topically nightly as needed.      mirabegron (MYRBETRIQ) 25 mg Tb24 ER tablet TAKE 1 TABLET(25 MG) BY MOUTH EVERY DAY 90 tablet 0    rosuvastatin (CRESTOR) 10 MG tablet Take 1 tablet (10 mg total) by mouth once daily. 30 tablet 3    sotaloL (BETAPACE) 80 MG tablet Take 40 mg by mouth 2 (two) times daily.      celecoxib (CELEBREX) 100 MG capsule Celebrex       Current Facility-Administered Medications   Medication Dose Route Frequency Provider Last Rate Last Admin    cyanocobalamin injection 1,000 mcg  1,000 mcg Intramuscular Q30 Days Ela Mays MD   1,000 mcg at 07/10/23 1509    cyanocobalamin injection 1,000 mcg  1,000 mcg Intramuscular Q30 Days Ela Mays MD   1,000 mcg at 08/22/23 1539           Review of Systems  General:  occasional naps during the day. Chronic fatigue   Eyes: Vision is good.  ENT:  No sinusitis or pharyngitis.   Heart:: history of atrial fib   Lungs: No cough, sputum, or wheezing.  GI:  reflux.  : wakes up once a night to urinate   Musculoskeletal: No joint pain or myalgias.  Skin: No lesions or rashes.  Neuro: forgetfulness  Lymph: No edema or adenopathy.  Psych: anxiety and depression she feels  is controlled   Endo: No weight change.    OBJECTIVE:      /78 (BP Location: Right arm, Patient  Position: Sitting, BP Method: Medium (Manual))   Pulse 89   Wt 68 kg (150 lb)   SpO2 95%   BMI 27.44 kg/m²     Physical Exam  GENERAL: Older patient in no distress.  HEENT: Pupils equal and reactive. Extraocular movements intact. Nose intact.      Pharynx moist. Malampatti 3   NECK: Supple. 13 inches   HEART: Regular rate and rhythm. No murmur or gallop auscultated.  LUNGS: Clear to auscultation and percussion. Lung excursion symmetrical. No change in fremitus. No adventitial noises.  ABDOMEN: Bowel sounds present. Non-tender, no masses palpated.  EXTREMITIES: Normal muscle tone and joint movement, no cyanosis or clubbing.   LYMPHATICS: No adenopathy palpated, no edema.  SKIN: Dry, intact, no lesions.   NEURO: Cranial nerves II-XII intact. Motor strength 5/5 bilaterally, upper and lower extremities.  PSYCH: Appropriate affect.    Assessment:       1. Insomnia, unspecified type    2. YENNIFER (obstructive sleep apnea)          Pala score 4  Plan:             Follow up in about 6 months (around 3/12/2024).  Will prescribe Klonopin when she needs a refill, 0.5mg  She will let me know if she would like to try an autopap

## 2023-09-14 DIAGNOSIS — F33.1 MODERATE EPISODE OF RECURRENT MAJOR DEPRESSIVE DISORDER: Primary | ICD-10-CM

## 2023-09-14 RX ORDER — DULOXETIN HYDROCHLORIDE 60 MG/1
60 CAPSULE, DELAYED RELEASE ORAL EVERY MORNING
Qty: 90 CAPSULE | Refills: 2 | Status: SHIPPED | OUTPATIENT
Start: 2023-09-14 | End: 2024-02-29

## 2023-09-14 NOTE — TELEPHONE ENCOUNTER
----- Message from Radha Jericho sent at 9/14/2023  9:49 AM CDT -----  Contact: Self  Type:  RX Refill Request    Who Called:  Self  Refill or New Rx:  New Rx from Dr Mays, Refill from old   RX Name and Strength:  DULoxetine (CYMBALTA) 60 MG capsule   How is the patient currently taking it? (ex. 1XDay):  Take 60 mg by mouth every morning. - Oral   Is this a 30 day or 90 day RX:  30  Preferred Pharmacy with phone number:    [id number] - [place], [place] - [address] AT [facility] [address]  [address]  [place] [address]  Phone: [phone] Fax: [phone]  Local or Mail Order:  Local  Ordering Provider:  Davon Elizabeth Call Back Number:  [phone]  Additional Information:  Please call the pt back to advise. Thanks!

## 2023-09-19 ENCOUNTER — PATIENT MESSAGE (OUTPATIENT)
Dept: PRIMARY CARE CLINIC | Facility: CLINIC | Age: 78
End: 2023-09-19
Payer: MEDICARE

## 2023-09-26 ENCOUNTER — TELEPHONE (OUTPATIENT)
Dept: PRIMARY CARE CLINIC | Facility: CLINIC | Age: 78
End: 2023-09-26
Payer: MEDICARE

## 2023-10-02 ENCOUNTER — CLINICAL SUPPORT (OUTPATIENT)
Dept: PRIMARY CARE CLINIC | Facility: CLINIC | Age: 78
End: 2023-10-02
Payer: MEDICARE

## 2023-10-02 DIAGNOSIS — Z63.6 CAREGIVER STRESS: ICD-10-CM

## 2023-10-02 DIAGNOSIS — F41.1 GAD (GENERALIZED ANXIETY DISORDER): Primary | ICD-10-CM

## 2023-10-02 PROCEDURE — 99499 UNLISTED E&M SERVICE: CPT | Mod: HCNC,S$GLB,, | Performed by: SOCIAL WORKER

## 2023-10-02 PROCEDURE — 99499 NO LOS: ICD-10-PCS | Mod: HCNC,S$GLB,, | Performed by: SOCIAL WORKER

## 2023-10-02 SDOH — SOCIAL DETERMINANTS OF HEALTH (SDOH): DEPENDENT RELATIVE NEEDING CARE AT HOME: Z63.6
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
## 2023-10-16 RX ORDER — ARIPIPRAZOLE 2 MG/1
TABLET ORAL
Qty: 90 TABLET | Refills: 1 | Status: SHIPPED | OUTPATIENT
Start: 2023-10-16 | End: 2024-01-16

## 2023-10-17 RX ORDER — ESCITALOPRAM OXALATE 20 MG/1
TABLET ORAL
Qty: 90 TABLET | Refills: 1 | Status: SHIPPED | OUTPATIENT
Start: 2023-10-17 | End: 2024-04-03 | Stop reason: SDUPTHER

## 2023-11-10 ENCOUNTER — TELEPHONE (OUTPATIENT)
Dept: PULMONOLOGY | Facility: CLINIC | Age: 78
End: 2023-11-10

## 2023-11-10 NOTE — TELEPHONE ENCOUNTER
----- Message from Pasquale Ballard sent at 11/10/2023 10:45 AM CST -----  Regarding: Prescription Request  Contact: Madisyn  The patient called requesting a prescription of clonazepam .5mg sublingual HS, to be called in to her [id number] on file in epic.  Please assist and thanks in advance for your assistance.

## 2023-11-13 ENCOUNTER — TELEPHONE (OUTPATIENT)
Dept: PULMONOLOGY | Facility: HOSPITAL | Age: 78
End: 2023-11-13

## 2023-11-13 ENCOUNTER — TELEPHONE (OUTPATIENT)
Dept: PRIMARY CARE CLINIC | Facility: CLINIC | Age: 78
End: 2023-11-13
Payer: MEDICARE

## 2023-11-13 DIAGNOSIS — R35.0 URINARY FREQUENCY: ICD-10-CM

## 2023-11-13 DIAGNOSIS — K59.00 CONSTIPATION, UNSPECIFIED CONSTIPATION TYPE: ICD-10-CM

## 2023-11-13 DIAGNOSIS — N39.3 STRESS INCONTINENCE: ICD-10-CM

## 2023-11-13 DIAGNOSIS — N39.41 URGE INCONTINENCE: ICD-10-CM

## 2023-11-13 DIAGNOSIS — R39.15 URINARY URGENCY: ICD-10-CM

## 2023-11-13 DIAGNOSIS — G47.00 INSOMNIA, UNSPECIFIED TYPE: Primary | ICD-10-CM

## 2023-11-13 RX ORDER — MIRABEGRON 25 MG/1
TABLET, FILM COATED, EXTENDED RELEASE ORAL
Qty: 90 TABLET | Refills: 0 | Status: SHIPPED | OUTPATIENT
Start: 2023-11-13 | End: 2024-02-07

## 2023-11-13 RX ORDER — CLONAZEPAM 0.5 MG/1
0.5 TABLET, ORALLY DISINTEGRATING ORAL NIGHTLY PRN
Qty: 30 TABLET | Refills: 5 | Status: SHIPPED | OUTPATIENT
Start: 2023-11-13 | End: 2024-11-12

## 2023-11-13 NOTE — TELEPHONE ENCOUNTER
Returned call to pt, she asked to schedule an appointment. She is scheduled on 11/27/2023 at 11:00 with clinician.

## 2023-11-27 ENCOUNTER — CLINICAL SUPPORT (OUTPATIENT)
Dept: PRIMARY CARE CLINIC | Facility: CLINIC | Age: 78
End: 2023-11-27
Payer: MEDICARE

## 2023-11-27 DIAGNOSIS — F32.A DEPRESSIVE DISORDER: Primary | ICD-10-CM

## 2023-11-27 DIAGNOSIS — Z63.6 CAREGIVER STRESS: ICD-10-CM

## 2023-11-27 PROBLEM — Z09 HOSPITAL DISCHARGE FOLLOW-UP: Status: RESOLVED | Noted: 2023-08-22 | Resolved: 2023-11-27

## 2023-11-27 PROCEDURE — 99499 UNLISTED E&M SERVICE: CPT | Mod: HCNC,S$GLB,, | Performed by: SOCIAL WORKER

## 2023-11-27 PROCEDURE — 99499 NO LOS: ICD-10-PCS | Mod: HCNC,S$GLB,, | Performed by: SOCIAL WORKER

## 2023-11-27 SDOH — SOCIAL DETERMINANTS OF HEALTH (SDOH): DEPENDENT RELATIVE NEEDING CARE AT HOME: Z63.6
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
## 2023-12-08 ENCOUNTER — OFFICE VISIT (OUTPATIENT)
Dept: PRIMARY CARE CLINIC | Facility: CLINIC | Age: 78
End: 2023-12-08
Payer: MEDICARE

## 2023-12-08 VITALS
HEIGHT: 62 IN | SYSTOLIC BLOOD PRESSURE: 126 MMHG | HEART RATE: 62 BPM | BODY MASS INDEX: 28.34 KG/M2 | DIASTOLIC BLOOD PRESSURE: 72 MMHG | WEIGHT: 154 LBS | OXYGEN SATURATION: 98 % | RESPIRATION RATE: 18 BRPM

## 2023-12-08 DIAGNOSIS — Z00.00 ENCOUNTER FOR MEDICARE ANNUAL WELLNESS EXAM: Primary | ICD-10-CM

## 2023-12-08 DIAGNOSIS — H91.90 HEARING LOSS, UNSPECIFIED HEARING LOSS TYPE, UNSPECIFIED LATERALITY: ICD-10-CM

## 2023-12-08 PROCEDURE — 1159F MED LIST DOCD IN RCRD: CPT | Mod: HCNC,CPTII,S$GLB, | Performed by: INTERNAL MEDICINE

## 2023-12-08 PROCEDURE — 1123F PR ADV CARE PLAN DISCUSSED, PLAN OR SURROGATE DOCUMENTED: ICD-10-PCS | Mod: HCNC,CPTII,S$GLB, | Performed by: INTERNAL MEDICINE

## 2023-12-08 PROCEDURE — 99999 PR PBB SHADOW E&M-EST. PATIENT-LVL IV: ICD-10-PCS | Mod: PBBFAC,HCNC,, | Performed by: INTERNAL MEDICINE

## 2023-12-08 PROCEDURE — 1101F PR PT FALLS ASSESS DOC 0-1 FALLS W/OUT INJ PAST YR: ICD-10-PCS | Mod: HCNC,CPTII,S$GLB, | Performed by: INTERNAL MEDICINE

## 2023-12-08 PROCEDURE — 3078F PR MOST RECENT DIASTOLIC BLOOD PRESSURE < 80 MM HG: ICD-10-PCS | Mod: HCNC,CPTII,S$GLB, | Performed by: INTERNAL MEDICINE

## 2023-12-08 PROCEDURE — 1126F AMNT PAIN NOTED NONE PRSNT: CPT | Mod: HCNC,CPTII,S$GLB, | Performed by: INTERNAL MEDICINE

## 2023-12-08 PROCEDURE — 1170F FXNL STATUS ASSESSED: CPT | Mod: HCNC,CPTII,S$GLB, | Performed by: INTERNAL MEDICINE

## 2023-12-08 PROCEDURE — 99397 PR PREVENTIVE VISIT,EST,65 & OVER: ICD-10-PCS | Mod: HCNC,S$GLB,, | Performed by: INTERNAL MEDICINE

## 2023-12-08 PROCEDURE — 99397 PER PM REEVAL EST PAT 65+ YR: CPT | Mod: HCNC,S$GLB,, | Performed by: INTERNAL MEDICINE

## 2023-12-08 PROCEDURE — 1160F RVW MEDS BY RX/DR IN RCRD: CPT | Mod: HCNC,CPTII,S$GLB, | Performed by: INTERNAL MEDICINE

## 2023-12-08 PROCEDURE — 1170F PR FUNCTIONAL STATUS ASSESSED: ICD-10-PCS | Mod: HCNC,CPTII,S$GLB, | Performed by: INTERNAL MEDICINE

## 2023-12-08 PROCEDURE — 99999 PR PBB SHADOW E&M-EST. PATIENT-LVL IV: CPT | Mod: PBBFAC,HCNC,, | Performed by: INTERNAL MEDICINE

## 2023-12-08 PROCEDURE — 1123F ACP DISCUSS/DSCN MKR DOCD: CPT | Mod: HCNC,CPTII,S$GLB, | Performed by: INTERNAL MEDICINE

## 2023-12-08 PROCEDURE — 1101F PT FALLS ASSESS-DOCD LE1/YR: CPT | Mod: HCNC,CPTII,S$GLB, | Performed by: INTERNAL MEDICINE

## 2023-12-08 PROCEDURE — 3288F FALL RISK ASSESSMENT DOCD: CPT | Mod: HCNC,CPTII,S$GLB, | Performed by: INTERNAL MEDICINE

## 2023-12-08 PROCEDURE — 1160F PR REVIEW ALL MEDS BY PRESCRIBER/CLIN PHARMACIST DOCUMENTED: ICD-10-PCS | Mod: HCNC,CPTII,S$GLB, | Performed by: INTERNAL MEDICINE

## 2023-12-08 PROCEDURE — 3074F PR MOST RECENT SYSTOLIC BLOOD PRESSURE < 130 MM HG: ICD-10-PCS | Mod: HCNC,CPTII,S$GLB, | Performed by: INTERNAL MEDICINE

## 2023-12-08 PROCEDURE — 1159F PR MEDICATION LIST DOCUMENTED IN MEDICAL RECORD: ICD-10-PCS | Mod: HCNC,CPTII,S$GLB, | Performed by: INTERNAL MEDICINE

## 2023-12-08 PROCEDURE — 1126F PR PAIN SEVERITY QUANTIFIED, NO PAIN PRESENT: ICD-10-PCS | Mod: HCNC,CPTII,S$GLB, | Performed by: INTERNAL MEDICINE

## 2023-12-08 PROCEDURE — 3078F DIAST BP <80 MM HG: CPT | Mod: HCNC,CPTII,S$GLB, | Performed by: INTERNAL MEDICINE

## 2023-12-08 PROCEDURE — 3288F PR FALLS RISK ASSESSMENT DOCUMENTED: ICD-10-PCS | Mod: HCNC,CPTII,S$GLB, | Performed by: INTERNAL MEDICINE

## 2023-12-08 PROCEDURE — 3074F SYST BP LT 130 MM HG: CPT | Mod: HCNC,CPTII,S$GLB, | Performed by: INTERNAL MEDICINE

## 2023-12-08 RX ORDER — NALTREXONE HYDROCHLORIDE AND BUPROPION HYDROCHLORIDE 8; 90 MG/1; MG/1
1 TABLET, EXTENDED RELEASE ORAL DAILY
Qty: 90 TABLET | Refills: 0 | Status: SHIPPED | OUTPATIENT
Start: 2023-12-08

## 2023-12-08 NOTE — PROGRESS NOTES
"  Madisyn Velasquez presented for a follow-up Medicare AWV today. The following components were reviewed and updated:    Medical history  Family History  Social history  Allergies and Current Medications  Health Risk Assessment  Health Maintenance  Care Team    **See Completed Assessments for Annual Wellness visit with in the encounter summary    The following assessments were completed:  Depression Screening  Cognitive function Screening  Timed Get Up Test  Whisper Test      Opioid documentation:      Patient does not have a current opioid prescription.          Vitals:    12/08/23 1303   BP: 126/72   BP Location: Left arm   Patient Position: Sitting   BP Method: Medium (Manual)   Pulse: 62   Resp: 18   SpO2: 98%   Weight: 69.8 kg (153 lb 15.9 oz)   Height: 5' 2" (1.575 m)     Body mass index is 28.17 kg/m².       Advance Care Planning     Date: 12/08/2023    Power of   I initiated the process of voluntary advance care planning today and explained the importance of this process to the patient.  I introduced the concept of advance directives to the patient, as well. Then the patient received detailed information about the importance of designating a Health Care Power of  (HCPOA). She was also instructed to communicate with this person about their wishes for future healthcare, should she become sick and lose decision-making capacity. The patient has previously appointed a HCPOA. After our discussion, the patient has decided to complete a HCPOA and has appointed her son, health care agent:  on file  & health care agent number:  on valery  I spent a total time of 10 minutes discussing this issue with the patient.            Physical Exam  Constitutional:       Appearance: Normal appearance.   HENT:      Head: Normocephalic and atraumatic.      Right Ear: Tympanic membrane and ear canal normal.      Left Ear: Tympanic membrane normal.      Nose: Nose normal.      Mouth/Throat:      Mouth: Mucous membranes are " dry.   Eyes:      Extraocular Movements: Extraocular movements intact.      Conjunctiva/sclera: Conjunctivae normal.      Pupils: Pupils are equal, round, and reactive to light.   Cardiovascular:      Rate and Rhythm: Normal rate and regular rhythm.   Pulmonary:      Effort: Pulmonary effort is normal.      Breath sounds: Normal breath sounds.   Abdominal:      General: Abdomen is flat. Bowel sounds are normal.      Palpations: Abdomen is soft.   Musculoskeletal:         General: Normal range of motion.      Cervical back: Normal range of motion and neck supple.   Neurological:      Mental Status: She is alert.       Physical Exam  Constitutional:       Appearance: Normal appearance.   HENT:      Head: Normocephalic and atraumatic.      Right Ear: Tympanic membrane and ear canal normal.      Left Ear: Tympanic membrane normal.      Nose: Nose normal.      Mouth/Throat:      Mouth: Mucous membranes are dry.   Eyes:      Extraocular Movements: Extraocular movements intact.      Conjunctiva/sclera: Conjunctivae normal.      Pupils: Pupils are equal, round, and reactive to light.   Cardiovascular:      Rate and Rhythm: Normal rate and regular rhythm.   Pulmonary:      Effort: Pulmonary effort is normal.      Breath sounds: Normal breath sounds.   Abdominal:      General: Abdomen is flat. Bowel sounds are normal.      Palpations: Abdomen is soft.   Musculoskeletal:         General: Normal range of motion.      Cervical back: Normal range of motion and neck supple.   Neurological:      Mental Status: She is alert.          Diagnoses and health risks identified today and associated recommendations/orders:  1. Encounter for Medicare annual wellness exam  -medications reviewed and consolidated  -reviewed PMH, medications, HCM including vaccinations.   -reviewed most recent lab work. Reordered as needed. Discussed abnormalities with patient with time allowed for questions.   -reviewed clinic policy with patient including to  arrive 15 mins before appointments, labs and results including expected turn around for results.   -outside records and consultants notes reviewed as time allowed. Updated treatment team with name of other providers.   -reviewed and confirmed patients contact information, preferred pharmacy etc.   -AVS provided after visit to summarized things discussed.   -The following assessments were completed:  Living Situation  CAGE  Depression Screening  Timed Get Up and Go  Cognitive Function Screening-Minicog   Nutrition Screening  ADL Screening      2. Hearing loss, unspecified hearing loss type, unspecified laterality  - Ambulatory referral/consult to ENT; Future  - Ambulatory referral/consult to Audiology; Future      Provided Madisyn with a 5-10 year written screening schedule and personal prevention plan. Recommendations were developed using the USPSTF age appropriate recommendations. Education, counseling, and referrals were provided as needed.  After Visit Summary printed and given to patient which includes a list of additional screenings\tests needed.    No follow-ups on file.      Ela Mays MD

## 2023-12-11 ENCOUNTER — TELEPHONE (OUTPATIENT)
Dept: AUDIOLOGY | Facility: CLINIC | Age: 78
End: 2023-12-11
Payer: MEDICARE

## 2023-12-11 NOTE — TELEPHONE ENCOUNTER
----- Message from Tari Miller sent at 12/8/2023  3:08 PM CST -----  Regarding: Needs to be scheduled  Good afternoon,    This patient has an order in to get an audiogram. She would like to be seen in [place]. Please contact the patient to get her scheduled.    Thank you,  Tari

## 2023-12-19 ENCOUNTER — CLINICAL SUPPORT (OUTPATIENT)
Dept: AUDIOLOGY | Facility: CLINIC | Age: 78
End: 2023-12-19
Payer: MEDICARE

## 2023-12-19 DIAGNOSIS — H91.90 HEARING LOSS, UNSPECIFIED HEARING LOSS TYPE, UNSPECIFIED LATERALITY: ICD-10-CM

## 2023-12-19 PROCEDURE — 99499 UNLISTED E&M SERVICE: CPT | Mod: HCNC,S$GLB,, | Performed by: AUDIOLOGIST

## 2023-12-19 PROCEDURE — 99499 NO LOS: ICD-10-PCS | Mod: HCNC,S$GLB,, | Performed by: AUDIOLOGIST

## 2024-01-16 ENCOUNTER — TELEPHONE (OUTPATIENT)
Dept: OTOLARYNGOLOGY | Facility: CLINIC | Age: 79
End: 2024-01-16
Payer: MEDICARE

## 2024-01-16 RX ORDER — ARIPIPRAZOLE 2 MG/1
TABLET ORAL
Qty: 90 TABLET | Refills: 1 | Status: SHIPPED | OUTPATIENT
Start: 2024-01-16 | End: 2024-05-16 | Stop reason: DRUGHIGH

## 2024-01-16 NOTE — TELEPHONE ENCOUNTER
Called pt. Apologized and let pt know that Dr. Sin would not be in clinic today. R.S to Thursday afternoon. Thanks, Annika

## 2024-02-06 ENCOUNTER — TELEPHONE (OUTPATIENT)
Dept: PRIMARY CARE CLINIC | Facility: CLINIC | Age: 79
End: 2024-02-06
Payer: MEDICARE

## 2024-02-07 DIAGNOSIS — R39.15 URINARY URGENCY: ICD-10-CM

## 2024-02-07 DIAGNOSIS — K59.00 CONSTIPATION, UNSPECIFIED CONSTIPATION TYPE: ICD-10-CM

## 2024-02-07 DIAGNOSIS — N39.41 URGE INCONTINENCE: ICD-10-CM

## 2024-02-07 DIAGNOSIS — N39.3 STRESS INCONTINENCE: ICD-10-CM

## 2024-02-07 DIAGNOSIS — R35.0 URINARY FREQUENCY: ICD-10-CM

## 2024-02-07 RX ORDER — MIRABEGRON 25 MG/1
TABLET, FILM COATED, EXTENDED RELEASE ORAL
Qty: 90 TABLET | Refills: 0 | Status: SHIPPED | OUTPATIENT
Start: 2024-02-07 | End: 2024-05-02

## 2024-02-29 DIAGNOSIS — F33.1 MODERATE EPISODE OF RECURRENT MAJOR DEPRESSIVE DISORDER: ICD-10-CM

## 2024-02-29 RX ORDER — DULOXETIN HYDROCHLORIDE 60 MG/1
CAPSULE, DELAYED RELEASE ORAL
Qty: 90 CAPSULE | Refills: 3 | Status: SHIPPED | OUTPATIENT
Start: 2024-02-29 | End: 2024-05-16 | Stop reason: SDUPTHER

## 2024-03-11 PROBLEM — Z00.00 ENCOUNTER FOR MEDICARE ANNUAL WELLNESS EXAM: Status: RESOLVED | Noted: 2023-12-08 | Resolved: 2024-03-11

## 2024-03-12 ENCOUNTER — OFFICE VISIT (OUTPATIENT)
Dept: PULMONOLOGY | Facility: CLINIC | Age: 79
End: 2024-03-12
Payer: MEDICARE

## 2024-03-12 VITALS
WEIGHT: 155 LBS | DIASTOLIC BLOOD PRESSURE: 80 MMHG | BODY MASS INDEX: 28.52 KG/M2 | HEIGHT: 62 IN | HEART RATE: 85 BPM | SYSTOLIC BLOOD PRESSURE: 145 MMHG | OXYGEN SATURATION: 96 %

## 2024-03-12 DIAGNOSIS — G47.00 INSOMNIA, UNSPECIFIED TYPE: Primary | ICD-10-CM

## 2024-03-12 PROCEDURE — 1157F ADVNC CARE PLAN IN RCRD: CPT | Mod: CPTII,S$GLB,, | Performed by: INTERNAL MEDICINE

## 2024-03-12 PROCEDURE — 1126F AMNT PAIN NOTED NONE PRSNT: CPT | Mod: CPTII,S$GLB,, | Performed by: INTERNAL MEDICINE

## 2024-03-12 PROCEDURE — 3077F SYST BP >= 140 MM HG: CPT | Mod: CPTII,S$GLB,, | Performed by: INTERNAL MEDICINE

## 2024-03-12 PROCEDURE — 99213 OFFICE O/P EST LOW 20 MIN: CPT | Mod: S$GLB,,, | Performed by: INTERNAL MEDICINE

## 2024-03-12 PROCEDURE — 1159F MED LIST DOCD IN RCRD: CPT | Mod: CPTII,S$GLB,, | Performed by: INTERNAL MEDICINE

## 2024-03-12 PROCEDURE — 3079F DIAST BP 80-89 MM HG: CPT | Mod: CPTII,S$GLB,, | Performed by: INTERNAL MEDICINE

## 2024-03-12 RX ORDER — HYDROCODONE BITARTRATE AND ACETAMINOPHEN 10; 325 MG/1; MG/1
1-2 TABLET ORAL EVERY 4 HOURS PRN
COMMUNITY
Start: 2024-02-07 | End: 2024-05-20

## 2024-03-12 NOTE — PROGRESS NOTES
SUBJECTIVE:    Patient ID: Madisyn Velasquez is a 78 y.o. female.    Chief Complaint: Insomnia    HPI    The patient is here with her  dying and now getting confused. She is doing Klonopin 0.5 mg and it it helping greatly.  She recently fell and broke her L wrist and bruised up her knee.  She is getting treatment for her depression.    Past Medical History:   Diagnosis Date    Anxiety     Constipation     Depression     Insomnia      Past Surgical History:   Procedure Laterality Date    ADENOIDECTOMY      as a child     SECTION      COSMETIC SURGERY      breast augmentation    FRACTURE SURGERY      arm    HIP REPLACEMENT ARTHROPLASTY      JOINT REPLACEMENT      left hip, right knee    REVISION OF TOTAL KNEE REPLACEMENT       TONSILLECTOMY       Family History   Problem Relation Age of Onset    Heart disease Mother     Depression Mother     Arthritis Mother     Hearing loss Father     Arthritis Father     Alcohol abuse Father     Diabetes Maternal Aunt         Social History:   Marital Status:   Occupation: Data Unavailable  Alcohol History:  reports current alcohol use of about 10.0 standard drinks of alcohol per week.  Tobacco History:  reports that she has never smoked. She has never used smokeless tobacco.  Drug History:  reports no history of drug use.    Review of patient's allergies indicates:  No Known Allergies    Current Outpatient Medications   Medication Sig Dispense Refill    ARIPiprazole (ABILIFY) 2 MG Tab TAKE 1 TABLET(2 MG) BY MOUTH EVERY DAY 90 tablet 1    buPROPion (WELLBUTRIN XL) 300 MG 24 hr tablet Take 1 tablet (300 mg total) by mouth once daily. 30 tablet 11    celecoxib (CELEBREX) 100 MG capsule Celebrex      clonazePAM (KLONOPIN) 0.5 MG disintegrating tablet Take 1 tablet (0.5 mg total) by mouth nightly as needed (insomnia). 30 tablet 5    DULoxetine (CYMBALTA) 60 MG capsule TAKE 1 CAPSULE(60 MG) BY MOUTH EVERY MORNING 90 capsule 3    ELIQUIS 5 mg Tab Take 5 mg by mouth 2  "(two) times daily.      EScitalopram oxalate (LEXAPRO) 20 MG tablet TAKE 1 TABLET(20 MG) BY MOUTH EVERY DAY 90 tablet 1    HYDROcodone-acetaminophen (NORCO)  mg per tablet Take 1-2 tablets by mouth every 4 (four) hours as needed.      ibuprofen (ADVIL,MOTRIN) 200 MG tablet ibuprofen 200 mg tablet   Take 1 tablet 4 times a day by oral route.      metronidazole 0.75% (METROCREAM) 0.75 % Crea Apply topically nightly as needed.      MYRBETRIQ 25 mg Tb24 ER tablet TAKE 1 TABLET(25 MG) BY MOUTH EVERY DAY 90 tablet 0    rosuvastatin (CRESTOR) 10 MG tablet Take 1 tablet (10 mg total) by mouth once daily. 30 tablet 3    sotaloL (BETAPACE) 80 MG tablet Take 40 mg by mouth 2 (two) times daily.      baclofen (LIORESAL) 10 MG tablet Take 20 mg by mouth every evening.      naltrexone-bupropion (CONTRAVE) 8-90 mg TbSR Take 1 tablet by mouth once daily. (Patient not taking: Reported on 3/12/2024) 90 tablet 0     No current facility-administered medications for this visit.           Review of Systems  General:  occasional naps during the day. Doing much better with Klonopin.  Eyes: Vision is good.  ENT:  No sinusitis or pharyngitis.   Heart:: history of atrial fib   Lungs: No cough, sputum, or wheezing.  GI:  reflux.  : wakes up once a night to urinate   Musculoskeletal: No joint pain or myalgias.  Skin: No lesions or rashes.  Neuro: forgetfulness  Lymph: No edema or adenopathy.  Psych: anxiety and depression she feels  is controlled   Endo: No weight change.    OBJECTIVE:      BP (!) 145/80 (BP Location: Right arm, Patient Position: Sitting, BP Method: Medium (Manual))   Pulse 85   Ht 5' 2" (1.575 m)   Wt 70.3 kg (155 lb)   SpO2 96%   BMI 28.35 kg/m²     Physical Exam  GENERAL: Older patient in no distress.  HEENT: Pupils equal and reactive. Extraocular movements intact. Nose intact.      Pharynx moist. [place]atti 3   NECK: Supple. 13 inches   HEART: Regular rate and rhythm. No murmur or gallop auscultated.  LUNGS: " Clear to auscultation and percussion. Lung excursion symmetrical. No change in fremitus. No adventitial noises.  ABDOMEN: Bowel sounds present. Non-tender, no masses palpated.  EXTREMITIES: Normal muscle tone and joint movement, no cyanosis or clubbing.   LYMPHATICS: No adenopathy palpated, no edema.  SKIN: Dry, intact, no lesions.   NEURO: Cranial nerves II-XII intact. Motor strength 5/5 bilaterally, upper and lower extremities.  PSYCH: Appropriate affect.    Assessment:       1. Insomnia, unspecified type      Doing well with Klonopin      [place] score 4  Plan:             No follow-ups on file.  Will prescribe Klonopin when she needs a refill, 0.5mg

## 2024-04-03 ENCOUNTER — TELEPHONE (OUTPATIENT)
Dept: PULMONOLOGY | Facility: CLINIC | Age: 79
End: 2024-04-03

## 2024-04-03 DIAGNOSIS — G47.00 INSOMNIA, UNSPECIFIED TYPE: Primary | ICD-10-CM

## 2024-04-03 RX ORDER — CLONAZEPAM 0.5 MG/1
0.5 TABLET ORAL 2 TIMES DAILY
Qty: 60 TABLET | Refills: 5 | Status: SHIPPED | OUTPATIENT
Start: 2024-04-03 | End: 2024-05-16

## 2024-04-03 RX ORDER — ESCITALOPRAM OXALATE 20 MG/1
20 TABLET ORAL DAILY
Qty: 90 TABLET | Refills: 3 | Status: SHIPPED | OUTPATIENT
Start: 2024-04-03 | End: 2024-05-16

## 2024-04-03 NOTE — TELEPHONE ENCOUNTER
----- Message from Mckayla Christine Malate sent at 4/3/2024 10:38 AM CDT -----  Regarding: refill needed  [phone] - CALL BACK ; REFILL NEEDED    EScitalopram oxalate (LEXAPRO) 20 MG tablet    Send to:    [facility] [address] [address]  [phone]

## 2024-04-03 NOTE — TELEPHONE ENCOUNTER
pt called stating she only has two klonopin left on rx since her  passed away she stated she has been taking one more than prescribed. She wants to know if you can up her dose for now. She is going to stay with her kids out of state Friday.     She is prescribed to take one at bedtime but she said she has been taking one during the day (even taking half at a time during the day) and then the one at night.  So its too soon to refill her current prescription I think unless you change the rx.

## 2024-04-03 NOTE — TELEPHONE ENCOUNTER
Spoke with the patient about her need for increased Klonopin at this point.  She is taking 2 q.h.s. to go to sleep.  Advised that we do not want to continue doing that but in the short time that will be fine.  Will rewrite her prescription.

## 2024-05-01 DIAGNOSIS — R35.0 URINARY FREQUENCY: ICD-10-CM

## 2024-05-01 DIAGNOSIS — N39.3 STRESS INCONTINENCE: ICD-10-CM

## 2024-05-01 DIAGNOSIS — K59.00 CONSTIPATION, UNSPECIFIED CONSTIPATION TYPE: ICD-10-CM

## 2024-05-01 DIAGNOSIS — R39.15 URINARY URGENCY: ICD-10-CM

## 2024-05-01 DIAGNOSIS — N39.41 URGE INCONTINENCE: ICD-10-CM

## 2024-05-02 RX ORDER — MIRABEGRON 25 MG/1
TABLET, FILM COATED, EXTENDED RELEASE ORAL
Qty: 90 TABLET | Refills: 1 | Status: SHIPPED | OUTPATIENT
Start: 2024-05-02

## 2024-05-16 ENCOUNTER — OFFICE VISIT (OUTPATIENT)
Dept: PSYCHIATRY | Facility: CLINIC | Age: 79
End: 2024-05-16
Payer: MEDICARE

## 2024-05-16 VITALS
HEIGHT: 62 IN | BODY MASS INDEX: 28.97 KG/M2 | WEIGHT: 157.44 LBS | DIASTOLIC BLOOD PRESSURE: 94 MMHG | SYSTOLIC BLOOD PRESSURE: 144 MMHG | HEART RATE: 87 BPM

## 2024-05-16 DIAGNOSIS — F33.0 MILD EPISODE OF RECURRENT MAJOR DEPRESSIVE DISORDER: Primary | ICD-10-CM

## 2024-05-16 DIAGNOSIS — F41.9 ANXIETY DISORDER, UNSPECIFIED TYPE: ICD-10-CM

## 2024-05-16 DIAGNOSIS — F43.21 GRIEF: ICD-10-CM

## 2024-05-16 DIAGNOSIS — G47.00 INSOMNIA, UNSPECIFIED TYPE: ICD-10-CM

## 2024-05-16 PROCEDURE — 1159F MED LIST DOCD IN RCRD: CPT | Mod: HCNC,CPTII,S$GLB, | Performed by: STUDENT IN AN ORGANIZED HEALTH CARE EDUCATION/TRAINING PROGRAM

## 2024-05-16 PROCEDURE — 99999 PR PBB SHADOW E&M-EST. PATIENT-LVL IV: CPT | Mod: PBBFAC,HCNC,, | Performed by: STUDENT IN AN ORGANIZED HEALTH CARE EDUCATION/TRAINING PROGRAM

## 2024-05-16 PROCEDURE — 1160F RVW MEDS BY RX/DR IN RCRD: CPT | Mod: HCNC,CPTII,S$GLB, | Performed by: STUDENT IN AN ORGANIZED HEALTH CARE EDUCATION/TRAINING PROGRAM

## 2024-05-16 PROCEDURE — 1157F ADVNC CARE PLAN IN RCRD: CPT | Mod: HCNC,CPTII,S$GLB, | Performed by: STUDENT IN AN ORGANIZED HEALTH CARE EDUCATION/TRAINING PROGRAM

## 2024-05-16 PROCEDURE — 90792 PSYCH DIAG EVAL W/MED SRVCS: CPT | Mod: HCNC,S$GLB,, | Performed by: STUDENT IN AN ORGANIZED HEALTH CARE EDUCATION/TRAINING PROGRAM

## 2024-05-16 PROCEDURE — 1100F PTFALLS ASSESS-DOCD GE2>/YR: CPT | Mod: HCNC,CPTII,S$GLB, | Performed by: STUDENT IN AN ORGANIZED HEALTH CARE EDUCATION/TRAINING PROGRAM

## 2024-05-16 PROCEDURE — 3288F FALL RISK ASSESSMENT DOCD: CPT | Mod: HCNC,CPTII,S$GLB, | Performed by: STUDENT IN AN ORGANIZED HEALTH CARE EDUCATION/TRAINING PROGRAM

## 2024-05-16 PROCEDURE — 3080F DIAST BP >= 90 MM HG: CPT | Mod: HCNC,CPTII,S$GLB, | Performed by: STUDENT IN AN ORGANIZED HEALTH CARE EDUCATION/TRAINING PROGRAM

## 2024-05-16 PROCEDURE — 1126F AMNT PAIN NOTED NONE PRSNT: CPT | Mod: HCNC,CPTII,S$GLB, | Performed by: STUDENT IN AN ORGANIZED HEALTH CARE EDUCATION/TRAINING PROGRAM

## 2024-05-16 PROCEDURE — 3077F SYST BP >= 140 MM HG: CPT | Mod: HCNC,CPTII,S$GLB, | Performed by: STUDENT IN AN ORGANIZED HEALTH CARE EDUCATION/TRAINING PROGRAM

## 2024-05-16 RX ORDER — UBIDECARENONE 30 MG
30 CAPSULE ORAL 3 TIMES DAILY
COMMUNITY

## 2024-05-16 RX ORDER — CELECOXIB 200 MG/1
200 CAPSULE ORAL
COMMUNITY

## 2024-05-16 RX ORDER — CLONAZEPAM 0.5 MG/1
.5-1 TABLET, ORALLY DISINTEGRATING ORAL NIGHTLY PRN
Qty: 30 TABLET | Refills: 1 | Status: SHIPPED | OUTPATIENT
Start: 2024-05-16 | End: 2024-07-15

## 2024-05-16 RX ORDER — DULOXETIN HYDROCHLORIDE 60 MG/1
60 CAPSULE, DELAYED RELEASE ORAL EVERY MORNING
Qty: 30 CAPSULE | Refills: 1 | Status: SHIPPED | OUTPATIENT
Start: 2024-05-16 | End: 2024-06-17 | Stop reason: DRUGHIGH

## 2024-05-16 RX ORDER — BUPROPION HYDROCHLORIDE 300 MG/1
300 TABLET ORAL DAILY
Qty: 30 TABLET | Refills: 1 | Status: SHIPPED | OUTPATIENT
Start: 2024-05-16 | End: 2024-06-17 | Stop reason: SDUPTHER

## 2024-05-16 RX ORDER — ARIPIPRAZOLE 5 MG/1
5 TABLET ORAL DAILY
Qty: 30 TABLET | Refills: 1 | Status: SHIPPED | OUTPATIENT
Start: 2024-05-16 | End: 2024-06-17 | Stop reason: SDUPTHER

## 2024-05-16 NOTE — PROGRESS NOTES
"    Outpatient Psychiatry Initial Visit (DO/MD/NP, etc.)  PSYCHIATRIC EVALUATION ("EVAL")    2024  Subjective      Referral from:  Taylor Dniero, P  [address] [facility] [address]  [place] [address]    History of Present Illness (HPI) & Review of Symptoms (ROS):     Madisyn Velasquez, a 78 y.o. female, presenting for initial evaluation visit.     Chart was reviewed. Available documentation has been reviewed, and pertinent elements of the chart have been incorporated into this note where appropriate.     General     Pt says, "My internist doesn't feel comfortable with managing my depression medicines" and requests medication management. She reports symptoms of her anxiety and depression had been mostly stable until her  of 30 years  in .     Pt described grief.    Pt says, "I don't think my depression medications have been working since COVID. I had to shut down interactions with people during COVID and lost a lot of self confidence." Pt reports since engaging more socially lately, she is actually feeling a little bit better.    Pt is sad with crying spells, anhedonia, guilt, low energy, increased appetite and sleep disturbances. See details below.    Pt is also taking KLONOPIN for insomnia, since . Reviewed risks of using a benzo at her age, including fall risks. Discussed sleep hygiene.    Pt is open to re-engaging in psychotherapy and increasing ABILIFY to better address depression symptoms.     Personal Functioning   Stress Loneliness. "I wish I could exercise." Grief. See PSS4 below.   Mood Mood is sad, with crying spells. POSITIVE AFFECT Structures: ANHEDONIA noted. Diminished ability to achieve pleasure and purpose in things. Consummatory pleasure REDUCED. NEGATIVE AFFECT and DMN Structures: Feeling both GUILT and HOPELESSNESS without worthlessness. RUMINATION and NEGATIVE BIAS noted. PERSONAL-INTERPERSONAL Functioning: Energy is LOW. Socialization intact. See instruments below. " "  Anxiety RUMINATION: wnl. SALIENCE/APPREHENSION: Pt described anxious arousal and apprehension. Amygdala-Centered Circuit: THREAT DYSREGULATION: Pt denies recent panic attacks with classic symptoms. See instruments below.   Sleep Pt reports sleep as fair overall. Sleep onset is 30 mins or less. Duration is sufficient with 1 or 2 interruptions due to bathroom needs and spontaneous awakenings. "I don't get out of the bed when I should." Upon awakening in the morning, pt does NOT leave bed for at least an hour. Goes to bed early, in the afternoon. Pt reports the following before bed: KLONOPIN 0.5-1mg HS (since 2005).   Cognition No issues with concentration. Memory is worse than usual.    Appetite and Nutrition Pt reports increased appetite.   Safety Patient did not display signs of nor endorse symptoms of overt psychosis or acute mood disorder requiring hospitalization during the encounter. Patient denied violent thoughts or suicidal or homicidal ideation, intent, or plan.   Suicide Risk Suicide risk assessment performed. Pt denies suicidal ideation, intent or plan. Pt has never attempted suicide in the past. Risk factors include anxiety, depression, and grief. Protective factors include wanting to live for the family and receptivity to treatment. Suicide risk at this time is LOW. Pt agrees to safety. No safety concerns identified at this time.    Interpersonal Functioning   Home Grief.   Peers Socializing more. Reduced social drive.   Work Retired.     History:     Psychiatric History - Diagnostic   Reported Diagnoses anxiety, depression   Formal Testing NO   Symptom History General Psychosis: No history of psychosis.  PANIC ATTACKS: Positive history of a single panic attack 30ya.  Mood cycles: No episodes of hypomania, mayelin or mixed mood episodes.  DEPRESSION: Recurrent with multiple depression episodes, onset 2005.  ANXIETY: Symptoms onset COVID pandemic.    Suicide Attempts NO    Self Harm NO   Psychiatric " "History - Treatment   Inpatient Treatment NONE   ACT, IOP, PHP NONE   Outpatient  Psychotherapy About 6ma   Outpatient   Psych Med Mgmt Last time in late 1960s   Medications Prior Psychotropics CLINTONLIFALVIN, HUGHIEN (somnambulism), ATARAX/VISTARIL, CYMBALTA, DEXEDRINE, KLONOPIN, LEXAPRO, TRAZODONE, WELLBUTRIN XL    Current Psychotropics ABILIFY, CYMBALTA, KLONOPIN, WELLBUTRIN XL    Psychoactive Agents BETA BLOCKER: SOTALOL (depressant and cognitive burden)   Personal Psychosocial History   Childhood and  Adolescence Born 1st of 2 children. Raised by parents. Overall childhood was "good." No abuse, no serious illnesses and no injuries reported in childhood   Marital Status  twice,  since MAR2024 (30y marriage)   Children Two.   Residence lives alone   Occupation retired pediatric nurse   Hobbies & Interests Crafting, gardening   Evangelical Practice NO   Education History BSN    History Deferred.   Legal History Deferred.   Firearm Access NO   Abuse History NO   Trauma History NO   Sexual History Deferred.   Family History    1st Degree 2nd Degree 3rd+ Degree   Suicides NO NO Niece's son   Substance Abuse NO NO NO   Alcohol Abuse father NO NO   Bipolar Disorder NO NO NO   Anxiety NO NO NO   Depression parents, sister NO NO   Other/Medical hoarding disorder (sister), dementia, diabetes   Substance History   Alcohol 1-2 D/d, some Dd/w, no 6+D/d in a year.   Tobacco and Nicotine NEVER   Caffeine LOW, 2 coffees daily or less, tea   Marijuana 0/4: No use within a year.   Other Recreational Substances NO   Detox/rehab NO   Medical History   Past Medical History:   Diagnosis Date    Anxiety     Constipation     Depression     Insomnia       Active Problem List with Overview Notes    Diagnosis Date Noted    Grief 05/16/2024    Hearing loss 12/08/2023    Fall 08/22/2023    Stress incontinence 06/12/2023    Vitamin B12 deficiency 03/06/2023    Forgetfulness 09/08/2022    Primary insomnia 09/08/2022    Senile " "purpura 2022    Insomnia 2022    BMI 28.0-28.9,adult 2022    Anxiety disorder, unspecified 2022    Primary hypertension 2022     BP is well controlled.   Continue current therapy      Polypharmacy 2022    Cerebral atrophy, mild 2022     Found on CT from 3/22 (went to ER after a fall, found to have a SAH which self resolved). MRI and CT both showed "mild volume loss", consistent with age related cerebral atrophy.       Mild episode of recurrent major depressive disorder 2022    Hyponatremia 2022    Anticoagulated by anticoagulation treatment 2022    Acute traumatic left frontotemporal subdural hematoma, 6 mm 2022    Fall at home, initial encounter 2022    pAF 2022    Acute traumatic left frontal SAH 2022    Gastroesophageal reflux disease without esophagitis 2022     Well controlled       Hyperlipidemia 2022       HS with LOC Two falls with SDH   Seizure NO   Surgical History   Past Surgical History:   Procedure Laterality Date    ADENOIDECTOMY      as a child     SECTION      COSMETIC SURGERY      breast augmentation    FRACTURE SURGERY      arm    HIP REPLACEMENT ARTHROPLASTY      JOINT REPLACEMENT      left hip, right knee    REVISION OF TOTAL KNEE REPLACEMENT       TONSILLECTOMY         Objective    Measurement-Based Care (MBC):     Routine Evaluation Instruments   GAD7 Interpretation: ; NEGATIVE using 5-10-15 cutoffs. The GAD7 has acceptable properties for identifying ELIAS at cutoff scores 7 to 10; a cutoff score of 10 is fairly sensitive (0.8) but not specific (0.4).   PHQ9 Interpretation: ; MILD using 7-15-21 cutoffs, but there tends to be significant variability in sensitivity of cutoff scores between 7 and 15. The PHQ-9 was found to have acceptable diagnostic properties for screening for MDD for cut-off scores between 8 and 11. PHQ-9 is useful for screening, but not always for diagnosis of a " "current epsiode of MDD in a psychiatric specialty clinic; according to the literature the optimal cutoff score for a current episode of MDD is most reliably 13 or 14.   SHI Interpretation: 1/6, SCREENED NEGATIVE   PSS4 Interpretation: 8/16; MODERATE using 6-11 cutoffs. 0 PH, 0 LSE.   Additional Instruments   Cognitive testing may be administered at a future encounter     Current Evaluation of Mental Status:     Nutritional Screening  "Considering the patient's height and weight, medications, medical history and preferences, should a referral be made to the dietitian?" not at this time   Constitutional       Vitals:    05/16/24 1409   BP: (!) 144/94   Pulse: 87   Weight: 71.4 kg (157 lb 6.5 oz)   Height: 5' 2" (1.575 m)     General:  casual dress, unhealthy weight; overweight (BMI 25.0 - 29.9)    Psychiatric / Mental Status Examination  1. Appearance: Dress is informal but appropriate. Motor activity normal.  2. Discourse: Clear speech with normal rate and volume. Associations intact. Orderly.  3. Emotional Expression: Somewhat anxious and depressed mood. Affect is appropriate.  4. Perception and Thinking: No hallucinations. No suicidality, no homicidality, delusions, or paranoia.  5. Sensorium: Grossly intact. Able to focus for interview.  6. Memory and Fund of Knowledge: Intact for content of interview.  7. Insight and Judgment: Intact.    Neurological / Musculoskeletal / Osteopathic Structural  Gait, Station:  Uses cane.     Auto-populated Chart Data:     Medications  Outpatient Encounter Medications as of 5/16/2024   Medication Sig Dispense Refill    celecoxib (CELEBREX) 200 MG capsule Take 200 mg by mouth.      co-enzyme Q-10 30 mg capsule Take 30 mg by mouth 3 (three) times daily.      ELIQUIS 5 mg Tab Take 5 mg by mouth 2 (two) times daily.      metronidazole 0.75% (METROCREAM) 0.75 % Crea Apply topically nightly as needed.      MYRBETRIQ 25 mg Tb24 ER tablet TAKE 1 TABLET(25 MG) BY MOUTH EVERY DAY (Patient " taking differently: Take 50 mg by mouth once daily.) 90 tablet 1    rosuvastatin (CRESTOR) 10 MG tablet Take 1 tablet (10 mg total) by mouth once daily. 30 tablet 3    sotaloL (BETAPACE) 80 MG tablet Take 40 mg by mouth 2 (two) times daily.      [DISCONTINUED] ARIPiprazole (ABILIFY) 2 MG Tab TAKE 1 TABLET(2 MG) BY MOUTH EVERY DAY 90 tablet 1    [DISCONTINUED] buPROPion (WELLBUTRIN XL) 300 MG 24 hr tablet Take 1 tablet (300 mg total) by mouth once daily. 30 tablet 11    [DISCONTINUED] clonazePAM (KLONOPIN) 0.5 MG disintegrating tablet Take 1 tablet (0.5 mg total) by mouth nightly as needed (insomnia). 30 tablet 5    [DISCONTINUED] DULoxetine (CYMBALTA) 60 MG capsule TAKE 1 CAPSULE(60 MG) BY MOUTH EVERY MORNING 90 capsule 3    [DISCONTINUED] ibuprofen (ADVIL,MOTRIN) 200 MG tablet ibuprofen 200 mg tablet   Take 1 tablet 4 times a day by oral route.      ARIPiprazole (ABILIFY) 5 MG Tab Take 1 tablet (5 mg total) by mouth once daily. 30 tablet 1    buPROPion (WELLBUTRIN XL) 300 MG 24 hr tablet Take 1 tablet (300 mg total) by mouth once daily. 30 tablet 1    clonazePAM (KLONOPIN) 0.5 MG disintegrating tablet Take 1-2 tablets (0.5-1 mg total) by mouth nightly as needed (insomnia). 30 tablet 1    DULoxetine (CYMBALTA) 60 MG capsule Take 1 capsule (60 mg total) by mouth every morning. 30 capsule 1    [DISCONTINUED] celecoxib (CELEBREX) 100 MG capsule Celebrex      [DISCONTINUED] clonazePAM (KLONOPIN) 0.5 MG tablet Take 1 tablet (0.5 mg total) by mouth 2 (two) times a day. (Patient not taking: Reported on 5/16/2024) 60 tablet 5    [DISCONTINUED] EScitalopram oxalate (LEXAPRO) 20 MG tablet Take 1 tablet (20 mg total) by mouth once daily. (Patient not taking: Reported on 5/16/2024) 90 tablet 3    [DISCONTINUED] HYDROcodone-acetaminophen (NORCO)  mg per tablet Take 1-2 tablets by mouth every 4 (four) hours as needed. (Patient not taking: Reported on 5/16/2024)      [DISCONTINUED] naltrexone-bupropion (CONTRAVE) 8-90 mg  TbSR Take 1 tablet by mouth once daily. (Patient not taking: Reported on 3/12/2024) 90 tablet 0     No facility-administered encounter medications on file as of 5/16/2024.      The chart was reviewed for recent diagnostic procedures and investigations, and pertinent results are noted below.    Wt Readings from Last 2 Encounters:   05/16/24 71.4 kg (157 lb 6.5 oz)   03/12/24 70.3 kg (155 lb)     BP Readings from Last 1 Encounters:   05/16/24 (!) 144/94     Pulse Readings from Last 1 Encounters:   05/16/24 87        Blood Counts, Electrolytes & Glucose: (i.e. WBC, ANC, Hemoglobin, Hematocrit, MCV, Platelets)  Lab Results   Component Value Date    WBC 5.20 08/29/2023    GRAN 3.3 08/29/2023    GRAN 63.4 08/29/2023    HGB 13.6 08/29/2023    HCT 42.3 08/29/2023    MCV 93 08/29/2023     08/29/2023     08/29/2023    K 4.2 08/29/2023    CALCIUM 9.4 08/29/2023    PHOS 3.2 01/25/2022    MG 1.9 01/25/2022    CO2 27 08/29/2023    ANIONGAP 10 08/29/2023    GLU 87 08/29/2023    HGBA1C 5.2 01/24/2022       Renal, Liver, Pancreas, Thyroid, Parathyroid, Prolactin, CPK, Lipids & Vitamin Levels: (i.e. Cr, BUN, Anion Gap, GFR, Urine Specific Gravity, Urine Protein, Microalburnin, AST, ALT, GGT, Alk Phos,Total Bili, Total Protein, Albumin, Ammonia, INR, Amylase, Lipase, TSH, Total T3, Total T4, Free T4 PTH, Prolactin, CPK, Cholesterol, Triglycerides, LDH, HDL, Vitamin B12, Folate, Vitamin D)  Lab Results   Component Value Date    CREATININE 0.7 08/29/2023    BUN 10 08/29/2023    EGFRNORACEVR >60.0 08/29/2023    SPECGRAV 1.010 09/01/2022    AST 16 08/29/2023    ALT 14 08/29/2023    ALKPHOS 56 08/29/2023    BILITOT 0.6 08/29/2023    ALBUMIN 3.8 08/29/2023    INR 1.1 01/25/2022    TSH 3.235 08/29/2023    CPK 61 01/23/2022    CHOL 141 08/29/2023    TRIG 70 08/29/2023    LDLCALC 69.0 08/29/2023    HDL 58 08/29/2023    [id number] 1109 (H) 08/29/2023       Infection Diseases, Pregnancy Screenings & Drug Levels: (i.e. Hepatitis  "Panel, HIV, Syphilis, Urine & Blood Pregnancy Screens, beta hCG, Lithium, Valproic Acid, Carbamazepine, Lamotrigine, Phenytoin, Phenobarbital, Clozapine, Norclozapine, Clozapine + Norclozapine)   No results found for: "HAV", "HEPAIGM", "HEPBIGM", "HEPBCAB", "HBEAG", "HEPCAB", "RAPIDHIV", "HIV1X2", "NBU73QHZO", "XU9FTEMO", "ABSOLUTECD4", "RPR", "PREGTESTUR", "PREGSERUM", "HCG", "HCGQUANT", "LITHIUM", "VALPROATE", "CBMZ", "LAMOTRIGINE", "PHENYTOIN", "PHENOBARB", "CLOZAPINE", "NORCLOZAP", "CLOZNORCLOZ"    Addiction: (i.e. Urine Toxicology, Blood Alcohol, PETH, EtG, EtS, CDT, Buprenorphine, Norbuprenorphine)  No results found for: "PCDSOALCOHOL", "PCDSOBENZOD", "BARBITURATES", "PCDSCOMETHA", "OPIATESCREEN", "COCAINEMETAB", "AMPHETAMINES", "MARIJUANATHC", "PCDSOPHENCYN", "PCDSUBUPRE", "PCDSUFENTANY", "PCDSUOXYCOD", "PCDSUTRAMA", "[id number]", "PETH", "ALCOHOLMEDIC", "THEYLGLUCU", "ETHYLSULF", "CDT", "BUPRENORPH", "NORBUPRENOR"    Results for orders placed or performed during the hospital encounter of 01/23/22   EKG 12-lead    Collection Time: 01/23/22  6:56 PM    Narrative    Test Reason : R07.9,    Vent. Rate : 055 BPM     Atrial Rate : 055 BPM     P-R Int : 130 ms          QRS Dur : 096 ms      QT Int : 406 ms       P-R-T Axes : 001 032 065 degrees     QTc Int : 388 ms    Sinus bradycardia  Nonspecific ST and T wave abnormality  Abnormal ECG  When compared with ECG of 12-OCT-2020 15:01,  ST now depressed in Lateral leads  T wave inversion no longer evident in Inferior leads  Nonspecific T wave abnormality now evident in Lateral leads  QT has shortened  Confirmed by Sunny BOLANOS, Edd LUND ([id number]) on 1/28/2022 8:44:56 AM    Referred By: [email or web address]   SELF           Confirmed By:Edd Correa MD        Assessment    Assessments & Case Formulation:     Assessments   Safety Case risk, violence risk, and suicide risk at this time are NOT high. Pt agrees to safety and no safety concerns were identified during the interview. "   Adherence  Barriers to adherence to treatment are unclear.   Strengths Patient accepts guidance/feedback. Patient is expressive/articulate.   Liabilities Patient has multiple health problems. This will strongly influence medication management. Coping skills are deficient or poorly employed. There is a history of failed treatments.   Goals Sleep: improve sleep hygiene  Anxiety: acquiring relapse prevention skills and reducing excessive behaviors associated with SALIENCE/APPREHENSION/ANXIOUS-AVOIDANCE, reducing safety behaviors, eliminating assumptions about dangerousness of anxiety, reducing assumptions about positive value of worry, modifying schemata of vulnerability and threats  Depression: acquiring relapse prevention skills, reducing ANHEDONIA/CONTEXT INSENSITIVITY, increasing interest in usual activities, increasing motivation, reducing excessive GUILT, reducing RUMINATION, increasing HOPE, increasing self-reward for positive thoughts and behaviors, reducing NEGATIVE BIAS, reducing negative automatic thoughts, increasing energy, reducing fatigue  Stress: acquiring relapse prevention skills, acquiring breathing skills   Case Formulation   Abstract  Pt who is grieving loss of her  is experiencing depressed mood and anxiety. Pt is open to resuming psychotherapy and making adjustments to pharmacotherapy.   Working DX Considering history, clinical presentation and instruments, the most likely mood disorder diagnosis is MDD. Unspecified anxiety disorder. Grief. Insomnia     ICD-10-CM ICD-9-CM   1. Mild episode of recurrent major depressive disorder  F33.0 296.31   2. Anxiety disorder, unspecified type  F41.9 300.00   3. Grief  F43.21 309.0   4. Insomnia, unspecified type  G47.00 780.52   5. BMI 28.0-28.9,adult  Z68.28 V85.24      Disease  Perspective Organic and biomedical factors have the potential to influence the symptomatology.  Relevant biomedical factors include  history of SDH (falls in [id number]),  "advanced age (64yo+), cardiovascular disease, chronic pain, elevated BMI, fall risk, GI dysfunction, high blood pressure, use of alcohol, use of an anticoagulant, use of caffeine, use of medications for medical problems with psychoactive properties, and vitamin B12 low by history.   Psychotropics to avoid may include those which are anxiogenic, deliriogenic, depressing, likely to cause weight gain, likely to raise BP, likely to worsen cardiovascular risk by worsening lipid profile, and unsafe in the elderly or on the Beers Criteria list.   Dimensions  Perspective Temperament and personality traits predispose the patient to certain strengths and vulnerabilities. At this time the patient's temperament is unclear.  Life circumstances provoke responses in the form of neurotic symptoms;  STRESS APPRAISAL is NOT influenced by a lack of self efficacy nor perceived helplessness.  Insufficient data is available to characterize other dimensions of the person, such as attachment patterns.   Behavior Perspective The behavior perspective assumes actions have an underlying design and purpose. Certain behaviors are socially learned and some behaviors are "motivated" and driven by physiological factors. The main goal of the behavior perspective is to restore productivity by stopping behavior, by altering drives and goals and by emphasizing responsibility and relapse prevention. A focus on interrupting behavior unfortunately comes with the burden of stigmatization. The following behaviors are relevant:  Alcohol use  Benzo use  Caffeine use   Life Story Perspective Grief  Illness  Living alone     Prognosis This patient would benefit from treatment that includes both individual psychotherapy and pharmacotherapy.  Favorable prognostic factors include receptivity to treatment, established social support structures, and having hobbies     Plan   Plan of Care:     Plan of Care (& Medication Management)   Chart was reviewed. The " risks and benefits of medication were discussed with pt. The treatment plan and followup plan were reviewed with pt. Pt understands to contact clinic if symptoms worsen. Pt understands to call 911 or go to nearest ER for suicidal ideation, intent or plan.   RX History ABILIFY, AMBIEN (somnambulism), ATARAX/VISTARIL, CYMBALTA, DEXEDRINE, KLONOPIN (since 2005), LEXAPRO, TRAZODONE,   WELLBUTRIN XL   Current RX Increase ABILIFY  Symptom coverage is insufficient  Metabolic monitoring:  AUG2023 Lipids acceptable  JAN2022 A1c 5.2  Adjustments:  16MAY2024: Increase to 5mg daily  Prior to 5/16/2024, pt had been taking 2mg daily  Continue CYMBALTA  Adjustments:  16MAY2024: Continue 60mg qam  Prior to 5/16/2024, pt had been taking 60mg qam  Continue KLONOPIN  Beers Criteria  Fall risk  Plan to attempt taper at future encounter  Adjustments:  16MAY2024: Continue 0.5-1mg HS  Prior to 5/16/2024, pt had been taking 0.5-1mg HS since 2005  Continue WELLBUTRIN XL  Adjustments:  16MAY2024: Continue 300mg qam  Prior to 5/16/2024, pt had been taking 300mg qam   Education & Counseling Educational material provided  RX administration and adherence  NARX/ REVIEWED (5/16/2024)  Clinic's CDS contract signed today 5/16/2024.  Sleep Hygiene: WAKING HOURS   Other Orders Continue individual psychotherapy  Consider CBT-I   Monitor VITAL SIGNS  Use of: benzo  sleep  Instruments: PROMIS (A&D), PSS4  Cognitive testing at future encounter   RETURN K: RETURN IN 4 WEEKS (ONE MONTH), and reassess frequency within three visits from now  Continue medication management      Billing Documentation:     Method of Encounter IN PERSON visit at the clinic   Type of Encounter New patient to me, NOT seen by department within last 3 years   Counseling;  Psychotherapy Not applicable: Not done or UNDER 16 minutes   Counseling;  Tobacco and/or Nicotine Not applicable: Not done or UNDER 3 minutes   Additional Codes and Modifiers N/A   Time Remaining Chart/Pt 71004:  NEW patient to me and DID prescribe meds (and two or fewer 64464/82470 codes within a year), 45+mins   Total Mins  (5/16/2024) 45+mins face-to-face with patient AND 30+mins charting        Massimo Jacobo DO  Department of Psychiatry, [facility]

## 2024-05-16 NOTE — PATIENT INSTRUCTIONS
Reach out to your therapist  Continue KLONOPIN, CYMBALTA and WELLBUTRIN as prescribed  Increase ABILIFY to 5mg daily    Avoid your bed during WAKING HOURS and sleeping in places other than your bed.

## 2024-05-17 ENCOUNTER — TELEPHONE (OUTPATIENT)
Dept: PRIMARY CARE CLINIC | Facility: CLINIC | Age: 79
End: 2024-05-17
Payer: MEDICARE

## 2024-05-17 NOTE — TELEPHONE ENCOUNTER
Clinician received a message to contact pt for an appt prior to seeing PCP on 5/21/2024. She is scheduled for 45 minutes at 2:30. Pt verbalized understanding.     Her significant other passed away.

## 2024-05-21 ENCOUNTER — OFFICE VISIT (OUTPATIENT)
Dept: PRIMARY CARE CLINIC | Facility: CLINIC | Age: 79
End: 2024-05-21
Payer: MEDICARE

## 2024-05-21 ENCOUNTER — CLINICAL SUPPORT (OUTPATIENT)
Dept: PRIMARY CARE CLINIC | Facility: CLINIC | Age: 79
End: 2024-05-21
Payer: MEDICARE

## 2024-05-21 VITALS
BODY MASS INDEX: 29.06 KG/M2 | HEIGHT: 62 IN | DIASTOLIC BLOOD PRESSURE: 80 MMHG | HEART RATE: 75 BPM | WEIGHT: 157.94 LBS | OXYGEN SATURATION: 97 % | SYSTOLIC BLOOD PRESSURE: 132 MMHG

## 2024-05-21 DIAGNOSIS — K59.00 CONSTIPATION, UNSPECIFIED CONSTIPATION TYPE: ICD-10-CM

## 2024-05-21 DIAGNOSIS — I48.11 LONGSTANDING PERSISTENT ATRIAL FIBRILLATION: ICD-10-CM

## 2024-05-21 DIAGNOSIS — Z79.899 POLYPHARMACY: ICD-10-CM

## 2024-05-21 DIAGNOSIS — R41.3 MEMORY LOSS: ICD-10-CM

## 2024-05-21 DIAGNOSIS — F33.1 MODERATE EPISODE OF RECURRENT MAJOR DEPRESSIVE DISORDER: Primary | ICD-10-CM

## 2024-05-21 DIAGNOSIS — E53.8 B12 DEFICIENCY: ICD-10-CM

## 2024-05-21 DIAGNOSIS — K21.9 GASTROESOPHAGEAL REFLUX DISEASE WITHOUT ESOPHAGITIS: ICD-10-CM

## 2024-05-21 DIAGNOSIS — Z13.6 SCREENING FOR CARDIOVASCULAR CONDITION: ICD-10-CM

## 2024-05-21 DIAGNOSIS — E53.8 VITAMIN B12 DEFICIENCY: ICD-10-CM

## 2024-05-21 DIAGNOSIS — F51.01 PRIMARY INSOMNIA: ICD-10-CM

## 2024-05-21 DIAGNOSIS — I10 PRIMARY HYPERTENSION: ICD-10-CM

## 2024-05-21 DIAGNOSIS — G31.9 CEREBRAL ATROPHY, MILD: ICD-10-CM

## 2024-05-21 DIAGNOSIS — G47.00 INSOMNIA, UNSPECIFIED TYPE: ICD-10-CM

## 2024-05-21 DIAGNOSIS — F41.1 GAD (GENERALIZED ANXIETY DISORDER): ICD-10-CM

## 2024-05-21 DIAGNOSIS — F43.21 GRIEF REACTION: Primary | ICD-10-CM

## 2024-05-21 PROCEDURE — 1160F RVW MEDS BY RX/DR IN RCRD: CPT | Mod: HCNC,CPTII,S$GLB, | Performed by: INTERNAL MEDICINE

## 2024-05-21 PROCEDURE — 99999 PR PBB SHADOW E&M-EST. PATIENT-LVL III: CPT | Mod: PBBFAC,HCNC,, | Performed by: INTERNAL MEDICINE

## 2024-05-21 PROCEDURE — 1100F PTFALLS ASSESS-DOCD GE2>/YR: CPT | Mod: HCNC,CPTII,S$GLB, | Performed by: INTERNAL MEDICINE

## 2024-05-21 PROCEDURE — 1126F AMNT PAIN NOTED NONE PRSNT: CPT | Mod: HCNC,CPTII,S$GLB, | Performed by: INTERNAL MEDICINE

## 2024-05-21 PROCEDURE — 1123F ACP DISCUSS/DSCN MKR DOCD: CPT | Mod: HCNC,CPTII,S$GLB, | Performed by: INTERNAL MEDICINE

## 2024-05-21 PROCEDURE — 1159F MED LIST DOCD IN RCRD: CPT | Mod: HCNC,CPTII,S$GLB, | Performed by: INTERNAL MEDICINE

## 2024-05-21 PROCEDURE — 3075F SYST BP GE 130 - 139MM HG: CPT | Mod: HCNC,CPTII,S$GLB, | Performed by: INTERNAL MEDICINE

## 2024-05-21 PROCEDURE — 99499 UNLISTED E&M SERVICE: CPT | Mod: HCNC,S$GLB,, | Performed by: SOCIAL WORKER

## 2024-05-21 PROCEDURE — 99213 OFFICE O/P EST LOW 20 MIN: CPT | Mod: HCNC,S$GLB,, | Performed by: INTERNAL MEDICINE

## 2024-05-21 PROCEDURE — 3079F DIAST BP 80-89 MM HG: CPT | Mod: HCNC,CPTII,S$GLB, | Performed by: INTERNAL MEDICINE

## 2024-05-21 PROCEDURE — 3288F FALL RISK ASSESSMENT DOCD: CPT | Mod: HCNC,CPTII,S$GLB, | Performed by: INTERNAL MEDICINE
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
## 2024-05-21 NOTE — PROGRESS NOTES
"INTERNAL MEDICINE PROGRESS/URGENT CARE NOTE    CHIEF COMPLAINT     Chief Complaint   Patient presents with    Follow-up       HPI   Madisyn Velasquez is a 78 y.o.female who presents with a PMHx of  Afib, HTN, HLD,  Depression/anxiety, GERD, fall with post traumatic SAH who presents today for her routine follow up  visit.      Her only medical concern today: worsening depression. Of note, her  who she served as caregiver has recently . Today, she reports that she has been doing "ok"          HTN: denies history of HTN. Note chart has multiple elevated readings. She was prescribed norvasc but has not been taking it.   BP today is within goal      Depression: ++PHQ9 , was 18 on initial visit. Loss of interest in doing most things she previously enjoyed such as going to water aerobics class, insomnia, +change in appetite. Previously took wellbutrin and lexapro but says she has been out of the wellbutrin for months on accident. Recently got a refill of her medication two days and have restarted it so she is waiting for it to take effect.      Repeated falls with recent SAH: two falls were due to tripping over her husbands oxygen tube     Polypharmacy: long discussion re medication use. Advised against the use of celebrex and eliquis and she agreed to stop the celebrex because it doesn't seem to help her anyways. Advised against the  Chronic long term use of clonipin due to her fall history and being on a NOAC. She will try to wean down off this. She uses it for insomnia, and agrees to trial of trazodone instead while we wean her slowly of the clonazepam. Also advised good sleep hygiene.        chronic forgetfulness. Noted with issues with medication.   Has a lot on her plate and constantly multitasking.   Her  thinks her hearing is not good but she thinks its selective hearing loss  TSH and vitamin B12 labs were ordered at her last visit but not done.   Specifically describes that when she goies into " a room ofetn forget when she went in there for. Takes a fruit for her  but then forgets where she put it. Now having issues with major ADLs and iADLs such as bill paying or driving but with minor daily functions.   She does do a pill box. Etc      Insomnia: patient now takes clopinin (was on this prior to my meeting her ut was on 1mg now on only 1/2 taht dose) which is great that weve been able to wean. But she was unsuccessful at weaning lower. Also took the trazodone and christie th makes her sleep ebtter. We tried with one each and she could not fall asleep or stay asleep.       Home Medications:  Prior to Admission medications    Medication Sig Start Date End Date Taking? Authorizing Provider   ARIPiprazole (ABILIFY) 5 MG Tab Take 1 tablet (5 mg total) by mouth once daily. 5/16/24 7/15/24  Massimo Jacobo, DO   buPROPion (WELLBUTRIN XL) 300 MG 24 hr tablet Take 1 tablet (300 mg total) by mouth once daily. 5/16/24 7/15/24  Massimo Jacobo, DO   celecoxib (CELEBREX) 200 MG capsule Take 200 mg by mouth.    Provider, Historical   clonazePAM (KLONOPIN) 0.5 MG disintegrating tablet Take 1-2 tablets (0.5-1 mg total) by mouth nightly as needed (insomnia). 5/16/24 7/15/24  Massimo Jacboo, DO   co-enzyme Q-10 30 mg capsule Take 30 mg by mouth 3 (three) times daily.    Provider, Historical   DULoxetine (CYMBALTA) 60 MG capsule Take 1 capsule (60 mg total) by mouth every morning. 5/16/24 7/15/24  Massimo Jacobo, DO   ELIQUIS 5 mg Tab Take 5 mg by mouth 2 (two) times daily. 3/8/22   Provider, Historical   metronidazole 0.75% (METROCREAM) 0.75 % Crea Apply topically nightly as needed. 6/4/23   Provider, Historical   MYRBETRIQ 25 mg Tb24 ER tablet TAKE 1 TABLET(25 MG) BY MOUTH EVERY DAY  Patient taking differently: Take 50 mg by mouth once daily. 5/2/24   Ela Mays MD   rosuvastatin (CRESTOR) 10 MG tablet Take 1 tablet (10 mg total) by mouth once daily. 6/7/22   Ela Mays,  "MD   sotaloL (BETAPACE) 80 MG tablet Take 40 mg by mouth 2 (two) times daily.    Provider, Historical       Review of Systems:  Review of Systems        Advance Care Planning     Date: 05/21/2024    Power of   I initiated the process of voluntary advance care planning today and explained the importance of this process to the patient.  I introduced the concept of advance directives to the patient, as well. Then the patient received detailed information about the importance of designating a Health Care Power of  (HCPOA). She was also instructed to communicate with this person about their wishes for future healthcare, should she become sick and lose decision-making capacity. The patient has previously appointed a HCPOA. After our discussion, the patient has decided to complete a HCPOA and has appointed her son, health care agent:  on file  & health care agent number:  on file . I encouraged her to communicate with this person about their wishes for future healthcare, should she become sick and lose decision-making capacity.      A total of 10 min was spent on advance care planning, goals of care discussion, emotional support, formulating and communicating prognosis and exploring burden/benefit of various approaches of treatment. This discussion occurred on a fully voluntary basis with the verbal consent of the patient and/or family.           PHYSICAL EXAM     /80 (BP Location: Left arm, Patient Position: Sitting, BP Method: Medium (Manual))   Pulse 75   Ht 5' 2" (1.575 m)   Wt 71.6 kg (157 lb 15.4 oz)   SpO2 97%   BMI 28.89 kg/m²     GEN - A+OX4, NAD   HEENT - PERRL, EOMI, OP clear  Neck - No thyromegaly or cervical LAD. No thyroid masses felt.  CV - RRR, no m/r   Chest - CTAB, no wheezing or rhonchi  Abd - S/NT/ND/+BS.   Ext - 2+BDP and radial pulses. No C/C/E.  Skin - No rash.    LABS         ASSESSMENT/PLAN     Madisyn Velasquez is a 78 y.o. female with  1. Moderate episode of recurrent " "major depressive disorder  Mood is stable  Deneis SI/HI  Continue current management    2. Longstanding persistent atrial fibrillation  Rate controlled.   Asymptomatic  A/c with eliquis.   Contiue current management    3. Cerebral atrophy, mild  Overview:  Found on CT from 3/22 (went to ER after a fall, found to have a SAH which self resolved). MRI and CT both showed "mild volume loss", consistent with age related cerebral atrophy.       4. Vitamin B12 deficiency  Cntineu with supplementations    5. ELIAS (generalized anxiety disorder)  Follows with psychiatry who prescribes her medications   Stable and doing well e    6. B12 deficiency  Continue with supplementation    7. Gastroesophageal reflux disease without esophagitis  Overview:  Well controlled       8. Primary insomnia  Doing well     9. Constipation, unspecified constipation type  Encouraged fiber, fluids and excercise    10. Insomnia, unspecified type  Improved     11. Primary hypertension  Overview:  BP is well controlled.   Continue current therapy      12. Polypharmacy    13. Memory loss  Encouraged proper exercise, diet and cognitive excercises     14. Screening for cardiovascular condition           WORRY SCORE 2    RTC in 3 months, sooner if needed and depending on labs.    Ela Mays MD  Board Certified Internist/Geriatrician  [facility] ([place])      "

## 2024-05-29 ENCOUNTER — TELEPHONE (OUTPATIENT)
Dept: PRIMARY CARE CLINIC | Facility: CLINIC | Age: 79
End: 2024-05-29
Payer: MEDICARE

## 2024-05-29 NOTE — TELEPHONE ENCOUNTER
Returned call to pt. She reported stress related to px with step son producing another will (from death of ). Pt voiced concern over possibility and results if this will is valid. She is asking for a sooner appt than next week. She is scheduled for Friday 5/31/2024 at 3:00

## 2024-05-31 ENCOUNTER — CLINICAL SUPPORT (OUTPATIENT)
Dept: PRIMARY CARE CLINIC | Facility: CLINIC | Age: 79
End: 2024-05-31
Payer: MEDICARE

## 2024-05-31 DIAGNOSIS — F43.21 GRIEF REACTION: Primary | ICD-10-CM

## 2024-05-31 DIAGNOSIS — Z65.8 PSYCHOSOCIAL DISTRESS: ICD-10-CM

## 2024-05-31 PROCEDURE — 99499 UNLISTED E&M SERVICE: CPT | Mod: HCNC,S$GLB,, | Performed by: SOCIAL WORKER
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
## 2024-06-10 ENCOUNTER — OFFICE VISIT (OUTPATIENT)
Dept: PRIMARY CARE CLINIC | Facility: CLINIC | Age: 79
End: 2024-06-10
Payer: MEDICARE

## 2024-06-10 VITALS
HEART RATE: 62 BPM | HEIGHT: 62 IN | DIASTOLIC BLOOD PRESSURE: 78 MMHG | OXYGEN SATURATION: 98 % | WEIGHT: 155.88 LBS | SYSTOLIC BLOOD PRESSURE: 138 MMHG | BODY MASS INDEX: 28.69 KG/M2

## 2024-06-10 DIAGNOSIS — R42 DIZZINESS: Primary | ICD-10-CM

## 2024-06-10 DIAGNOSIS — Z13.6 SCREENING FOR CARDIOVASCULAR CONDITION: ICD-10-CM

## 2024-06-10 DIAGNOSIS — R03.0 ELEVATED BLOOD PRESSURE READING: ICD-10-CM

## 2024-06-10 DIAGNOSIS — R53.83 FATIGUE, UNSPECIFIED TYPE: ICD-10-CM

## 2024-06-10 DIAGNOSIS — D69.2 SENILE PURPURA: ICD-10-CM

## 2024-06-10 PROCEDURE — 3075F SYST BP GE 130 - 139MM HG: CPT | Mod: HCNC,CPTII,S$GLB, | Performed by: INTERNAL MEDICINE

## 2024-06-10 PROCEDURE — 3078F DIAST BP <80 MM HG: CPT | Mod: HCNC,CPTII,S$GLB, | Performed by: INTERNAL MEDICINE

## 2024-06-10 PROCEDURE — 1101F PT FALLS ASSESS-DOCD LE1/YR: CPT | Mod: HCNC,CPTII,S$GLB, | Performed by: INTERNAL MEDICINE

## 2024-06-10 PROCEDURE — 1159F MED LIST DOCD IN RCRD: CPT | Mod: HCNC,CPTII,S$GLB, | Performed by: INTERNAL MEDICINE

## 2024-06-10 PROCEDURE — 1123F ACP DISCUSS/DSCN MKR DOCD: CPT | Mod: HCNC,CPTII,S$GLB, | Performed by: INTERNAL MEDICINE

## 2024-06-10 PROCEDURE — 1160F RVW MEDS BY RX/DR IN RCRD: CPT | Mod: HCNC,CPTII,S$GLB, | Performed by: INTERNAL MEDICINE

## 2024-06-10 PROCEDURE — 99213 OFFICE O/P EST LOW 20 MIN: CPT | Mod: HCNC,S$GLB,, | Performed by: INTERNAL MEDICINE

## 2024-06-10 PROCEDURE — 1126F AMNT PAIN NOTED NONE PRSNT: CPT | Mod: HCNC,CPTII,S$GLB, | Performed by: INTERNAL MEDICINE

## 2024-06-10 PROCEDURE — 99999 PR PBB SHADOW E&M-EST. PATIENT-LVL III: CPT | Mod: PBBFAC,HCNC,, | Performed by: INTERNAL MEDICINE

## 2024-06-10 PROCEDURE — 3288F FALL RISK ASSESSMENT DOCD: CPT | Mod: HCNC,CPTII,S$GLB, | Performed by: INTERNAL MEDICINE

## 2024-06-10 RX ORDER — METRONIDAZOLE 7.5 MG/G
CREAM TOPICAL NIGHTLY PRN
Qty: 1 EACH | Refills: 0 | Status: SHIPPED | OUTPATIENT
Start: 2024-06-10

## 2024-06-10 NOTE — LETTER
Krystal 10, 2024    Madisyn Velasquez  [address] [address] [address]             [facility] [place]  [address]  [facility] [address]  [place] [address]  Phone: [phone]  Fax: [phone] To whom it may concern,   Patient would like me to comment on her decision making abilities and mental state. Having known patient for over two years, she's shown no signs of poor decision making, served as primary caregiver for her recently  . She appears to be in sound mind from our interactions.     Sincerely,        Ela Mays MD

## 2024-06-10 NOTE — PROGRESS NOTES
INTERNAL MEDICINE PROGRESS/URGENT CARE NOTE    CHIEF COMPLAINT     Chief Complaint   Patient presents with    Dizziness     Patient presents for intermittent dizziness x2.5 weeks. Patient complained of weakness that has resolved, but fell during a dizzy episode.     Urinary Incontinence     Patient sefl-increased Myrbetriq to 50mg QD instead of the 25mg, because symptoms were not being controlled.       HPI     Madisyn Velasquez is a 78 y.o.  female who presents for an urgent/follow up visit today.  Patient is here today for a scheduled appointment due to concerns for elevated blood pressure and dizziness.     Patient describes: dizziness. After a long history and ros, notes that she's stopped taking her lexapro without weaning    Saw psychiatrist and he doubled the dose of abilify, ut now having some issues forgetful and memory loss. Will discuss with psychiatrist.     Increased myretriq to 50mg and this has worked much better for her bladder.     BP was only elevated once when she was dizzy.     Past Medical History:  Past Medical History:   Diagnosis Date    Anxiety     Constipation     Depression     Insomnia        Home Medications:  Prior to Admission medications    Medication Sig Start Date End Date Taking? Authorizing Provider   ARIPiprazole (ABILIFY) 5 MG Tab Take 1 tablet (5 mg total) by mouth once daily. 5/16/24 7/15/24  Massimo Jacobo, DO   buPROPion (WELLBUTRIN XL) 300 MG 24 hr tablet Take 1 tablet (300 mg total) by mouth once daily. 5/16/24 7/15/24  Massimo Jacobo, DO   celecoxib (CELEBREX) 200 MG capsule Take 200 mg by mouth.    Provider, Historical   clonazePAM (KLONOPIN) 0.5 MG disintegrating tablet Take 1-2 tablets (0.5-1 mg total) by mouth nightly as needed (insomnia). 5/16/24 7/15/24  Massimo Jacobo, DO   co-enzyme Q-10 30 mg capsule Take 30 mg by mouth 3 (three) times daily.    Provider, Historical   DULoxetine (CYMBALTA) 60 MG capsule Take 1 capsule (60 mg total) by mouth  "every morning. 5/16/24 7/15/24  Massimo Jacobo DO   ELIQUIS 5 mg Tab Take 5 mg by mouth 2 (two) times daily. 3/8/22   Provider, Historical   metronidazole 0.75% (METROCREAM) 0.75 % Crea Apply topically nightly as needed. 6/4/23   Provider, Historical   MYRBETRIQ 25 mg Tb24 ER tablet TAKE 1 TABLET(25 MG) BY MOUTH EVERY DAY  Patient taking differently: Take 50 mg by mouth once daily. 5/2/24   Ela Mays MD   rosuvastatin (CRESTOR) 10 MG tablet Take 1 tablet (10 mg total) by mouth once daily. 6/7/22   Ela Mays MD   sotaloL (BETAPACE) 80 MG tablet Take 40 mg by mouth 2 (two) times daily.    Provider, Historical       Review of Systems:  Review of Systems      Advance Care Planning     Date: 06/10/2024    Power of   I initiated the process of voluntary advance care planning today and explained the importance of this process to the patient.  I introduced the concept of advance directives to the patient, as well. Then the patient received detailed information about the importance of designating a Health Care Power of  (HCPOA). She was also instructed to communicate with this person about their wishes for future healthcare, should she become sick and lose decision-making capacity. The patient has previously appointed a HCPOA. After our discussion, the patient has decided to complete a HCPOA and has appointed her  family  , health care agent:  o n file & health care agent number:  on file10  I spent a total time of  minutes discussing this issue with the patient              PHYSICAL EXAM     BP (!) 142/70 (BP Location: Right arm, Patient Position: Sitting, BP Method: Medium (Manual))   Pulse 62   Ht 5' 2" (1.575 m)   Wt 70.7 kg (155 lb 13.8 oz)   SpO2 98%   BMI 28.51 kg/m²     GEN - A+OX4, NAD   HEENT - PERRL, EOMI, OP clear  Neck - No thyromegaly or cervical LAD. No thyroid masses felt.  CV - RRR, no m/r   Chest - CTAB, no wheezing or rhonchi  Abd - S/NT/ND/+BS.   Ext - " 2+BDP and radial pulses. No C/C/E.  Skin - No rash.    LABS         ASSESSMENT/PLAN     Madisyn Velasquez is a 78 y.o. female with  1. Dizziness  Likely 2/2 SSRI withdrawal syndrome   Improving.     2. Elevated blood pressure reading  Resolved. Was due to anxity from her dizziness     3. Senile purpura  Senile purpura  -benign skin condition. Often caused by the thin skin associated with aging.   -counseled patient on proper skin protection and moisturization and avoidance of skin trauma       4. Screening for cardiovascular condition  Lipid panel     5. Fatigue, unspecified type  resolved           WORRY SCORE 2    RTC in 3 months, sooner if needed and depending on labs.    Ela Mays MD  Board Certified Internist/Geriatrician  [facility] ([place])

## 2024-06-17 ENCOUNTER — OFFICE VISIT (OUTPATIENT)
Dept: PSYCHIATRY | Facility: CLINIC | Age: 79
End: 2024-06-17
Payer: MEDICARE

## 2024-06-17 VITALS
HEIGHT: 62 IN | WEIGHT: 154.13 LBS | HEART RATE: 83 BPM | DIASTOLIC BLOOD PRESSURE: 72 MMHG | BODY MASS INDEX: 28.36 KG/M2 | SYSTOLIC BLOOD PRESSURE: 142 MMHG

## 2024-06-17 DIAGNOSIS — F43.21 GRIEF: ICD-10-CM

## 2024-06-17 DIAGNOSIS — F33.0 MILD EPISODE OF RECURRENT MAJOR DEPRESSIVE DISORDER: Primary | ICD-10-CM

## 2024-06-17 DIAGNOSIS — G47.00 INSOMNIA, UNSPECIFIED TYPE: ICD-10-CM

## 2024-06-17 DIAGNOSIS — F41.9 ANXIETY DISORDER, UNSPECIFIED TYPE: ICD-10-CM

## 2024-06-17 DIAGNOSIS — Z91.89 AT RISK FOR PROLONGED QT INTERVAL SYNDROME: ICD-10-CM

## 2024-06-17 PROCEDURE — 1157F ADVNC CARE PLAN IN RCRD: CPT | Mod: HCNC,CPTII,S$GLB, | Performed by: STUDENT IN AN ORGANIZED HEALTH CARE EDUCATION/TRAINING PROGRAM

## 2024-06-17 PROCEDURE — 3288F FALL RISK ASSESSMENT DOCD: CPT | Mod: HCNC,CPTII,S$GLB, | Performed by: STUDENT IN AN ORGANIZED HEALTH CARE EDUCATION/TRAINING PROGRAM

## 2024-06-17 PROCEDURE — 96136 PSYCL/NRPSYC TST PHY/QHP 1ST: CPT | Mod: 59,HCNC,S$GLB, | Performed by: STUDENT IN AN ORGANIZED HEALTH CARE EDUCATION/TRAINING PROGRAM

## 2024-06-17 PROCEDURE — 3078F DIAST BP <80 MM HG: CPT | Mod: HCNC,CPTII,S$GLB, | Performed by: STUDENT IN AN ORGANIZED HEALTH CARE EDUCATION/TRAINING PROGRAM

## 2024-06-17 PROCEDURE — 1160F RVW MEDS BY RX/DR IN RCRD: CPT | Mod: HCNC,CPTII,S$GLB, | Performed by: STUDENT IN AN ORGANIZED HEALTH CARE EDUCATION/TRAINING PROGRAM

## 2024-06-17 PROCEDURE — 1101F PT FALLS ASSESS-DOCD LE1/YR: CPT | Mod: HCNC,CPTII,S$GLB, | Performed by: STUDENT IN AN ORGANIZED HEALTH CARE EDUCATION/TRAINING PROGRAM

## 2024-06-17 PROCEDURE — 99214 OFFICE O/P EST MOD 30 MIN: CPT | Mod: HCNC,S$GLB,, | Performed by: STUDENT IN AN ORGANIZED HEALTH CARE EDUCATION/TRAINING PROGRAM

## 2024-06-17 PROCEDURE — 1126F AMNT PAIN NOTED NONE PRSNT: CPT | Mod: HCNC,CPTII,S$GLB, | Performed by: STUDENT IN AN ORGANIZED HEALTH CARE EDUCATION/TRAINING PROGRAM

## 2024-06-17 PROCEDURE — 1159F MED LIST DOCD IN RCRD: CPT | Mod: HCNC,CPTII,S$GLB, | Performed by: STUDENT IN AN ORGANIZED HEALTH CARE EDUCATION/TRAINING PROGRAM

## 2024-06-17 PROCEDURE — 99999 PR PBB SHADOW E&M-EST. PATIENT-LVL IV: CPT | Mod: PBBFAC,HCNC,, | Performed by: STUDENT IN AN ORGANIZED HEALTH CARE EDUCATION/TRAINING PROGRAM

## 2024-06-17 PROCEDURE — 3077F SYST BP >= 140 MM HG: CPT | Mod: HCNC,CPTII,S$GLB, | Performed by: STUDENT IN AN ORGANIZED HEALTH CARE EDUCATION/TRAINING PROGRAM

## 2024-06-17 RX ORDER — ESCITALOPRAM OXALATE 10 MG/1
10 TABLET ORAL EVERY MORNING
Qty: 30 TABLET | Refills: 1 | Status: SHIPPED | OUTPATIENT
Start: 2024-06-17 | End: 2024-08-16

## 2024-06-17 RX ORDER — BUPROPION HYDROCHLORIDE 300 MG/1
300 TABLET ORAL DAILY
Qty: 30 TABLET | Refills: 1 | Status: SHIPPED | OUTPATIENT
Start: 2024-06-17 | End: 2024-08-16

## 2024-06-17 RX ORDER — ARIPIPRAZOLE 5 MG/1
5 TABLET ORAL DAILY
Qty: 30 TABLET | Refills: 1 | Status: SHIPPED | OUTPATIENT
Start: 2024-06-17 | End: 2024-08-16

## 2024-06-17 RX ORDER — DULOXETIN HYDROCHLORIDE 20 MG/1
CAPSULE, DELAYED RELEASE ORAL
Qty: 15 CAPSULE | Refills: 0 | Status: SHIPPED | OUTPATIENT
Start: 2024-06-17 | End: 2024-06-27

## 2024-06-17 NOTE — PROGRESS NOTES
Outpatient Psychiatry Followup Visit (DO/MD/NP, etc.)    6/17/2024  Assessment & Plan    Assessment - Plan:     Impression     ICD-10-CM ICD-9-CM   1. Mild episode of recurrent major depressive disorder  F33.0 296.31   2. Insomnia, unspecified type  G47.00 780.52   3. Anxiety disorder, unspecified type  F41.9 300.00   4. Grief  F43.21 309.0   5. At risk for prolonged QT interval syndrome  Z91.89 V15.89   6. BMI 28.0-28.9,adult  Z68.28 V85.24        Plan of Care & Medication Management    Chart was reviewed. The risks and benefits of medication were discussed with pt. The treatment plan and followup plan were reviewed with pt. Pt understands to contact clinic if symptoms worsen. Pt understands to call 911 or go to nearest ER for suicidal ideation, intent or plan.   RX History ABILIFY, AMBIEN (somnambulism), ATARAX/VISTARIL, CYMBALTA, DEXEDRINE, KLONOPIN (since 2005), LEXAPRO, TRAZODONE,   WELLBUTRIN XL   Current RX Continue ABILIFY  Metabolic monitoring:  AUG2023 Lipids acceptable  JAN2022 A1c 5.2  Adjustments:  16MAY2024: Increase to 5mg daily  Prior to 5/16/2024, pt had been taking 2mg daily  Start LEXAPRO  6/17/2024: Discussed standard serotonin syndrome contingency plan: Stop taking and reach out to the clinic for worsening of insomnia, confusion, agitation or restlessness, tremors, rising blood pressure, sweating or rapid heart rate.  Tolerated in the past. Took LEXAPRO for years until MAR2024  Taking SOTALOL for years also, monitor QTc  Adjustments:  17JUN2024: Start 10mg daily after completion of CYMBALTA taper  Taper to discontinue CYMBALTA  Adjustments:  17JUN2024: Reduce to 40mg qam for 5d, then 20mg qam for 5d then replace with LEXAPRO 10mg qam  16MAY2024: Continue 60mg qam  Prior to 5/16/2024, pt had been taking 60mg qam  Continue KLONOPIN  Beers Criteria  Fall risk  Plan to attempt taper at future encounter  Adjustments:  16MAY2024: Continue 0.5-1mg HS  Prior to 5/16/2024, pt had been taking 0.5-1mg  HS since 2005  Continue WELLBUTRIN XL  Adjustments:  16MAY2024: Continue 300mg qam  Prior to 5/16/2024, pt had been taking 300mg qam   Education & Counseling RX administration and adherence   Other Orders Referral to CBT-I class  EKG  Continue individual psychotherapy   Monitor VITAL SIGNS  Use of: benzo  sleep  Instruments: PROMIS (A&D), PSS4  MINICOG v.1 5/5 [id number]   RETURN E: RETURN IN 2 WEEKS, and reassess frequency within two visits from now  Continue medication management     Subjective    Interval History:     Available documentation has been reviewed, and pertinent elements of the chart have been incorporated into this note where appropriate. Last Epic encounter with writer was on 5/16/2024   Madisyn Velasquez, a 78 y.o. female, presenting for followup visit.      Since last visit, reports overall A LITTLE WORSE.    Reports depression is somewhat worse. Sleep hygiene is somewhat worse.     Exercising, socializing. Eating dinner with niece every night. Less lonely    Concerned about short-term memory. More forgetful. MINCOG today 5/5. Explored KLONOPIN use - discussed adding CBT-I prior to attempting tapering off.    Pt requests switching from CYMBALTA back to LEXAPRO, which she had taken for years until MAR2024. Discussed taper to discontinue followed by switch. Will also monitor QTc.    Anxiety is a problem - rumination/worry and restlessness/hypervigilance.    Problems with her 's will. Grief persists. Encouraged again to reach out to therapist. Increase visit frequency to q2w while adjusting medications.    Will continue treatment otherwise as planned       Unless otherwise specified, pt did NOT display signs of nor endorse symptoms of overt psychosis or acute mood disorder requiring hospitalization during the encounter; pt denied violent thoughts or suicidal or homicidal ideation, intent, or plan.         Objective    Measurement-Based Care (MBC):     Routine Instruments   PROMIS-ANXIETY  "Interpretation: 7 (4a raw score): T-SCORE 53.7; NONE TO SLIGHT using 55-60-70 cutoffs. Stratification of anxiety severity was done with GAD7 last time; compared to NEGATIVE 3/21 last time, severity probably shows NO significant change. Changed instruments today; severity changes may be artifact. See "Interval History."   PROMIS-DEPRESSION Interpretation: 6 (4a raw score): T-SCORE 51.8; NONE TO SLIGHT using 55-60-70 cutoffs. Stratification of depression severity was done with PHQ9 last time; compared to MILD 8/27 last time, severity probably is about the same. Changed instruments today; severity changes may be artifact. See "Interval History."   PSS4 Interpretation: 8/16; MODERATE using 6-11 cutoffs. 1 PH, 0 LSE. Moderate perceived helplessness; pt moderately experiences a lack of control over responses to stress.   Additional Instruments   MINICOG (Mini-Cog)  Version v.01.19.16  Word list version 1 (banana, sunrise, chair)  6/17/2024   Section Scores   Word Recall 3/3    Clock Draw 2/2    Total Score   5/5   A cut point of <3 on the Mini-Cog (MINICOG) has been validated for dementia screening, but many individuals with clinically meaningful cognitive impairment will score higher. When greater sensitivity is desired, a cut point of <4 is recommended as it may indicate a need for further evaluation of cognitive status.        Current Evaluation of Mental Status:     Constitutional / General       Vitals:    06/17/24 1525   BP: (!) 142/72   Pulse: 83   Weight: 69.9 kg (154 lb 1.6 oz)   Height: 5' 2" (1.575 m)       Psychiatric / Mental Status Examination  1. Appearance: Dress is informal but appropriate. Motor activity normal.  2. Discourse: Clear speech with normal rate and volume. Associations intact. Orderly.  3. Emotional Expression: Somewhat anxious and depressed mood. Affect is appropriate.  4. Perception and Thinking: No hallucinations. No suicidality, no homicidality, delusions, or paranoia.  5. Sensorium: " "Grossly intact. Able to focus for interview.  6. Memory and Fund of Knowledge: Intact for content of interview.  7. Insight and Judgment: Intact.         Auto-populated chart data omitted from this note for brevity.      Billing Documentation:     Method of Encounter IN PERSON visit at the clinic   Type of Encounter Follow up visit with me   Counseling;  Psychotherapy    Counseling;  Tobacco and/or Nicotine    Additional Codes and Modifiers 80951, with modifer 59: administered and scored more than one psychological or neuropsychological tests (see MBC above) (16+ mins)   Time Remaining Chart/Pt 10732: FOLLOW UP VISIT, Rx mgmt, "Multiple STABLE chronic illnesses"   Total Mins  (6/17/2024) N/A - Not billing for time        Massimo Jacobo DO  Department of Psychiatry, [facility]        "

## 2024-06-17 NOTE — PATIENT INSTRUCTIONS
CBT-I is considered to be gold standard for treating insomnia. Referral placed for CBT-I (insomnia class). Alternatively, it can be accessed online at [email or web address]      Reach out to your therapist to schedule an appointment    Reduce CYMBALTA from 60mg to 40mg daily for 5 days, then from 40mg to 20mg daily for 5 days, then discontinue  After you've tapered off CYMBALTA, start LEXAPRO 10mg daily in the morning. Stop taking and reach out to the clinic for worsening of insomnia, confusion, agitation or restlessness, tremors, rising blood pressure, sweating or rapid heart rate.    Continue other medications as prescribed     Complete EKG at your earliest convenience.

## 2024-06-28 ENCOUNTER — HOSPITAL ENCOUNTER (OUTPATIENT)
Dept: PREADMISSION TESTING | Facility: HOSPITAL | Age: 79
Discharge: HOME | End: 2024-06-28
Attending: STUDENT IN AN ORGANIZED HEALTH CARE EDUCATION/TRAINING PROGRAM
Payer: MEDICARE

## 2024-06-28 DIAGNOSIS — Z91.89 AT RISK FOR PROLONGED QT INTERVAL SYNDROME: ICD-10-CM

## 2024-06-28 DIAGNOSIS — F33.0 MILD EPISODE OF RECURRENT MAJOR DEPRESSIVE DISORDER: ICD-10-CM

## 2024-06-28 LAB
OHS QRS DURATION: 90 MS
OHS QTC CALCULATION: 415 MS

## 2024-06-28 PROCEDURE — 93005 ELECTROCARDIOGRAM TRACING: CPT | Performed by: INTERNAL MEDICINE

## 2024-06-28 PROCEDURE — 93010 ELECTROCARDIOGRAM REPORT: CPT | Mod: ,,, | Performed by: INTERNAL MEDICINE

## 2024-07-01 LAB
OHS QRS DURATION: 90 MS
OHS QTC CALCULATION: 415 MS

## 2024-07-03 ENCOUNTER — OFFICE VISIT (OUTPATIENT)
Dept: PSYCHIATRY | Facility: CLINIC | Age: 79
End: 2024-07-03
Payer: MEDICARE

## 2024-07-03 VITALS
HEART RATE: 91 BPM | WEIGHT: 151.69 LBS | HEIGHT: 62 IN | DIASTOLIC BLOOD PRESSURE: 75 MMHG | SYSTOLIC BLOOD PRESSURE: 156 MMHG | BODY MASS INDEX: 27.92 KG/M2

## 2024-07-03 DIAGNOSIS — F33.0 MILD EPISODE OF RECURRENT MAJOR DEPRESSIVE DISORDER: Primary | ICD-10-CM

## 2024-07-03 DIAGNOSIS — F43.21 GRIEF: ICD-10-CM

## 2024-07-03 DIAGNOSIS — F41.9 ANXIETY DISORDER, UNSPECIFIED TYPE: ICD-10-CM

## 2024-07-03 DIAGNOSIS — G47.00 INSOMNIA, UNSPECIFIED TYPE: ICD-10-CM

## 2024-07-03 PROCEDURE — 3078F DIAST BP <80 MM HG: CPT | Mod: HCNC,CPTII,S$GLB, | Performed by: STUDENT IN AN ORGANIZED HEALTH CARE EDUCATION/TRAINING PROGRAM

## 2024-07-03 PROCEDURE — 99214 OFFICE O/P EST MOD 30 MIN: CPT | Mod: HCNC,S$GLB,, | Performed by: STUDENT IN AN ORGANIZED HEALTH CARE EDUCATION/TRAINING PROGRAM

## 2024-07-03 PROCEDURE — 96136 PSYCL/NRPSYC TST PHY/QHP 1ST: CPT | Mod: 59,HCNC,S$GLB, | Performed by: STUDENT IN AN ORGANIZED HEALTH CARE EDUCATION/TRAINING PROGRAM

## 2024-07-03 PROCEDURE — 1159F MED LIST DOCD IN RCRD: CPT | Mod: HCNC,CPTII,S$GLB, | Performed by: STUDENT IN AN ORGANIZED HEALTH CARE EDUCATION/TRAINING PROGRAM

## 2024-07-03 PROCEDURE — 1101F PT FALLS ASSESS-DOCD LE1/YR: CPT | Mod: HCNC,CPTII,S$GLB, | Performed by: STUDENT IN AN ORGANIZED HEALTH CARE EDUCATION/TRAINING PROGRAM

## 2024-07-03 PROCEDURE — 3288F FALL RISK ASSESSMENT DOCD: CPT | Mod: HCNC,CPTII,S$GLB, | Performed by: STUDENT IN AN ORGANIZED HEALTH CARE EDUCATION/TRAINING PROGRAM

## 2024-07-03 PROCEDURE — 1160F RVW MEDS BY RX/DR IN RCRD: CPT | Mod: HCNC,CPTII,S$GLB, | Performed by: STUDENT IN AN ORGANIZED HEALTH CARE EDUCATION/TRAINING PROGRAM

## 2024-07-03 PROCEDURE — 90833 PSYTX W PT W E/M 30 MIN: CPT | Mod: HCNC,S$GLB,, | Performed by: STUDENT IN AN ORGANIZED HEALTH CARE EDUCATION/TRAINING PROGRAM

## 2024-07-03 PROCEDURE — 1157F ADVNC CARE PLAN IN RCRD: CPT | Mod: HCNC,CPTII,S$GLB, | Performed by: STUDENT IN AN ORGANIZED HEALTH CARE EDUCATION/TRAINING PROGRAM

## 2024-07-03 PROCEDURE — 3077F SYST BP >= 140 MM HG: CPT | Mod: HCNC,CPTII,S$GLB, | Performed by: STUDENT IN AN ORGANIZED HEALTH CARE EDUCATION/TRAINING PROGRAM

## 2024-07-03 PROCEDURE — 1125F AMNT PAIN NOTED PAIN PRSNT: CPT | Mod: HCNC,CPTII,S$GLB, | Performed by: STUDENT IN AN ORGANIZED HEALTH CARE EDUCATION/TRAINING PROGRAM

## 2024-07-03 PROCEDURE — 99999 PR PBB SHADOW E&M-EST. PATIENT-LVL IV: CPT | Mod: PBBFAC,HCNC,, | Performed by: STUDENT IN AN ORGANIZED HEALTH CARE EDUCATION/TRAINING PROGRAM

## 2024-07-03 RX ORDER — ESCITALOPRAM OXALATE 10 MG/1
10 TABLET ORAL EVERY MORNING
Qty: 30 TABLET | Refills: 1 | Status: SHIPPED | OUTPATIENT
Start: 2024-07-03 | End: 2024-09-01

## 2024-07-03 RX ORDER — ARIPIPRAZOLE 5 MG/1
5 TABLET ORAL DAILY
Qty: 30 TABLET | Refills: 1 | Status: SHIPPED | OUTPATIENT
Start: 2024-07-03 | End: 2024-09-01

## 2024-07-03 RX ORDER — CLONAZEPAM 0.5 MG/1
.5-1 TABLET, ORALLY DISINTEGRATING ORAL NIGHTLY PRN
Qty: 30 TABLET | Refills: 1 | Status: SHIPPED | OUTPATIENT
Start: 2024-07-03 | End: 2024-09-01

## 2024-07-03 RX ORDER — BUPROPION HYDROCHLORIDE 300 MG/1
300 TABLET ORAL DAILY
Qty: 30 TABLET | Refills: 1 | Status: SHIPPED | OUTPATIENT
Start: 2024-07-03 | End: 2024-09-01

## 2024-07-03 NOTE — PROGRESS NOTES
Outpatient Psychiatry Followup Visit (DO/MD/NP, etc.)    7/3/2024  Assessment & Plan    Assessment - Plan:     Impression     ICD-10-CM ICD-9-CM   1. Mild episode of recurrent major depressive disorder  F33.0 296.31   2. Anxiety disorder, unspecified type  F41.9 300.00   3. Grief  F43.21 309.0   4. Insomnia, unspecified type  G47.00 780.52   5. BMI 27.0-27.9,adult  Z68.27 V85.23        Plan of Care & Medication Management    Chart was reviewed. The risks and benefits of medication were discussed with pt. The treatment plan and followup plan were reviewed with pt. Pt understands to contact clinic if symptoms worsen. Pt understands to call 911 or go to nearest ER for suicidal ideation, intent or plan.   RX History ABILIFY, AMBIEN (somnambulism), ATARAX/VISTARIL, CYMBALTA, DEXEDRINE, KLONOPIN (since 2005), LEXAPRO, TRAZODONE,   WELLBUTRIN XL   Current RX Continue ABILIFY  Metabolic monitoring:  AUG2023 Lipids acceptable  JAN2022 A1c 5.2  Adjustments:  16MAY2024: Increase to 5mg daily  Prior to 5/16/2024, pt had been taking 2mg daily  Continue LEXAPRO  6/17/2024: Discussed standard serotonin syndrome contingency plan: Stop taking and reach out to the clinic for worsening of insomnia, confusion, agitation or restlessness, tremors, rising blood pressure, sweating or rapid heart rate.  Tolerated in the past. Took LEXAPRO for years until MAR2024  Taking SOTALOL for years also, monitor QTc  Adjustments:  17JUN2024: Start 10mg daily after completion of CYMBALTA taper  Continue KLONOPIN  Beers Criteria  Fall risk  Plan to attempt taper at future encounter  Adjustments:  16MAY2024: Continue 0.5-1mg HS  Prior to 5/16/2024, pt had been taking 0.5-1mg HS since 2005  Continue WELLBUTRIN XL  Adjustments:  16MAY2024: Continue 300mg qam  Prior to 5/16/2024, pt had been taking 300mg qam   Education & Counseling RX administration and adherence   Other Orders Continue individual psychotherapy  Pending: CBT-I   Monitor VITAL  "SIGNS  Use of: benzo  sleep  Instruments: PROMIS (A&D), PSS4  MINICOG v.1 5/5 17JUN2024   RETURN K: RETURN IN 4 WEEKS (ONE MONTH), and reassess frequency within three visits from now  Continue medication management     Psychotherapy   Target Symptoms Grief, anger, memory problems   Why chosen therapy is appropriate versus another modality: patient responds to this modality, relevant to diagnosis   Outcome Monitoring observation, self-report   Therapeutic Intervention IPT (interpersonal psychotherapy) for grief, psychoeducation, supportive psychotherapy   Topics and Themes building skills sets for symptom management, grief   Pt Response The patient's response to the intervention is accepting.    Pt Progress The patient's progress toward treatment goals is positive progress.   Duration 18 minutes       Subjective    Interval History:     Available documentation has been reviewed, and pertinent elements of the chart have been incorporated into this note where appropriate. Last Epic encounter with writer was on 6/17/2024   Madisyn Velasquez, a 78 y.o. female, presenting for followup visit.      Since last visit, reports overall A LITTLE BETTER.    EKG reviewed. Normal. Qtc 415    Gaining strength in BLEs with exercise.     Sleeping well     "I think I'm less depressed"    High BP. Reports better BP at home.    NO problems with CYMBALTA taper. Tolerating LEXAPRO, taking for about a week.    Dinners with niece    Anger about 's will; grief. Concerns about memory. Provided psychotherapy and psychoeducation, documentation above.    Doing well, can reduce visit frequency to q1m    Encouraged to meet with external therapist    Will continue treatment otherwise as planned       Unless otherwise specified, pt did NOT display signs of nor endorse symptoms of overt psychosis or acute mood disorder requiring hospitalization during the encounter; pt denied violent thoughts or suicidal or homicidal ideation, intent, or plan.   " "      Objective    Measurement-Based Care (MBC):     Routine Instruments   PROMIS-ANXIETY Interpretation: 5 (4a raw score): T-SCORE 48; NONE TO SLIGHT using 55-60-70 cutoffs.   PROMIS-DEPRESSION Interpretation: 5 (4a raw score): T-SCORE 49.0; NONE TO SLIGHT using 55-60-70 cutoffs.   PSS4 Interpretation: 6/16; MODERATE using 6-11 cutoffs. 0 PH, 0 LSE.   Additional Instruments   N/A     Current Evaluation of Mental Status:     Constitutional / General       Vitals:    07/03/24 1533   BP: (!) 156/75   Pulse: 91   Weight: 68.8 kg (151 lb 10.8 oz)   Height: 5' 2" (1.575 m)       Psychiatric / Mental Status Examination  1. Appearance: Dress is informal but appropriate. Motor activity normal.  2. Discourse: Clear speech with normal rate and volume. Associations intact. Orderly.  3. Emotional Expression: Somewhat anxious and depressed mood. Affect is appropriate, at times tearful.  4. Perception and Thinking: No hallucinations. No suicidality, no homicidality, delusions, or paranoia.  5. Sensorium: Grossly intact. Able to focus for interview.  6. Memory and Fund of Knowledge: Intact for content of interview.  7. Insight and Judgment: Intact.    Neurological / Musculoskeletal / Osteopathic Structural  Gait, Station:  Uses cane.     Auto-populated Chart Data:     Medications  Outpatient Encounter Medications as of 7/3/2024   Medication Sig Dispense Refill    celecoxib (CELEBREX) 200 MG capsule Take 200 mg by mouth.      co-enzyme Q-10 30 mg capsule Take 30 mg by mouth 3 (three) times daily.      ELIQUIS 5 mg Tab Take 5 mg by mouth 2 (two) times daily.      metronidazole 0.75% (METROCREAM) 0.75 % Crea Apply topically nightly as needed. 1 each 0    MYRBETRIQ 25 mg Tb24 ER tablet TAKE 1 TABLET(25 MG) BY MOUTH EVERY DAY (Patient taking differently: Take 50 mg by mouth once daily.) 90 tablet 1    rosuvastatin (CRESTOR) 10 MG tablet Take 1 tablet (10 mg total) by mouth once daily. 30 tablet 3    sotaloL (BETAPACE) 80 MG tablet Take " 40 mg by mouth 2 (two) times daily.      [DISCONTINUED] ARIPiprazole (ABILIFY) 5 MG Tab Take 1 tablet (5 mg total) by mouth once daily. 30 tablet 1    [DISCONTINUED] buPROPion (WELLBUTRIN XL) 300 MG 24 hr tablet Take 1 tablet (300 mg total) by mouth once daily. 30 tablet 1    [DISCONTINUED] clonazePAM (KLONOPIN) 0.5 MG disintegrating tablet Take 1-2 tablets (0.5-1 mg total) by mouth nightly as needed (insomnia). 30 tablet 1    [DISCONTINUED] EScitalopram oxalate (LEXAPRO) 10 MG tablet Take 1 tablet (10 mg total) by mouth every morning. 30 tablet 1    ARIPiprazole (ABILIFY) 5 MG Tab Take 1 tablet (5 mg total) by mouth once daily. 30 tablet 1    buPROPion (WELLBUTRIN XL) 300 MG 24 hr tablet Take 1 tablet (300 mg total) by mouth once daily. 30 tablet 1    clonazePAM (KLONOPIN) 0.5 MG disintegrating tablet Take 1-2 tablets (0.5-1 mg total) by mouth nightly as needed (insomnia). 30 tablet 1    EScitalopram oxalate (LEXAPRO) 10 MG tablet Take 1 tablet (10 mg total) by mouth every morning. 30 tablet 1    [DISCONTINUED] DULoxetine (CYMBALTA) 20 MG capsule Take 2 capsules (40 mg total) by mouth once daily for 5 days, THEN 1 capsule (20 mg total) once daily for 5 days. THEN discontinue and replace with LEXAPRO. (Patient not taking: Reported on 7/3/2024) 15 capsule 0     No facility-administered encounter medications on file as of 7/3/2024.      The chart was reviewed for recent diagnostic procedures and investigations, and pertinent results are noted below.    Wt Readings from Last 2 Encounters:   07/03/24 68.8 kg (151 lb 10.8 oz)   06/17/24 69.9 kg (154 lb 1.6 oz)     BP Readings from Last 1 Encounters:   07/03/24 (!) 156/75     Pulse Readings from Last 1 Encounters:   07/03/24 91        Blood Counts, Electrolytes & Glucose: (i.e. WBC, ANC, Hemoglobin, Hematocrit, MCV, Platelets)  Lab Results   Component Value Date    WBC 5.20 08/29/2023    GRAN 3.3 08/29/2023    GRAN 63.4 08/29/2023    HGB 13.6 08/29/2023    HCT 42.3  "08/29/2023    MCV 93 08/29/2023     08/29/2023     08/29/2023    K 4.2 08/29/2023    CALCIUM 9.4 08/29/2023    PHOS 3.2 01/25/2022    MG 1.9 01/25/2022    CO2 27 08/29/2023    ANIONGAP 10 08/29/2023    GLU 87 08/29/2023    HGBA1C 5.2 01/24/2022       Renal, Liver, Pancreas, Thyroid, Parathyroid, Prolactin, CPK, Lipids & Vitamin Levels: (i.e. Cr, BUN, Anion Gap, GFR, Urine Specific Gravity, Urine Protein, Microalburnin, AST, ALT, GGT, Alk Phos,Total Bili, Total Protein, Albumin, Ammonia, INR, Amylase, Lipase, TSH, Total T3, Total T4, Free T4 PTH, Prolactin, CPK, Cholesterol, Triglycerides, LDH, HDL, Vitamin B12, Folate, Vitamin D)  Lab Results   Component Value Date    CREATININE 0.7 08/29/2023    BUN 10 08/29/2023    EGFRNORACEVR >60.0 08/29/2023    SPECGRAV 1.010 09/01/2022    AST 16 08/29/2023    ALT 14 08/29/2023    ALKPHOS 56 08/29/2023    BILITOT 0.6 08/29/2023    ALBUMIN 3.8 08/29/2023    INR 1.1 01/25/2022    TSH 3.235 08/29/2023    CPK 61 01/23/2022    CHOL 141 08/29/2023    TRIG 70 08/29/2023    LDLCALC 69.0 08/29/2023    HDL 58 08/29/2023    [id number] 1109 (H) 08/29/2023       Infection Diseases, Pregnancy Screenings & Drug Levels: (i.e. Hepatitis Panel, HIV, Syphilis, Urine & Blood Pregnancy Screens, beta hCG, Lithium, Valproic Acid, Carbamazepine, Lamotrigine, Phenytoin, Phenobarbital, Clozapine, Norclozapine, Clozapine + Norclozapine)   No results found for: "HAV", "HEPAIGM", "HEPBIGM", "HEPBCAB", "HBEAG", "HEPCAB", "RAPIDHIV", "HIV1X2", "ERA08YWGR", "TP8MASJA", "ABSOLUTECD4", "RPR", "PREGTESTUR", "PREGSERUM", "HCG", "HCGQUANT", "LITHIUM", "VALPROATE", "CBMZ", "LAMOTRIGINE", "PHENYTOIN", "PHENOBARB", "CLOZAPINE", "NORCLOZAP", "CLOZNORCLOZ"    Addiction: (i.e. Urine Toxicology, Blood Alcohol, PETH, EtG, EtS, CDT, Buprenorphine, Norbuprenorphine)  No results found for: "PCDSOALCOHOL", "PCDSOBENZOD", "BARBITURATES", "PCDSCOMETHA", "OPIATESCREEN", "COCAINEMETAB", "AMPHETAMINES", " ""MARIJUANATHC", "PCDSOPHENCYN", "PCDSUBUPRE", "PCDSUFENTANY", "PCDSUOXYCOD", "PCDSUTRAMA", "[id number]", "PETH", "ALCOHOLMEDIC", "THEYLGLUCU", "ETHYLSULF", "CDT", "BUPRENORPH", "NORBUPRENOR"    Results for orders placed or performed during the hospital encounter of 06/28/24   EKG 12-lead    Collection Time: 06/28/24  3:49 PM   Result Value Ref Range    QRS Duration 90 ms    [facility] QTC Calculation 415 ms    Narrative    Test Reason : F33.0,Z91.89,    Vent. Rate : 080 BPM     Atrial Rate : 080 BPM     P-R Int : 144 ms          QRS Dur : 090 ms      QT Int : 360 ms       P-R-T Axes : -10 -08 013 degrees     QTc Int : 415 ms    Normal sinus rhythm  Normal ECG  Confirmed by Manny Dunlap MD ([id number]) on 7/1/2024 5:38:12 PM    Referred By: YULIYA JACOBO           Confirmed By:Manny Dunlap MD            Billing Documentation:     Method of Encounter IN PERSON visit at the clinic   Type of Encounter Follow up visit with me   Counseling;  Psychotherapy 85998: 18 minutes (with therapy documentation above)   Counseling;  Tobacco and/or Nicotine    Additional Codes and Modifiers 22043, with modifer 59: administered and scored more than one psychological or neuropsychological tests (see MBC above) (16+ mins)   Time Remaining Chart/Pt 06456: FOLLOW UP VISIT, Rx mgmt, "Multiple STABLE chronic illnesses; no changes in treatment at this time"   Total Mins  (7/3/2024) N/A - Not billing for time        Massimo Jacobo, DO  Department of Psychiatry, [facility]        "

## 2024-07-05 ENCOUNTER — TELEPHONE (OUTPATIENT)
Dept: PRIMARY CARE CLINIC | Facility: CLINIC | Age: 79
End: 2024-07-05
Payer: MEDICARE

## 2024-07-05 DIAGNOSIS — K59.00 CONSTIPATION, UNSPECIFIED CONSTIPATION TYPE: ICD-10-CM

## 2024-07-05 DIAGNOSIS — R39.15 URINARY URGENCY: ICD-10-CM

## 2024-07-05 DIAGNOSIS — N39.41 URGE INCONTINENCE: ICD-10-CM

## 2024-07-05 DIAGNOSIS — N39.3 STRESS INCONTINENCE: ICD-10-CM

## 2024-07-05 DIAGNOSIS — R35.0 URINARY FREQUENCY: ICD-10-CM

## 2024-07-05 RX ORDER — MIRABEGRON 25 MG/1
50 TABLET, FILM COATED, EXTENDED RELEASE ORAL DAILY
Qty: 90 TABLET | Refills: 1 | Status: SHIPPED | OUTPATIENT
Start: 2024-07-05 | End: 2024-07-05

## 2024-07-05 RX ORDER — MIRABEGRON 25 MG/1
50 TABLET, FILM COATED, EXTENDED RELEASE ORAL DAILY
Qty: 180 TABLET | Refills: 3 | Status: SHIPPED | OUTPATIENT
Start: 2024-07-05

## 2024-07-05 NOTE — TELEPHONE ENCOUNTER
----- Message from Mckayla Paulino sent at 7/5/2024 11:11 AM CDT -----  Regarding: refill  [phone] - call back ; need refill for       MYRBETRIQ 25 mg Tb24 ER tablet stated it was supposed to be changes as 2 x a day     So she run out. So pharmacy is not refilling it because it's too soon. Need script change to 2 x a day      Send to :    [id number] - [place] - [address] AT [facility] [address]  [address] [address]  Phone: [phone] Fax: [phone]  Hours: Not open 24 hours        Mckayla

## 2024-07-05 NOTE — TELEPHONE ENCOUNTER
Patient states she was instructed to take two 25mg tablets (for a total of 50mg) of Myrbetriq by mouth once daily. Because of this she has run out of her prescription and she needs a refill. LAST REFILL: 5/2/24, LOV: 6/10/24; NOV: 9/13/24

## 2024-07-08 ENCOUNTER — TELEPHONE (OUTPATIENT)
Dept: PRIMARY CARE CLINIC | Facility: CLINIC | Age: 79
End: 2024-07-08
Payer: MEDICARE

## 2024-07-08 NOTE — TELEPHONE ENCOUNTER
Returned call to pt. Appt made for 7/15/2024 at 2:00.    Pt reported continued grief and associated legal concerns.

## 2024-07-08 NOTE — TELEPHONE ENCOUNTER
Patient has been having trouble with constipation. She asked if she could be prescribed lactulose to take for it. She has also been taking Miralax as well.

## 2024-07-08 NOTE — TELEPHONE ENCOUNTER
----- Message from Cally Dinero LCSW sent at 7/8/2024 10:50 AM CDT -----  Regarding: Please call  Pt is requesting a call regarding a prescription. Thanks!

## 2024-07-08 NOTE — TELEPHONE ENCOUNTER
Id like a bit more info. Is the constipation chronic or new. Has she been repeating the miralax? Has she tried an enema/suppository? Prune juice, lactulaose is fine but other measures have failed. Also new constipation needs history to ensure no likelihood of colonic obstruction

## 2024-07-08 NOTE — TELEPHONE ENCOUNTER
Patient states her constipation is chronic. She has not been taking the Miralax daily, but approximately every other day. She refuses to do an enema/suppository. She states prune juice is for somebody who only occasionally has a problem. She agreed to take the Miralax every day.

## 2024-07-09 NOTE — TELEPHONE ENCOUNTER
Sigh. Sounds good. Thank you  Prune juice may help anyone so lets try the basic things before going prescription shall we.   Lets research and try some natural things ie prune juice, hot water in the am, exercise, daily fiber in the form of natural or metamucil etc and do that consistently. Were gonna aim to try to reduce the use of prescription meds and try natural things first.

## 2024-07-15 ENCOUNTER — CLINICAL SUPPORT (OUTPATIENT)
Dept: PRIMARY CARE CLINIC | Facility: CLINIC | Age: 79
End: 2024-07-15
Payer: MEDICARE

## 2024-07-15 DIAGNOSIS — F43.21 GRIEF REACTION: ICD-10-CM

## 2024-07-15 DIAGNOSIS — F41.1 GAD (GENERALIZED ANXIETY DISORDER): ICD-10-CM

## 2024-07-15 DIAGNOSIS — Z65.8 PSYCHOSOCIAL DISTRESS: Primary | ICD-10-CM

## 2024-07-15 PROCEDURE — 99499 UNLISTED E&M SERVICE: CPT | Mod: HCNC,S$GLB,, | Performed by: SOCIAL WORKER
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
## 2024-07-29 ENCOUNTER — CLINICAL SUPPORT (OUTPATIENT)
Dept: PRIMARY CARE CLINIC | Facility: CLINIC | Age: 79
End: 2024-07-29
Payer: MEDICARE

## 2024-07-29 DIAGNOSIS — F41.1 GAD (GENERALIZED ANXIETY DISORDER): ICD-10-CM

## 2024-07-29 DIAGNOSIS — Z65.8 PSYCHOSOCIAL DISTRESS: Primary | ICD-10-CM

## 2024-07-29 DIAGNOSIS — F43.21 GRIEF REACTION: ICD-10-CM

## 2024-07-29 PROCEDURE — 99499 UNLISTED E&M SERVICE: CPT | Mod: HCNC,S$GLB,, | Performed by: SOCIAL WORKER

## 2024-07-31 ENCOUNTER — OFFICE VISIT (OUTPATIENT)
Dept: PSYCHIATRY | Facility: CLINIC | Age: 79
End: 2024-07-31
Payer: MEDICARE

## 2024-07-31 VITALS
WEIGHT: 153.69 LBS | HEIGHT: 62 IN | SYSTOLIC BLOOD PRESSURE: 146 MMHG | HEART RATE: 94 BPM | DIASTOLIC BLOOD PRESSURE: 96 MMHG | BODY MASS INDEX: 28.28 KG/M2

## 2024-07-31 DIAGNOSIS — F43.21 GRIEF: ICD-10-CM

## 2024-07-31 DIAGNOSIS — F33.41 RECURRENT MAJOR DEPRESSIVE DISORDER, IN PARTIAL REMISSION: Primary | ICD-10-CM

## 2024-07-31 DIAGNOSIS — F33.0 MILD EPISODE OF RECURRENT MAJOR DEPRESSIVE DISORDER: ICD-10-CM

## 2024-07-31 DIAGNOSIS — G47.00 INSOMNIA, UNSPECIFIED TYPE: ICD-10-CM

## 2024-07-31 DIAGNOSIS — F41.9 ANXIETY DISORDER, UNSPECIFIED TYPE: ICD-10-CM

## 2024-07-31 PROCEDURE — 96136 PSYCL/NRPSYC TST PHY/QHP 1ST: CPT | Mod: 59,HCNC,S$GLB, | Performed by: STUDENT IN AN ORGANIZED HEALTH CARE EDUCATION/TRAINING PROGRAM

## 2024-07-31 PROCEDURE — 1157F ADVNC CARE PLAN IN RCRD: CPT | Mod: HCNC,CPTII,S$GLB, | Performed by: STUDENT IN AN ORGANIZED HEALTH CARE EDUCATION/TRAINING PROGRAM

## 2024-07-31 PROCEDURE — 1160F RVW MEDS BY RX/DR IN RCRD: CPT | Mod: HCNC,CPTII,S$GLB, | Performed by: STUDENT IN AN ORGANIZED HEALTH CARE EDUCATION/TRAINING PROGRAM

## 2024-07-31 PROCEDURE — 3288F FALL RISK ASSESSMENT DOCD: CPT | Mod: HCNC,CPTII,S$GLB, | Performed by: STUDENT IN AN ORGANIZED HEALTH CARE EDUCATION/TRAINING PROGRAM

## 2024-07-31 PROCEDURE — 3077F SYST BP >= 140 MM HG: CPT | Mod: HCNC,CPTII,S$GLB, | Performed by: STUDENT IN AN ORGANIZED HEALTH CARE EDUCATION/TRAINING PROGRAM

## 2024-07-31 PROCEDURE — 99999 PR PBB SHADOW E&M-EST. PATIENT-LVL III: CPT | Mod: PBBFAC,HCNC,, | Performed by: STUDENT IN AN ORGANIZED HEALTH CARE EDUCATION/TRAINING PROGRAM

## 2024-07-31 PROCEDURE — 1100F PTFALLS ASSESS-DOCD GE2>/YR: CPT | Mod: HCNC,CPTII,S$GLB, | Performed by: STUDENT IN AN ORGANIZED HEALTH CARE EDUCATION/TRAINING PROGRAM

## 2024-07-31 PROCEDURE — 99214 OFFICE O/P EST MOD 30 MIN: CPT | Mod: HCNC,S$GLB,, | Performed by: STUDENT IN AN ORGANIZED HEALTH CARE EDUCATION/TRAINING PROGRAM

## 2024-07-31 PROCEDURE — 3080F DIAST BP >= 90 MM HG: CPT | Mod: HCNC,CPTII,S$GLB, | Performed by: STUDENT IN AN ORGANIZED HEALTH CARE EDUCATION/TRAINING PROGRAM

## 2024-07-31 PROCEDURE — 1159F MED LIST DOCD IN RCRD: CPT | Mod: HCNC,CPTII,S$GLB, | Performed by: STUDENT IN AN ORGANIZED HEALTH CARE EDUCATION/TRAINING PROGRAM

## 2024-07-31 PROCEDURE — 1125F AMNT PAIN NOTED PAIN PRSNT: CPT | Mod: HCNC,CPTII,S$GLB, | Performed by: STUDENT IN AN ORGANIZED HEALTH CARE EDUCATION/TRAINING PROGRAM

## 2024-07-31 RX ORDER — ESCITALOPRAM OXALATE 10 MG/1
10 TABLET ORAL EVERY MORNING
Qty: 30 TABLET | Refills: 1 | Status: SHIPPED | OUTPATIENT
Start: 2024-07-31 | End: 2024-09-29

## 2024-07-31 RX ORDER — CLONAZEPAM 0.5 MG/1
.5-1 TABLET, ORALLY DISINTEGRATING ORAL NIGHTLY PRN
Qty: 30 TABLET | Refills: 1 | Status: SHIPPED | OUTPATIENT
Start: 2024-07-31 | End: 2024-09-29

## 2024-07-31 RX ORDER — BUPROPION HYDROCHLORIDE 300 MG/1
300 TABLET ORAL DAILY
Qty: 30 TABLET | Refills: 1 | Status: SHIPPED | OUTPATIENT
Start: 2024-07-31 | End: 2024-09-29

## 2024-07-31 RX ORDER — ARIPIPRAZOLE 5 MG/1
5 TABLET ORAL DAILY
Qty: 30 TABLET | Refills: 1 | Status: SHIPPED | OUTPATIENT
Start: 2024-07-31 | End: 2024-09-29

## 2024-07-31 NOTE — PROGRESS NOTES
Outpatient Psychiatry Followup Visit (DO/MD/NP, etc.)    7/31/2024  Assessment & Plan    Assessment - Plan:     Impression     ICD-10-CM ICD-9-CM   1. Recurrent major depressive disorder, in partial remission  F33.41 296.35   2. Grief  F43.21 309.0   3. Anxiety disorder, unspecified type  F41.9 300.00   4. Insomnia, unspecified type  G47.00 780.52   5. BMI 28.0-28.9,adult  Z68.28 V85.24        Plan of Care & Medication Management    Chart was reviewed. The risks and benefits of medication were discussed with pt. The treatment plan and followup plan were reviewed with pt. Pt understands to contact clinic if symptoms worsen. Pt understands to call 911 or go to nearest ER for suicidal ideation, intent or plan.   RX History ABILIFY, AMBIEN (somnambulism), ATARAX/VISTARIL, CYMBALTA, DEXEDRINE, KLONOPIN (since 2005), LEXAPRO, TRAZODONE,   WELLBUTRIN XL   Current RX Continue ABILIFY  Metabolic monitoring:  AUG2023 Lipids acceptable  JAN2022 A1c 5.2  Adjustments:  16MAY2024: Increase to 5mg daily  Prior to 5/16/2024, pt had been taking 2mg daily  Continue LEXAPRO  6/17/2024: Discussed standard serotonin syndrome contingency plan: Stop taking and reach out to the clinic for worsening of insomnia, confusion, agitation or restlessness, tremors, rising blood pressure, sweating or rapid heart rate.  Tolerated in the past. Took LEXAPRO for years until MAR2024  Taking SOTALOL for years also, monitor QTc  Adjustments:  17JUN2024: Start 10mg daily after completion of CYMBALTA taper  Continue KLONOPIN  Beers Criteria  Fall risk  Plan to attempt taper at future encounter  Adjustments:  16MAY2024: Continue 0.5-1mg HS  Prior to 5/16/2024, pt had been taking 0.5-1mg HS since 2005  Continue WELLBUTRIN XL  Adjustments:  16MAY2024: Continue 300mg qam  Prior to 5/16/2024, pt had been taking 300mg qam   Education & Counseling RX administration and adherence   Other Orders Continue individual psychotherapy   Monitor VITAL SIGNS  Use of:  "benzo  sleep  Instruments: PROMIS (A&D), PSS4  MINICOG v.1 5/5 [id number]   RETURN R: RETURN IN 8 WEEKS (TWO MONTHS), and reassess frequency within three visits from now  Continue medication management     Subjective    Interval History:     Available documentation has been reviewed, and pertinent elements of the chart have been incorporated into this note where appropriate. Last [facility] encounter with writer was on 7/3/2024   Madisyn Velasquez, a 78 y.o. female, presenting for followup visit.      Trip, fell and hurt foot. Will be seeing physical therapy.    Eating dinner with nieces every night. Going to gym 3d/w.    Engaged in hobbies. Socializing.    Planning on going on a cruise.    Keeping therapy appointments. Grieving better    Spending time with family soon.    Will be great grandmother in the spring.    Likes medications.    Sleeping better.    Mood has improved. MDD now in partial remission.    Can reduce visit frequency to q2m    Continue treatment as planned       Unless otherwise specified, pt did NOT display signs of nor endorse symptoms of overt psychosis or acute mood disorder requiring hospitalization during the encounter; pt denied violent thoughts or suicidal or homicidal ideation, intent, or plan.         Objective    Measurement-Based Care (MBC):     Routine Instruments   PROMIS-ANXIETY Interpretation: 6 (4a raw score): T-SCORE 51.2; NONE TO SLIGHT using 55-60-70 cutoffs.   PROMIS-DEPRESSION Interpretation: 4 (4a raw score): T-SCORE 41.0; NONE TO SLIGHT using 55-60-70 cutoffs.   PSS4 Interpretation: 4/16; LOW using 6-11 cutoffs. 0 PH, 0 LSE.   Additional Instruments   N/A     Current Evaluation of Mental Status:     Constitutional / General       Vitals:    07/31/24 1523   BP: (!) 146/96   Pulse: 94   Weight: 69.7 kg (153 lb 10.6 oz)   Height: 5' 2" (1.575 m)       Psychiatric / Mental Status Examination  1. Appearance: Dress is informal but appropriate. Motor activity normal.  2. Discourse: Clear " "speech with normal rate and volume. Associations intact. Orderly.  3. Emotional Expression: Euthymic mood with appropriate affect.  4. Perception and Thinking: No hallucinations. No suicidality, no homicidality, delusions, or paranoia.  5. Sensorium: Grossly intact. Able to focus for interview.  6. Memory and Fund of Knowledge: Intact for content of interview.  7. Insight and Judgment: Intact.    Neurological / Musculoskeletal / Osteopathic Structural  Gait, Station:  Uses cane.     Auto-populated chart data omitted from this note for brevity.      Billing Documentation:     Method of Encounter IN PERSON visit at the clinic   Type of Encounter Follow up visit with me   Counseling;  Psychotherapy    Counseling;  Tobacco and/or Nicotine    Additional Codes and Modifiers 19476, with modifer 59: administered and scored more than one psychological or neuropsychological tests (see MBC above) (16+ mins)   Time Remaining Chart/Pt 71949: FOLLOW UP VISIT, Rx mgmt, "Multiple STABLE chronic illnesses"   Total Mins  (7/31/2024) N/A - Not billing for time        Massimo Jacobo DO  Department of Psychiatry, [facility]        "

## 2024-08-12 ENCOUNTER — CLINICAL SUPPORT (OUTPATIENT)
Dept: PRIMARY CARE CLINIC | Facility: CLINIC | Age: 79
End: 2024-08-12
Payer: MEDICARE

## 2024-08-12 DIAGNOSIS — F43.21 GRIEF REACTION: ICD-10-CM

## 2024-08-12 DIAGNOSIS — F41.1 GAD (GENERALIZED ANXIETY DISORDER): ICD-10-CM

## 2024-08-12 DIAGNOSIS — Z65.8 PSYCHOSOCIAL DISTRESS: Primary | ICD-10-CM

## 2024-08-12 PROCEDURE — 99499 UNLISTED E&M SERVICE: CPT | Mod: HCNC,S$GLB,, | Performed by: SOCIAL WORKER
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
## 2024-09-09 PROBLEM — Z13.6 SCREENING FOR CARDIOVASCULAR CONDITION: Status: RESOLVED | Noted: 2024-06-10 | Resolved: 2024-09-09

## 2024-09-10 ENCOUNTER — LAB VISIT (OUTPATIENT)
Dept: LAB | Facility: HOSPITAL | Age: 79
End: 2024-09-10
Attending: INTERNAL MEDICINE
Payer: MEDICARE

## 2024-09-10 DIAGNOSIS — R03.0 ELEVATED BLOOD PRESSURE READING: ICD-10-CM

## 2024-09-10 DIAGNOSIS — Z13.6 SCREENING FOR CARDIOVASCULAR CONDITION: ICD-10-CM

## 2024-09-10 DIAGNOSIS — R42 DIZZINESS: ICD-10-CM

## 2024-09-10 DIAGNOSIS — R53.83 FATIGUE, UNSPECIFIED TYPE: ICD-10-CM

## 2024-09-10 LAB
ALBUMIN SERPL BCP-MCNC: 3.9 G/DL (ref 3.5–5.2)
ALP SERPL-CCNC: 59 U/L (ref 55–135)
ALT SERPL W/O P-5'-P-CCNC: 19 U/L (ref 10–44)
ANION GAP SERPL CALC-SCNC: 10 MMOL/L (ref 8–16)
AST SERPL-CCNC: 20 U/L (ref 10–40)
BASOPHILS # BLD AUTO: 0.04 K/UL (ref 0–0.2)
BASOPHILS NFR BLD: 0.9 % (ref 0–1.9)
BILIRUB SERPL-MCNC: 0.5 MG/DL (ref 0.1–1)
BUN SERPL-MCNC: 15 MG/DL (ref 8–23)
CALCIUM SERPL-MCNC: 9.3 MG/DL (ref 8.7–10.5)
CHLORIDE SERPL-SCNC: 106 MMOL/L (ref 95–110)
CHOLEST SERPL-MCNC: 142 MG/DL (ref 120–199)
CHOLEST/HDLC SERPL: 2.3 {RATIO} (ref 2–5)
CO2 SERPL-SCNC: 23 MMOL/L (ref 23–29)
CREAT SERPL-MCNC: 0.9 MG/DL (ref 0.5–1.4)
DIFFERENTIAL METHOD BLD: NORMAL
EOSINOPHIL # BLD AUTO: 0.2 K/UL (ref 0–0.5)
EOSINOPHIL NFR BLD: 4.7 % (ref 0–8)
ERYTHROCYTE [DISTWIDTH] IN BLOOD BY AUTOMATED COUNT: 13.8 % (ref 11.5–14.5)
EST. GFR  (NO RACE VARIABLE): >60 ML/MIN/1.73 M^2
GLUCOSE SERPL-MCNC: 101 MG/DL (ref 70–110)
HCT VFR BLD AUTO: 43.1 % (ref 37–48.5)
HDLC SERPL-MCNC: 62 MG/DL (ref 40–75)
HDLC SERPL: 43.7 % (ref 20–50)
HGB BLD-MCNC: 14.1 G/DL (ref 12–16)
IMM GRANULOCYTES # BLD AUTO: 0.01 K/UL (ref 0–0.04)
IMM GRANULOCYTES NFR BLD AUTO: 0.2 % (ref 0–0.5)
LDLC SERPL CALC-MCNC: 67.6 MG/DL (ref 63–159)
LYMPHOCYTES # BLD AUTO: 1.4 K/UL (ref 1–4.8)
LYMPHOCYTES NFR BLD: 30.8 % (ref 18–48)
MCH RBC QN AUTO: 29.9 PG (ref 27–31)
MCHC RBC AUTO-ENTMCNC: 32.7 G/DL (ref 32–36)
MCV RBC AUTO: 92 FL (ref 82–98)
MONOCYTES # BLD AUTO: 0.6 K/UL (ref 0.3–1)
MONOCYTES NFR BLD: 12.4 % (ref 4–15)
NEUTROPHILS # BLD AUTO: 2.4 K/UL (ref 1.8–7.7)
NEUTROPHILS NFR BLD: 51 % (ref 38–73)
NONHDLC SERPL-MCNC: 80 MG/DL
NRBC BLD-RTO: 0 /100 WBC
PLATELET # BLD AUTO: 161 K/UL (ref 150–450)
PMV BLD AUTO: 11.2 FL (ref 9.2–12.9)
POTASSIUM SERPL-SCNC: 4.4 MMOL/L (ref 3.5–5.1)
PROT SERPL-MCNC: 6.4 G/DL (ref 6–8.4)
RBC # BLD AUTO: 4.71 M/UL (ref 4–5.4)
SODIUM SERPL-SCNC: 139 MMOL/L (ref 136–145)
TRIGL SERPL-MCNC: 62 MG/DL (ref 30–150)
TSH SERPL DL<=0.005 MIU/L-ACNC: 2.83 UIU/ML (ref 0.4–4)
WBC # BLD AUTO: 4.68 K/UL (ref 3.9–12.7)

## 2024-09-10 PROCEDURE — 85025 COMPLETE CBC W/AUTO DIFF WBC: CPT | Mod: HCNC | Performed by: INTERNAL MEDICINE

## 2024-09-10 PROCEDURE — 80053 COMPREHEN METABOLIC PANEL: CPT | Mod: HCNC | Performed by: INTERNAL MEDICINE

## 2024-09-10 PROCEDURE — 84443 ASSAY THYROID STIM HORMONE: CPT | Mod: HCNC | Performed by: INTERNAL MEDICINE

## 2024-09-10 PROCEDURE — 80061 LIPID PANEL: CPT | Mod: HCNC | Performed by: INTERNAL MEDICINE

## 2024-09-10 PROCEDURE — 36415 COLL VENOUS BLD VENIPUNCTURE: CPT | Mod: HCNC,PO | Performed by: INTERNAL MEDICINE

## 2024-09-13 ENCOUNTER — OFFICE VISIT (OUTPATIENT)
Dept: PRIMARY CARE CLINIC | Facility: CLINIC | Age: 79
End: 2024-09-13
Payer: MEDICARE

## 2024-09-13 VITALS
DIASTOLIC BLOOD PRESSURE: 82 MMHG | WEIGHT: 151.25 LBS | SYSTOLIC BLOOD PRESSURE: 132 MMHG | TEMPERATURE: 98 F | OXYGEN SATURATION: 98 % | HEART RATE: 70 BPM | BODY MASS INDEX: 27.66 KG/M2

## 2024-09-13 DIAGNOSIS — I48.11 LONGSTANDING PERSISTENT ATRIAL FIBRILLATION: ICD-10-CM

## 2024-09-13 DIAGNOSIS — Z79.899 POLYPHARMACY: ICD-10-CM

## 2024-09-13 DIAGNOSIS — F33.1 MODERATE EPISODE OF RECURRENT MAJOR DEPRESSIVE DISORDER: ICD-10-CM

## 2024-09-13 DIAGNOSIS — Z65.8 PSYCHOSOCIAL DISTRESS: Primary | ICD-10-CM

## 2024-09-13 DIAGNOSIS — F51.01 PRIMARY INSOMNIA: ICD-10-CM

## 2024-09-13 DIAGNOSIS — Z23 IMMUNIZATION DUE: ICD-10-CM

## 2024-09-13 DIAGNOSIS — I10 PRIMARY HYPERTENSION: ICD-10-CM

## 2024-09-13 DIAGNOSIS — E53.8 VITAMIN B12 DEFICIENCY: ICD-10-CM

## 2024-09-13 DIAGNOSIS — G31.9 CEREBRAL ATROPHY, MILD: ICD-10-CM

## 2024-09-13 DIAGNOSIS — K59.00 CONSTIPATION, UNSPECIFIED CONSTIPATION TYPE: ICD-10-CM

## 2024-09-13 DIAGNOSIS — R41.3 MEMORY LOSS: ICD-10-CM

## 2024-09-13 DIAGNOSIS — K21.9 GASTROESOPHAGEAL REFLUX DISEASE WITHOUT ESOPHAGITIS: ICD-10-CM

## 2024-09-13 PROCEDURE — 99999 PR PBB SHADOW E&M-EST. PATIENT-LVL IV: CPT | Mod: PBBFAC,HCNC,, | Performed by: INTERNAL MEDICINE

## 2024-09-13 RX ORDER — IMIQUIMOD 12.5 MG/.25G
1 CREAM TOPICAL NIGHTLY
COMMUNITY
Start: 2024-08-06

## 2024-09-13 RX ORDER — LACTULOSE 10 G/15ML
10 SOLUTION ORAL 3 TIMES DAILY
Qty: 30 ML | Refills: 3 | Status: SHIPPED | OUTPATIENT
Start: 2024-09-13

## 2024-09-13 RX ORDER — MIRABEGRON 50 MG/1
1 TABLET, FILM COATED, EXTENDED RELEASE ORAL
COMMUNITY
Start: 2024-07-06

## 2024-09-13 RX ORDER — ARIPIPRAZOLE 2 MG/1
2 TABLET ORAL
COMMUNITY
Start: 2024-07-30 | End: 2024-09-13

## 2024-09-13 RX ORDER — DULOXETIN HYDROCHLORIDE 60 MG/1
60 CAPSULE, DELAYED RELEASE ORAL
COMMUNITY
Start: 2024-07-23
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
## 2024-09-16 DIAGNOSIS — K59.00 CONSTIPATION, UNSPECIFIED CONSTIPATION TYPE: Primary | ICD-10-CM

## 2024-09-16 NOTE — TELEPHONE ENCOUNTER
Patient said Lactulose dose was called in wrong, that she needs more called in because she is out.    LOV 9/13/24  NOV 12/80/24

## 2024-09-17 RX ORDER — LACTULOSE 10 G/15ML
10 SOLUTION ORAL 3 TIMES DAILY
Qty: 1010 ML | Refills: 2 | Status: SHIPPED | OUTPATIENT
Start: 2024-09-17

## 2024-10-09 ENCOUNTER — OFFICE VISIT (OUTPATIENT)
Dept: PSYCHIATRY | Facility: CLINIC | Age: 79
End: 2024-10-09
Payer: MEDICARE

## 2024-10-09 ENCOUNTER — TELEPHONE (OUTPATIENT)
Dept: PSYCHIATRY | Facility: CLINIC | Age: 79
End: 2024-10-09
Payer: MEDICARE

## 2024-10-09 VITALS
HEIGHT: 62 IN | SYSTOLIC BLOOD PRESSURE: 150 MMHG | DIASTOLIC BLOOD PRESSURE: 78 MMHG | BODY MASS INDEX: 27.59 KG/M2 | WEIGHT: 149.94 LBS | HEART RATE: 92 BPM

## 2024-10-09 DIAGNOSIS — F41.9 ANXIETY DISORDER, UNSPECIFIED TYPE: ICD-10-CM

## 2024-10-09 DIAGNOSIS — F33.41 RECURRENT MAJOR DEPRESSIVE DISORDER, IN PARTIAL REMISSION: Primary | ICD-10-CM

## 2024-10-09 DIAGNOSIS — G47.00 INSOMNIA, UNSPECIFIED TYPE: ICD-10-CM

## 2024-10-09 DIAGNOSIS — F43.21 GRIEF: ICD-10-CM

## 2024-10-09 PROCEDURE — 1125F AMNT PAIN NOTED PAIN PRSNT: CPT | Mod: HCNC,CPTII,S$GLB, | Performed by: STUDENT IN AN ORGANIZED HEALTH CARE EDUCATION/TRAINING PROGRAM

## 2024-10-09 PROCEDURE — 1159F MED LIST DOCD IN RCRD: CPT | Mod: HCNC,CPTII,S$GLB, | Performed by: STUDENT IN AN ORGANIZED HEALTH CARE EDUCATION/TRAINING PROGRAM

## 2024-10-09 PROCEDURE — 99214 OFFICE O/P EST MOD 30 MIN: CPT | Mod: HCNC,S$GLB,, | Performed by: STUDENT IN AN ORGANIZED HEALTH CARE EDUCATION/TRAINING PROGRAM

## 2024-10-09 PROCEDURE — 3078F DIAST BP <80 MM HG: CPT | Mod: HCNC,CPTII,S$GLB, | Performed by: STUDENT IN AN ORGANIZED HEALTH CARE EDUCATION/TRAINING PROGRAM

## 2024-10-09 PROCEDURE — 1160F RVW MEDS BY RX/DR IN RCRD: CPT | Mod: HCNC,CPTII,S$GLB, | Performed by: STUDENT IN AN ORGANIZED HEALTH CARE EDUCATION/TRAINING PROGRAM

## 2024-10-09 PROCEDURE — 96136 PSYCL/NRPSYC TST PHY/QHP 1ST: CPT | Mod: 59,HCNC,S$GLB, | Performed by: STUDENT IN AN ORGANIZED HEALTH CARE EDUCATION/TRAINING PROGRAM

## 2024-10-09 PROCEDURE — 99999 PR PBB SHADOW E&M-EST. PATIENT-LVL III: CPT | Mod: PBBFAC,HCNC,, | Performed by: STUDENT IN AN ORGANIZED HEALTH CARE EDUCATION/TRAINING PROGRAM

## 2024-10-09 PROCEDURE — 1157F ADVNC CARE PLAN IN RCRD: CPT | Mod: HCNC,CPTII,S$GLB, | Performed by: STUDENT IN AN ORGANIZED HEALTH CARE EDUCATION/TRAINING PROGRAM

## 2024-10-09 PROCEDURE — 3077F SYST BP >= 140 MM HG: CPT | Mod: HCNC,CPTII,S$GLB, | Performed by: STUDENT IN AN ORGANIZED HEALTH CARE EDUCATION/TRAINING PROGRAM

## 2024-10-09 PROCEDURE — 1101F PT FALLS ASSESS-DOCD LE1/YR: CPT | Mod: HCNC,CPTII,S$GLB, | Performed by: STUDENT IN AN ORGANIZED HEALTH CARE EDUCATION/TRAINING PROGRAM

## 2024-10-09 PROCEDURE — 90833 PSYTX W PT W E/M 30 MIN: CPT | Mod: HCNC,S$GLB,, | Performed by: STUDENT IN AN ORGANIZED HEALTH CARE EDUCATION/TRAINING PROGRAM

## 2024-10-09 PROCEDURE — G2211 COMPLEX E/M VISIT ADD ON: HCPCS | Mod: HCNC,S$GLB,, | Performed by: STUDENT IN AN ORGANIZED HEALTH CARE EDUCATION/TRAINING PROGRAM

## 2024-10-09 PROCEDURE — 3288F FALL RISK ASSESSMENT DOCD: CPT | Mod: HCNC,CPTII,S$GLB, | Performed by: STUDENT IN AN ORGANIZED HEALTH CARE EDUCATION/TRAINING PROGRAM

## 2024-10-09 RX ORDER — BUPROPION HYDROCHLORIDE 300 MG/1
300 TABLET ORAL DAILY
Qty: 30 TABLET | Refills: 1 | Status: SHIPPED | OUTPATIENT
Start: 2024-10-09 | End: 2024-12-08

## 2024-10-09 RX ORDER — ARIPIPRAZOLE 5 MG/1
5 TABLET ORAL DAILY
Qty: 30 TABLET | Refills: 1 | Status: SHIPPED | OUTPATIENT
Start: 2024-10-09 | End: 2024-12-08

## 2024-10-09 RX ORDER — ESCITALOPRAM OXALATE 10 MG/1
10 TABLET ORAL EVERY MORNING
Qty: 30 TABLET | Refills: 1 | Status: SHIPPED | OUTPATIENT
Start: 2024-10-09 | End: 2024-12-08

## 2024-10-09 RX ORDER — CLONAZEPAM 0.5 MG/1
.5-1 TABLET, ORALLY DISINTEGRATING ORAL NIGHTLY PRN
Qty: 40 TABLET | Refills: 1 | Status: SHIPPED | OUTPATIENT
Start: 2024-10-09 | End: 2024-12-08

## 2024-10-09 NOTE — PROGRESS NOTES
Outpatient Psychiatry Followup Visit (DO/MD/NP, etc.)    10/9/2024  Assessment & Plan    Assessment - Plan:     Impression     ICD-10-CM ICD-9-CM   1. Recurrent major depressive disorder, in partial remission  F33.41 296.35   2. Grief  F43.21 309.0   3. Anxiety disorder, unspecified type  F41.9 300.00   4. Insomnia, unspecified type  G47.00 780.52   5. BMI 27.0-27.9,adult  Z68.27 V85.23      Plan of Care & Medication Management    Chart was reviewed. The risks and benefits of medication were discussed with pt. The treatment plan and followup plan were reviewed with pt. Pt understands to contact clinic if symptoms worsen. Pt understands to call 911 or go to nearest ER for suicidal ideation, intent or plan.   RX History ABILIFY, AMBIEN (somnambulism), ATARAX/VISTARIL, CYMBALTA, DEXEDRINE, KLONOPIN (since 2005), LEXAPRO, TRAZODONE,   WELLBUTRIN XL   Current RX 10/9/2024: Taking CYMBALTA from unknown prescriber, advised to bring bottles next visit  Continue ABILIFY  Metabolic monitoring:  AUG2023 Lipids acceptable  JAN2022 A1c 5.2  Adjustments:  16MAY2024: Increase to 5mg daily  Prior to 5/16/2024, pt had been taking 2mg daily  Continue LEXAPRO  6/17/2024: Discussed standard serotonin syndrome contingency plan: Stop taking and reach out to the clinic for worsening of insomnia, confusion, agitation or restlessness, tremors, rising blood pressure, sweating or rapid heart rate.  Tolerated in the past. Took LEXAPRO for years until MAR2024  Taking SOTALOL for years also, monitor QTc  Adjustments:  17JUN2024: Start 10mg daily after completion of CYMBALTA taper  Continue KLONOPIN  Beers Criteria  Fall risk  Plan to attempt taper at future encounter  Adjustments:  16MAY2024: Continue 0.5-1mg HS  Prior to 5/16/2024, pt had been taking 0.5-1mg HS since 2005  Continue WELLBUTRIN XL  Adjustments:  16MAY2024: Continue 300mg qam  Prior to 5/16/2024, pt had been taking 300mg qam      Education, Counseling & Monitoring []BOX  "BREATHING  []SLEEP HYGIENE  []THERAPY  [] []CONTRACT 10/9/2024?  [] REVIEWED 10/9/2024?  []NRT  []LABS    []HAYES  []CAFF   []CANNABIS  []AMY []ETOH []GDS []MINICOG []MOCA  []AIMS []ASRS []BI   []PCL5 []L2RTB  []   Other Orders    RETURN K: RETURN IN 4 WEEKS (ONE MONTH), and reassess frequency within three visits from now  Continue medication management     Psychotherapy   Target Symptoms depression   Why chosen therapy is appropriate versus another modality: patient responds to this modality, relevant to diagnosis   Outcome Monitoring observation, self-report   Therapeutic Intervention behavioral activation, psychoeducation, supportive psychotherapy   Topics and Themes building skills sets for symptom management, exercise, symptom recognition   Pt Response The patient's response to the intervention is accepting.    Pt Progress The patient's progress toward treatment goals is positive progress.   Duration 18 minutes       Subjective    Interval History:     Available documentation has been reviewed, and pertinent elements of the chart have been incorporated into this note where appropriate. Last [facility] encounter with writer was on 7/31/2024   Madisyn Velasquez, a 78 y.o. female, presenting for followup visit. This visit was done in person, IN CLINIC.    "Overall, how is your mental health now, compared to our last visit?"   []much better []a little better [x]about the same []a little worse []much worse     "I think my physical condition is getting me down"  "Feeling weak"    Recent Mohs procedure on left cheek near mouth  Recent abx use  Restricted from swimming - her main way to exercise    Anxiety has been getting worse  Low motivation  Spending excessive time in bed most days  Therapy provided and documented above    Recent trip to [place] to visit family  Socialization intact  Spending time with family  NO longer worrying about losing the house    KLONOPIN pill quantity increased to #40 due to dose range " "0.5-1mg  Will continue treatment otherwise as planned    Taking CYMBALTA from an unknown prescriber, but unsure  Discussed risks; will simplify regimen next visit  Meet in 1month - pt will bring bottles       Unless otherwise specified, pt did NOT display signs of nor endorse symptoms of overt psychosis or acute mood disorder requiring hospitalization during the encounter; pt denied violent thoughts or suicidal or homicidal ideation, intent, or plan.         Objective    Measurement-Based Care (MBC):     Routine Instruments   PROMIS-ANXIETY Interpretation: 6 (4a raw score): T-SCORE 51.2; NONE TO SLIGHT using 55-60-70 cutoffs.   PROMIS-DEPRESSION Interpretation: 5 (4a raw score): T-SCORE 49.0; NONE TO SLIGHT using 55-60-70 cutoffs.   PSS4 Interpretation: 06/16; MODERATE using 6-11 cutoffs. 0 PH, 0 LSE.   Additional Instruments   N/A     Current Evaluation of Mental Status:     Constitutional / General       Vitals:    10/09/24 1534   BP: (!) 150/78   Pulse: 92   Weight: 68 kg (149 lb 14.6 oz)   Height: 5' 2" (1.575 m)     (Current body mass index is 27.42 kg/m².)    Psychiatric / Mental Status Examination  1. Appearance: Dress is informal but appropriate. Motor activity normal.  2. Discourse: Clear speech with normal rate and volume. Associations intact. Orderly.  3. Emotional Expression: Somewhat anxious. Affect is appropriate.  4. Perception and Thinking: No hallucinations. No suicidality, no homicidality, delusions, or paranoia.  5. Sensorium: Grossly intact. Able to focus for interview.  6. Memory and Fund of Knowledge: Intact for content of interview.  7. Insight and Judgment: Intact.         Auto-populated chart data omitted from this note for brevity.      Billing Documentation:     Method of Encounter IN PERSON visit at the clinic   Type of Encounter Follow up visit with me   Counseling;  Psychotherapy 54413: 18 minutes (with therapy documentation above)   Counseling;  Tobacco and/or Nicotine    Additional " "Codes and Modifiers 18238, with modifer 59: administered and scored more than one psychological or neuropsychological tests (see MBC above) (16+ mins)  , without modifiers -24,-25,-53: COMPLEXITY: Visit today included increased complexity associated with the care of the episodic problem(s) (multiple psychiatric disorders - see above) addressed and managing the longitudinal care of the patient due to the serious and/or complex managed problem(s) (multiple psychiatric disorders - see above).   Time Remaining Chart/Pt 33287: FOLLOW UP VISIT, Rx mgmt, "Multiple STABLE chronic illnesses; no changes in treatment at this time"   Total Mins  (10/9/2024) N/A - Not billing for time        Massimo Jacobo DO  Department of Psychiatry, [facility]        "

## 2024-10-09 NOTE — PATIENT INSTRUCTIONS
Next visit bring in all bottles of medications, herbals and supplements so we can review them together.    Continue medications as prescribed

## 2024-10-09 NOTE — TELEPHONE ENCOUNTER
"Called PT about scheduling an appointment, but she was not interested in scheduling at this time. She did not want to terminate services because she, "had surgery and could not make it into the clinic."   "

## 2024-10-28 DIAGNOSIS — K59.00 CONSTIPATION, UNSPECIFIED CONSTIPATION TYPE: ICD-10-CM

## 2024-10-28 DIAGNOSIS — R39.15 URINARY URGENCY: ICD-10-CM

## 2024-10-28 DIAGNOSIS — N39.3 STRESS INCONTINENCE: ICD-10-CM

## 2024-10-28 DIAGNOSIS — N39.41 URGE INCONTINENCE: ICD-10-CM

## 2024-10-28 DIAGNOSIS — R35.0 URINARY FREQUENCY: ICD-10-CM

## 2024-10-28 RX ORDER — MIRABEGRON 25 MG/1
50 TABLET, FILM COATED, EXTENDED RELEASE ORAL
Qty: 180 TABLET | Refills: 3 | Status: SHIPPED | OUTPATIENT
Start: 2024-10-28

## 2024-11-14 ENCOUNTER — OFFICE VISIT (OUTPATIENT)
Dept: PSYCHIATRY | Facility: CLINIC | Age: 79
End: 2024-11-14
Payer: MEDICARE

## 2024-11-14 VITALS
DIASTOLIC BLOOD PRESSURE: 77 MMHG | SYSTOLIC BLOOD PRESSURE: 159 MMHG | BODY MASS INDEX: 27.3 KG/M2 | WEIGHT: 148.38 LBS | HEIGHT: 62 IN | HEART RATE: 70 BPM

## 2024-11-14 DIAGNOSIS — F41.9 ANXIETY DISORDER, UNSPECIFIED TYPE: ICD-10-CM

## 2024-11-14 DIAGNOSIS — F33.42 RECURRENT MAJOR DEPRESSIVE DISORDER, IN FULL REMISSION: Primary | ICD-10-CM

## 2024-11-14 DIAGNOSIS — G47.00 INSOMNIA, UNSPECIFIED TYPE: ICD-10-CM

## 2024-11-14 DIAGNOSIS — F43.21 GRIEF: ICD-10-CM

## 2024-11-14 PROCEDURE — 99214 OFFICE O/P EST MOD 30 MIN: CPT | Mod: HCNC,S$GLB,, | Performed by: STUDENT IN AN ORGANIZED HEALTH CARE EDUCATION/TRAINING PROGRAM

## 2024-11-14 PROCEDURE — 1159F MED LIST DOCD IN RCRD: CPT | Mod: HCNC,CPTII,S$GLB, | Performed by: STUDENT IN AN ORGANIZED HEALTH CARE EDUCATION/TRAINING PROGRAM

## 2024-11-14 PROCEDURE — 1157F ADVNC CARE PLAN IN RCRD: CPT | Mod: HCNC,CPTII,S$GLB, | Performed by: STUDENT IN AN ORGANIZED HEALTH CARE EDUCATION/TRAINING PROGRAM

## 2024-11-14 PROCEDURE — 1126F AMNT PAIN NOTED NONE PRSNT: CPT | Mod: HCNC,CPTII,S$GLB, | Performed by: STUDENT IN AN ORGANIZED HEALTH CARE EDUCATION/TRAINING PROGRAM

## 2024-11-14 PROCEDURE — 1160F RVW MEDS BY RX/DR IN RCRD: CPT | Mod: HCNC,CPTII,S$GLB, | Performed by: STUDENT IN AN ORGANIZED HEALTH CARE EDUCATION/TRAINING PROGRAM

## 2024-11-14 PROCEDURE — G2211 COMPLEX E/M VISIT ADD ON: HCPCS | Mod: HCNC,S$GLB,, | Performed by: STUDENT IN AN ORGANIZED HEALTH CARE EDUCATION/TRAINING PROGRAM

## 2024-11-14 PROCEDURE — 3078F DIAST BP <80 MM HG: CPT | Mod: HCNC,CPTII,S$GLB, | Performed by: STUDENT IN AN ORGANIZED HEALTH CARE EDUCATION/TRAINING PROGRAM

## 2024-11-14 PROCEDURE — 3077F SYST BP >= 140 MM HG: CPT | Mod: HCNC,CPTII,S$GLB, | Performed by: STUDENT IN AN ORGANIZED HEALTH CARE EDUCATION/TRAINING PROGRAM

## 2024-11-14 PROCEDURE — 99999 PR PBB SHADOW E&M-EST. PATIENT-LVL IV: CPT | Mod: PBBFAC,HCNC,, | Performed by: STUDENT IN AN ORGANIZED HEALTH CARE EDUCATION/TRAINING PROGRAM

## 2024-11-14 PROCEDURE — 96136 PSYCL/NRPSYC TST PHY/QHP 1ST: CPT | Mod: 59,HCNC,S$GLB, | Performed by: STUDENT IN AN ORGANIZED HEALTH CARE EDUCATION/TRAINING PROGRAM

## 2024-11-14 PROCEDURE — 1101F PT FALLS ASSESS-DOCD LE1/YR: CPT | Mod: HCNC,CPTII,S$GLB, | Performed by: STUDENT IN AN ORGANIZED HEALTH CARE EDUCATION/TRAINING PROGRAM

## 2024-11-14 PROCEDURE — 3288F FALL RISK ASSESSMENT DOCD: CPT | Mod: HCNC,CPTII,S$GLB, | Performed by: STUDENT IN AN ORGANIZED HEALTH CARE EDUCATION/TRAINING PROGRAM

## 2024-11-14 RX ORDER — BUPROPION HYDROCHLORIDE 300 MG/1
300 TABLET ORAL DAILY
Qty: 30 TABLET | Refills: 1 | Status: SHIPPED | OUTPATIENT
Start: 2024-11-14 | End: 2025-01-13

## 2024-11-14 RX ORDER — ESCITALOPRAM OXALATE 10 MG/1
10 TABLET ORAL EVERY MORNING
Qty: 30 TABLET | Refills: 1 | Status: SHIPPED | OUTPATIENT
Start: 2024-11-14 | End: 2025-01-13

## 2024-11-14 RX ORDER — DULOXETINE 40 MG/1
40 CAPSULE, DELAYED RELEASE ORAL EVERY MORNING
Qty: 30 CAPSULE | Refills: 1 | Status: SHIPPED | OUTPATIENT
Start: 2024-11-14 | End: 2024-11-15 | Stop reason: CLARIF

## 2024-11-14 RX ORDER — ARIPIPRAZOLE 5 MG/1
5 TABLET ORAL DAILY
Qty: 30 TABLET | Refills: 1 | Status: SHIPPED | OUTPATIENT
Start: 2024-11-14 | End: 2025-01-13

## 2024-11-14 NOTE — PROGRESS NOTES
Outpatient Psychiatry Followup Visit  11/14/2024  Assessment & Plan    Impression     ICD-10-CM ICD-9-CM   1. Recurrent major depressive disorder, in full remission  F33.42 296.36   2. Grief  F43.21 309.0   3. Anxiety disorder, unspecified type  F41.9 300.00   4. Insomnia, unspecified type  G47.00 780.52   5. BMI 27.0-27.9,adult  Z68.27 V85.23      Plan of Care & Medication Management    Chart was reviewed. The risks and benefits of medication were discussed with pt. The treatment plan and followup plan were reviewed with pt. Pt understands to contact clinic if symptoms worsen. Pt understands to call 911 or go to nearest ER for suicidal ideation, intent or plan. Unless otherwise specified, pt did NOT display signs of nor endorse symptoms of overt psychosis or acute mood disorder requiring hospitalization during the encounter; pt denied violent thoughts or suicidal or homicidal ideation, intent, or plan.   RX History ABILIFY, AMBIEN (somnambulism), ATARAX/VISTARIL, CYMBALTA, DEXEDRINE, KLONOPIN (since 2005), LEXAPRO, TRAZODONE,   WELLBUTRIN XL   Current RX Reduce CYMBALTA  Adjustments:  11/14/2024: Reduce to 40mg daily  Prior to 11/14/2024 pt reports had been taking 60mg daily for at least a year  Continue ABILIFY  Metabolic monitoring:  AUG2023 Lipids acceptable  JAN2022 A1c 5.2  Adjustments:  16MAY2024: Increase to 5mg daily  Prior to 5/16/2024, pt had been taking 2mg daily  Continue LEXAPRO  6/17/2024: Discussed standard serotonin syndrome contingency plan: Stop taking and reach out to the clinic for worsening of insomnia, confusion, agitation or restlessness, tremors, rising blood pressure, sweating or rapid heart rate.  Tolerated in the past. Took LEXAPRO for years until MAR2024  Taking SOTALOL for years also, monitor QTc  Adjustments:  17JUN2024: Start 10mg daily after completion of CYMBALTA taper  Continue KLONOPIN  Beers Criteria  Fall risk  Plan to attempt taper at future  "encounter  Adjustments:  16MAY2024: Continue 0.5-1mg HS  Prior to 5/16/2024, pt had been taking 0.5-1mg HS since 2005  Continue WELLBUTRIN XL  Adjustments:  16MAY2024: Continue 300mg qam  Prior to 5/16/2024, pt had been taking 300mg qam      Education, Counseling & Monitoring []BOX BREATHING  []SLEEP HYGIENE  []THERAPY  [] []CONTRACT 11/14/2024?  [] REVIEWED 11/14/2024?  []NRT  []LABS    []HAYES  []CAFF   []CANNABIS  []AMY []ETOH []GDS []MINICOG []MOCA  []AIMS []ASRS []BI   []PCL5 []L2RTB  []   Other Orders    RETURN L: RETURN IN 4 WEEKS (ONE MONTH), and reassess frequency within two visits from now  Continue medication management     Subjective    Available documentation has been reviewed, and pertinent elements of the chart have been incorporated into this note where appropriate. Last Epic encounter with writer was on 10/9/2024   Madisyn Velasquez, a 79 y.o. female, presenting for followup visit. This visit was done in person, IN CLINIC.    "Overall, how is your mental health now, compared to our last visit?"   []much better []a little better []about the same []a little worse []much worse     Things are going well  Spending time with family    Taking CYMBALTA    BP high, reports at home 124/82    Denies depressed mood  Anxiety is controlled  Sleeping well    NO recent KLONOPIN use     Planning [place] trip to see family    Doing PT  Going to the gym    Strong support system    Seems to be adherent to pharmacotherapy  Discussed reducing CYMBALTA from 60mg to 40mg due to high BP and to hopefully taper to discontinue   Can attempt deprescribing in MAY2025       Objective    Mental Status and Physical Exam  1. Appearance: Dress is informal but appropriate. Motor activity normal.  2. Discourse: Clear speech with normal rate and volume. Associations intact. Orderly.  3. Emotional Expression: Euthymic mood with appropriate affect.  4. Perception and Thinking: No hallucinations. No suicidality, no homicidality, " "delusions, or paranoia.  5. Sensorium: Grossly intact. Able to focus for interview.  6. Memory and Fund of Knowledge: Intact for content of interview.  7. Insight and Judgment: Intact.    Constitutional / General  Vitals:    11/14/24 1514   BP: (!) 159/77   Pulse: 70   Weight: 67.3 kg (148 lb 5.9 oz)   Height: 5' 2" (1.575 m)     (Current body mass index is 27.14 kg/m².)         Measurement-Based Care (MBC):     Routine Instruments   PROMIS-ANXIETY Interpretation: 5 (4a raw score): T-SCORE 48; NONE TO SLIGHT using 55-60-70 cutoffs.   GDS4 Interpretation: 0/4; NEGATIVE for depression   PSS4 Interpretation: 03/16; LOW using 6-11 cutoffs. 0 PH, 0 LSE.   Additional Instruments   N/A       Auto-populated chart data omitted from this note for brevity.      Billing Documentation:     Method of Encounter IN PERSON visit at the clinic, established   E/M Code 13979: FOLLOW UP VISIT, Rx mgmt, "Multiple STABLE chronic illnesses"   Additional Codes and Modifiers     83227, with modifer 59: administered and scored more than one psychological or neuropsychological tests (see MBC above) (16+ mins)  , without modifiers -24,-25,-53: COMPLEXITY: Visit today included increased complexity associated with the care of the episodic problem(s) (multiple psychiatric disorders - see above) addressed and managing the longitudinal care of the patient due to the serious and/or complex managed problem(s) (multiple psychiatric disorders - see above).   Time N/A - Not billing for time        Massimo Jacobo DO  Department of Psychiatry, [facility]        "

## 2024-11-15 DIAGNOSIS — F43.21 GRIEF: ICD-10-CM

## 2024-11-15 DIAGNOSIS — F41.9 ANXIETY DISORDER, UNSPECIFIED TYPE: ICD-10-CM

## 2024-11-15 DIAGNOSIS — F33.41 RECURRENT MAJOR DEPRESSIVE DISORDER, IN PARTIAL REMISSION: Primary | ICD-10-CM

## 2024-11-15 RX ORDER — DULOXETIN HYDROCHLORIDE 20 MG/1
40 CAPSULE, DELAYED RELEASE ORAL DAILY
Qty: 60 CAPSULE | Refills: 1 | Status: SHIPPED | OUTPATIENT
Start: 2024-11-15

## 2024-11-15 NOTE — TELEPHONE ENCOUNTER
The 40 mg duloxetine is not covered by medicare, the only doses that are    Nonformulary Duloxetine 2: The prescriber attests that the requested medication is medically necessary, an exception under Medicare is needed, and Duloxetine 20mg, 30mg, and 60mg (which has the same active ingredient as Duloxetine 40mg) has been tried, failed, or ineffective.

## 2024-11-15 NOTE — TELEPHONE ENCOUNTER
I'm not too sure. The pharmacy won't know until they run it through her particular plan. It's covered but we won't know if they limit how many she can have in one day. Just send it in and cross our fingers.

## 2024-12-16 ENCOUNTER — TELEPHONE (OUTPATIENT)
Dept: PSYCHIATRY | Facility: CLINIC | Age: 79
End: 2024-12-16
Payer: MEDICARE

## 2024-12-16 NOTE — TELEPHONE ENCOUNTER
Rec'd VM on 12/15/24 at 1214. Pt is requesting to cancel appt with Dr. Jacobo today. Covid +. She will call back to reschedule when she is feeling better.

## 2024-12-18 PROBLEM — K59.00 CONSTIPATION: Status: ACTIVE | Noted: 2024-12-18

## 2024-12-18 PROBLEM — F33.1 MODERATE EPISODE OF RECURRENT MAJOR DEPRESSIVE DISORDER: Status: ACTIVE | Noted: 2024-12-18

## 2025-01-03 DIAGNOSIS — N39.41 URGE INCONTINENCE: ICD-10-CM

## 2025-01-03 DIAGNOSIS — K59.00 CONSTIPATION, UNSPECIFIED CONSTIPATION TYPE: ICD-10-CM

## 2025-01-03 DIAGNOSIS — N39.3 STRESS INCONTINENCE: ICD-10-CM

## 2025-01-03 DIAGNOSIS — R39.15 URINARY URGENCY: ICD-10-CM

## 2025-01-03 DIAGNOSIS — R35.0 URINARY FREQUENCY: ICD-10-CM

## 2025-01-03 RX ORDER — MIRABEGRON 25 MG/1
25 TABLET, FILM COATED, EXTENDED RELEASE ORAL
Qty: 90 TABLET | Refills: 3 | Status: SHIPPED | OUTPATIENT
Start: 2025-01-03

## 2025-01-07 DIAGNOSIS — G47.00 INSOMNIA, UNSPECIFIED TYPE: ICD-10-CM

## 2025-01-07 RX ORDER — CLONAZEPAM 0.5 MG/1
.5-1 TABLET, ORALLY DISINTEGRATING ORAL NIGHTLY PRN
Qty: 40 TABLET | Refills: 1 | Status: SHIPPED | OUTPATIENT
Start: 2025-01-07 | End: 2025-01-09 | Stop reason: SDUPTHER

## 2025-01-08 ENCOUNTER — OFFICE VISIT (OUTPATIENT)
Dept: PRIMARY CARE CLINIC | Facility: CLINIC | Age: 80
End: 2025-01-08
Payer: MEDICARE

## 2025-01-08 VITALS
TEMPERATURE: 98 F | WEIGHT: 153.69 LBS | OXYGEN SATURATION: 97 % | SYSTOLIC BLOOD PRESSURE: 138 MMHG | DIASTOLIC BLOOD PRESSURE: 74 MMHG | HEIGHT: 62 IN | BODY MASS INDEX: 28.28 KG/M2 | HEART RATE: 63 BPM

## 2025-01-08 DIAGNOSIS — F51.01 PRIMARY INSOMNIA: ICD-10-CM

## 2025-01-08 DIAGNOSIS — E78.2 MIXED HYPERLIPIDEMIA: ICD-10-CM

## 2025-01-08 DIAGNOSIS — G31.9 CEREBRAL ATROPHY, MILD: ICD-10-CM

## 2025-01-08 DIAGNOSIS — F41.1 GAD (GENERALIZED ANXIETY DISORDER): ICD-10-CM

## 2025-01-08 DIAGNOSIS — I10 PRIMARY HYPERTENSION: ICD-10-CM

## 2025-01-08 DIAGNOSIS — F33.1 MODERATE EPISODE OF RECURRENT MAJOR DEPRESSIVE DISORDER: ICD-10-CM

## 2025-01-08 DIAGNOSIS — K21.9 GASTROESOPHAGEAL REFLUX DISEASE WITHOUT ESOPHAGITIS: ICD-10-CM

## 2025-01-08 DIAGNOSIS — K59.00 CONSTIPATION, UNSPECIFIED CONSTIPATION TYPE: Primary | ICD-10-CM

## 2025-01-08 DIAGNOSIS — I48.11 LONGSTANDING PERSISTENT ATRIAL FIBRILLATION: ICD-10-CM

## 2025-01-08 PROCEDURE — 3288F FALL RISK ASSESSMENT DOCD: CPT | Mod: HCNC,CPTII,S$GLB, | Performed by: INTERNAL MEDICINE

## 2025-01-08 PROCEDURE — 3075F SYST BP GE 130 - 139MM HG: CPT | Mod: HCNC,CPTII,S$GLB, | Performed by: INTERNAL MEDICINE

## 2025-01-08 PROCEDURE — 99999 PR PBB SHADOW E&M-EST. PATIENT-LVL IV: CPT | Mod: PBBFAC,HCNC,, | Performed by: INTERNAL MEDICINE

## 2025-01-08 PROCEDURE — 1123F ACP DISCUSS/DSCN MKR DOCD: CPT | Mod: HCNC,CPTII,S$GLB, | Performed by: INTERNAL MEDICINE

## 2025-01-08 PROCEDURE — 1126F AMNT PAIN NOTED NONE PRSNT: CPT | Mod: HCNC,CPTII,S$GLB, | Performed by: INTERNAL MEDICINE

## 2025-01-08 PROCEDURE — 1101F PT FALLS ASSESS-DOCD LE1/YR: CPT | Mod: HCNC,CPTII,S$GLB, | Performed by: INTERNAL MEDICINE

## 2025-01-08 PROCEDURE — 1159F MED LIST DOCD IN RCRD: CPT | Mod: HCNC,CPTII,S$GLB, | Performed by: INTERNAL MEDICINE

## 2025-01-08 PROCEDURE — 3078F DIAST BP <80 MM HG: CPT | Mod: HCNC,CPTII,S$GLB, | Performed by: INTERNAL MEDICINE

## 2025-01-08 PROCEDURE — 1160F RVW MEDS BY RX/DR IN RCRD: CPT | Mod: HCNC,CPTII,S$GLB, | Performed by: INTERNAL MEDICINE

## 2025-01-08 PROCEDURE — 99214 OFFICE O/P EST MOD 30 MIN: CPT | Mod: HCNC,S$GLB,, | Performed by: INTERNAL MEDICINE

## 2025-01-08 RX ORDER — ROSUVASTATIN CALCIUM 10 MG/1
10 TABLET, COATED ORAL DAILY
Qty: 30 TABLET | Refills: 3 | Status: SHIPPED | OUTPATIENT
Start: 2025-01-08

## 2025-01-08 NOTE — PROGRESS NOTES
"INTERNAL MEDICINE PROGRESS/URGENT CARE NOTE    CHIEF COMPLAINT     Chief Complaint   Patient presents with    Follow-up     Patient is here for a 3 month follow up. She wants to ask Dr. Mays to refill her Rosuvastatin. Her cardiologist is no longer on her insurance plan. She is looking for a new cardiologist.        SONAL Velasquez is a 79 y.o.female who presents with a PMHx of  Afib, HTN, HLD,  Depression/anxiety, GERD, fall with post traumatic SAH who presents today for her routine follow up  visit.      Her only medical concern today:   No concerns. Would like me to take over her crestor as she's in the middle of changing cardiologist. This is perfectly fine by me.       At her previous visits, we had addressed:   Had had two falls. She has been excercising 3 days a week.  On ROS, says constipation. Has tried metamucil and miralax. Iquired about linzess. Discussed side effects and agreed on lactulose. Says It worked for her when her  was on hospice had it and she used it and it worked for her.   worsening depression. Of note, her  who she served as caregiver has recently . she reported that she has been doing "ok". Also dealing with family issues with finances. Has been working with our LCSW for counselling.   Memory loss. Was referred to neurology.      HTN: denies history of HTN. Note chart has multiple elevated readings. She was prescribed norvasc but has not been taking it.   BP today is wnl 132/82     Depression: last completed ++PHQ9 , was 18 on initial visit. Loss of interest in doing most things she previously enjoyed such as going to water aerobics class, insomnia, +change in appetite. Previously took wellbutrin and lexapro but says she has been out of the wellbutrin for months on accident. Recently got a refill of her medication two days and have restarted it so she is waiting for it to take effect.      Repeated falls with recent SAH: two falls were due to tripping over " her husbands oxygen tube     Polypharmacy: long discussion re medication use. Advised against the use of celebrex and eliquis and she agreed to stop the celebrex because it doesn't seem to help her anyways. Advised against the  Chronic long term use of clonipin due to her fall history and being on a NOAC. She will try to wean down off this. She uses it for insomnia, and agrees to trial of trazodone instead while we wean her slowly of the clonazepam. Also advised good sleep hygiene.        chronic forgetfulness. Noted with issues with medication.   Has a lot on her plate and constantly multitasking.   Her  thinks her hearing is not good but she thinks its selective hearing loss  TSH and vitamin B12 labs were ordered at her last visit but not done.   Specifically describes that when she goies into a room ofetn forget when she went in there for. Takes a fruit for her  but then forgets where she put it. Now having issues with major ADLs and iADLs such as bill paying or driving but with minor daily functions.   She does do a pill box. Etc      Insomnia: patient now takes clopinin (was on this prior to my meeting her ut was on 1mg now on only 1/2 taht dose) which is great that weve been able to wean. But she was unsuccessful at weaning lower. Also took the trazodone and christie th makes her sleep ebtter. We tried with one each and she could not fall asleep or stay asleep.       Home Medications:  Prior to Admission medications    Medication Sig Start Date End Date Taking? Authorizing Provider   ARIPiprazole (ABILIFY) 5 MG Tab Take 1 tablet (5 mg total) by mouth once daily. 11/14/24 1/13/25  Massimo Jacobo, DO   buPROPion (WELLBUTRIN XL) 300 MG 24 hr tablet Take 1 tablet (300 mg total) by mouth once daily. 11/14/24 1/13/25  Massimo Jacobo, DO   celecoxib (CELEBREX) 200 MG capsule Take 200 mg by mouth.    Provider, Historical   clonazePAM (KLONOPIN) 0.5 MG disintegrating tablet Take 1-2 tablets  (0.5-1 mg total) by mouth nightly as needed (insomnia). 1/7/25 3/8/25  Massimo Jacobo DO   co-enzyme Q-10 30 mg capsule Take 30 mg by mouth 3 (three) times daily.    Provider, Historical   DULoxetine (CYMBALTA) 20 MG capsule Take 2 capsules (40 mg total) by mouth once daily. 11/15/24   Massimo Jacobo,    ELIQUIS 5 mg Tab Take 5 mg by mouth 2 (two) times daily. 3/8/22   Provider, Historical   EScitalopram oxalate (LEXAPRO) 10 MG tablet Take 1 tablet (10 mg total) by mouth every morning. 11/14/24 1/13/25  Massimo Jacobo,    imiquimod (ALDARA) 5 % cream Apply 1 application  topically every evening. 8/6/24   Provider, Historical   lactulose (CHRONULAC) 20 gram/30 mL Soln Take 15 mLs (10 g total) by mouth 3 (three) times daily. 9/17/24   Ela Mays MD   metronidazole 0.75% (METROCREAM) 0.75 % Crea Apply topically nightly as needed. 6/10/24   Ela Mays MD   MYRBETRIQ 25 mg Tb24 ER tablet TAKE 1 TABLET(25 MG) BY MOUTH EVERY DAY 1/3/25   Ela Mays MD   rosuvastatin (CRESTOR) 10 MG tablet Take 1 tablet (10 mg total) by mouth once daily. 6/7/22   Ela Mays MD   sotaloL (BETAPACE) 80 MG tablet Take 40 mg by mouth 2 (two) times daily.    Provider, Historical       Review of Systems:  Review of Systems    Advance Care Planning     Date: 01/08/2025    Power of   I initiated the process of voluntary advance care planning today and explained the importance of this process to the patient.  I introduced the concept of advance directives to the patient, as well. Then the patient received detailed information about the importance of designating a Health Care Power of  (HCPOA). She was also instructed to communicate with this person about their wishes for future healthcare, should she become sick and lose decision-making capacity. The patient has previously appointed a HCPOA. After our discussion, the patient has decided to complete a HCPOA and  "has appointed her son, health care agent:  on file  & health care agent number:  on file . I encouraged her to communicate with this person about their wishes for future healthcare, should she become sick and lose decision-making capacity.      A total of 10 min was spent on advance care planning, goals of care discussion, emotional support, formulating and communicating prognosis and exploring burden/benefit of various approaches of treatment. This discussion occurred on a fully voluntary basis with the verbal consent of the patient and/or family.         PHYSICAL EXAM     /74 (BP Location: Right arm, Patient Position: Sitting)   Pulse 63   Temp 97.9 °F (36.6 °C) (Oral)   Ht 5' 2" (1.575 m)   Wt 69.7 kg (153 lb 10.6 oz)   SpO2 97%   BMI 28.10 kg/m²     GEN - A+OX4, NAD   HEENT - PERRL, EOMI, OP clear  Neck - No thyromegaly or cervical LAD. No thyroid masses felt.  CV - RRR, no m/r   Chest - CTAB, no wheezing or rhonchi  Abd - S/NT/ND/+BS.   Ext - 2+BDP and radial pulses. No C/C/E.  Skin - No rash.    LABS         ASSESSMENT/PLAN     Madisyn Velasquez is a 79 y.o. female with  1. Constipation, unspecified constipation type  Improved with linzess. Well managed  Advised fiber, fluid and movement which she is pretty good at.   -     CBC Auto Differential; Future; Expected date: 01/08/2025    2. Moderate episode of recurrent major depressive disorder  Mood is good.   Denies SI/HI   -     CBC Auto Differential; Future; Expected date: 01/08/2025    3. Longstanding persistent atrial fibrillation  Overview:  Rate controlled.   Asymptomatic  A/c with eliquis.   Contiue current management    Orders:  -     CBC Auto Differential; Future; Expected date: 01/08/2025    4. Cerebral atrophy, mild  With mild memory loss.  Overview:  Found on CT from 3/22 (went to ER after a fall, found to have a SAH which self resolved). MRI and CT both showed "mild volume loss", consistent with age related cerebral atrophy.       5. " Gastroesophageal reflux disease without esophagitis  Overview:  Well controlled       6. Primary insomnia  Improved with clonipin per psychiatry    7. Primary hypertension  Overview:  BP is well controlled.   Continue current therapy    Orders:  -     Comprehensive Metabolic Panel; Future; Expected date: 01/08/2025  -     TSH; Future; Expected date: 01/08/2025    8. ELIAS (generalized anxiety disorder)  Doing very well  Follows with psychiatry    9. Mixed hyperlipidemia  -     rosuvastatin (CRESTOR) 10 MG tablet; Take 1 tablet (10 mg total) by mouth once daily.  Dispense: 30 tablet; Refill: 3           WORRY SCORE 1    RTC in 6 months, sooner if needed and depending on labs.    Ela Mays MD  Board Certified Internist/Geriatrician  [facility]

## 2025-01-09 ENCOUNTER — OFFICE VISIT (OUTPATIENT)
Dept: PSYCHIATRY | Facility: CLINIC | Age: 80
End: 2025-01-09
Payer: MEDICARE

## 2025-01-09 VITALS
HEART RATE: 78 BPM | HEIGHT: 62 IN | WEIGHT: 152.56 LBS | SYSTOLIC BLOOD PRESSURE: 156 MMHG | BODY MASS INDEX: 28.07 KG/M2 | DIASTOLIC BLOOD PRESSURE: 71 MMHG

## 2025-01-09 DIAGNOSIS — F41.9 ANXIETY DISORDER, UNSPECIFIED TYPE: ICD-10-CM

## 2025-01-09 DIAGNOSIS — F43.21 GRIEF: ICD-10-CM

## 2025-01-09 DIAGNOSIS — F33.42 RECURRENT MAJOR DEPRESSIVE DISORDER, IN FULL REMISSION: Primary | ICD-10-CM

## 2025-01-09 DIAGNOSIS — G47.00 INSOMNIA, UNSPECIFIED TYPE: ICD-10-CM

## 2025-01-09 PROCEDURE — 3077F SYST BP >= 140 MM HG: CPT | Mod: HCNC,CPTII,S$GLB, | Performed by: STUDENT IN AN ORGANIZED HEALTH CARE EDUCATION/TRAINING PROGRAM

## 2025-01-09 PROCEDURE — 99999 PR PBB SHADOW E&M-EST. PATIENT-LVL III: CPT | Mod: PBBFAC,HCNC,, | Performed by: STUDENT IN AN ORGANIZED HEALTH CARE EDUCATION/TRAINING PROGRAM

## 2025-01-09 PROCEDURE — 1160F RVW MEDS BY RX/DR IN RCRD: CPT | Mod: HCNC,CPTII,S$GLB, | Performed by: STUDENT IN AN ORGANIZED HEALTH CARE EDUCATION/TRAINING PROGRAM

## 2025-01-09 PROCEDURE — G2211 COMPLEX E/M VISIT ADD ON: HCPCS | Mod: HCNC,S$GLB,, | Performed by: STUDENT IN AN ORGANIZED HEALTH CARE EDUCATION/TRAINING PROGRAM

## 2025-01-09 PROCEDURE — 3078F DIAST BP <80 MM HG: CPT | Mod: HCNC,CPTII,S$GLB, | Performed by: STUDENT IN AN ORGANIZED HEALTH CARE EDUCATION/TRAINING PROGRAM

## 2025-01-09 PROCEDURE — 3288F FALL RISK ASSESSMENT DOCD: CPT | Mod: HCNC,CPTII,S$GLB, | Performed by: STUDENT IN AN ORGANIZED HEALTH CARE EDUCATION/TRAINING PROGRAM

## 2025-01-09 PROCEDURE — 1157F ADVNC CARE PLAN IN RCRD: CPT | Mod: HCNC,CPTII,S$GLB, | Performed by: STUDENT IN AN ORGANIZED HEALTH CARE EDUCATION/TRAINING PROGRAM

## 2025-01-09 PROCEDURE — 1101F PT FALLS ASSESS-DOCD LE1/YR: CPT | Mod: HCNC,CPTII,S$GLB, | Performed by: STUDENT IN AN ORGANIZED HEALTH CARE EDUCATION/TRAINING PROGRAM

## 2025-01-09 PROCEDURE — 96136 PSYCL/NRPSYC TST PHY/QHP 1ST: CPT | Mod: 59,HCNC,S$GLB, | Performed by: STUDENT IN AN ORGANIZED HEALTH CARE EDUCATION/TRAINING PROGRAM

## 2025-01-09 PROCEDURE — 1125F AMNT PAIN NOTED PAIN PRSNT: CPT | Mod: HCNC,CPTII,S$GLB, | Performed by: STUDENT IN AN ORGANIZED HEALTH CARE EDUCATION/TRAINING PROGRAM

## 2025-01-09 PROCEDURE — 99214 OFFICE O/P EST MOD 30 MIN: CPT | Mod: HCNC,S$GLB,, | Performed by: STUDENT IN AN ORGANIZED HEALTH CARE EDUCATION/TRAINING PROGRAM

## 2025-01-09 PROCEDURE — 1159F MED LIST DOCD IN RCRD: CPT | Mod: HCNC,CPTII,S$GLB, | Performed by: STUDENT IN AN ORGANIZED HEALTH CARE EDUCATION/TRAINING PROGRAM

## 2025-01-09 RX ORDER — LINACLOTIDE 290 UG/1
290 CAPSULE, GELATIN COATED ORAL
COMMUNITY
Start: 2024-11-29

## 2025-01-09 RX ORDER — BUPROPION HYDROCHLORIDE 300 MG/1
300 TABLET ORAL DAILY
Qty: 30 TABLET | Refills: 1 | Status: SHIPPED | OUTPATIENT
Start: 2025-01-09 | End: 2025-03-10

## 2025-01-09 RX ORDER — CLONAZEPAM 0.5 MG/1
.5-1 TABLET, ORALLY DISINTEGRATING ORAL NIGHTLY PRN
Qty: 40 TABLET | Refills: 1 | Status: SHIPPED | OUTPATIENT
Start: 2025-01-09 | End: 2025-03-10

## 2025-01-09 RX ORDER — DULOXETIN HYDROCHLORIDE 20 MG/1
40 CAPSULE, DELAYED RELEASE ORAL DAILY
Qty: 60 CAPSULE | Refills: 1 | Status: SHIPPED | OUTPATIENT
Start: 2025-01-09

## 2025-01-09 RX ORDER — ESCITALOPRAM OXALATE 10 MG/1
10 TABLET ORAL EVERY MORNING
Qty: 30 TABLET | Refills: 1 | Status: SHIPPED | OUTPATIENT
Start: 2025-01-09 | End: 2025-03-10

## 2025-01-09 RX ORDER — ARIPIPRAZOLE 5 MG/1
5 TABLET ORAL DAILY
Qty: 30 TABLET | Refills: 1 | Status: SHIPPED | OUTPATIENT
Start: 2025-01-09 | End: 2025-03-10

## 2025-01-09 RX ORDER — LINACLOTIDE 145 UG/1
145 CAPSULE, GELATIN COATED ORAL
COMMUNITY
Start: 2024-10-28

## 2025-01-09 NOTE — PROGRESS NOTES
Outpatient Psychiatry Followup Visit  1/9/2025  Assessment & Plan    Impression     ICD-10-CM ICD-9-CM   1. Recurrent major depressive disorder, in full remission  F33.42 296.36   2. Grief  F43.21 309.0   3. Anxiety disorder, unspecified type  F41.9 300.00   4. Insomnia, unspecified type  G47.00 780.52   5. BMI 27.0-27.9,adult  Z68.27 V85.23      Plan of Care & Medication Management    Chart was reviewed. The risks and benefits of medication were discussed with pt. The treatment plan and followup plan were reviewed with pt. Pt understands to contact clinic if symptoms worsen. Pt understands to call 911 or go to nearest ER for suicidal ideation, intent or plan. Unless otherwise specified, pt did NOT display signs of nor endorse symptoms of overt psychosis or acute mood disorder requiring hospitalization during the encounter; pt denied violent thoughts or suicidal or homicidal ideation, intent, or plan.   RX History ABILIFY, AMBIEN (somnambulism), ATARAX/VISTARIL, CYMBALTA, DEXEDRINE, KLONOPIN (since 2005), LEXAPRO, TRAZODONE,   WELLBUTRIN XL   Current RX 1/9/2025: Plan trial to deprescribe in MAY2025  Continue ABILIFY  Metabolic monitoring:  SEP2024 Lipids acceptable  AUG2023 Lipids acceptable  JAN2022 A1c 5.2  Adjustments:  16MAY2024: Increase to 5mg daily  Prior to 5/16/2024, pt had been taking 2mg daily  Continue CYMBALTA  Adjustments:  11/14/2024: Reduce to 40mg daily  Prior to 11/14/2024 pt reports had been taking 60mg daily for at least a year  Continue KLONOPIN  Adjustments:  16MAY2024: Continue 0.5-1mg HS  Prior to 5/16/2024, pt had been taking 0.5-1mg HS since 2005  Continue LEXAPRO  Adjustments:  17JUN2024: Start 10mg daily after completion of CYMBALTA taper  Continue WELLBUTRIN XL  Adjustments:  16MAY2024: Continue 300mg qam  Prior to 5/16/2024, pt had been taking 300mg qam      Education, Counseling & Monitoring []SLEEP HYGIENE  []THERAPY  []CONTRACT 1/9/2025?  [] REVIEWED 1/9/2025?  []NRT  []  "  Other Orders    RETURN R: STANDARD PROTOCOL: RETURN IN 8 WEEKS (TWO MONTHS)       Subjective    Available documentation has been reviewed, and pertinent elements of the chart have been incorporated into this note where appropriate. Last Epic encounter with writer was on 11/14/2024   Madisyn Velasquez, a 79 y.o. female, presenting for followup visit. This visit was done in person, IN CLINIC.      Had COVID during cruise, but had fun    "I think things are looking up"    Doing  work  Believe she has found direction and purpose  Socialization intact  Going out with friends  "Keeping busy"    Mood is stable  Denies depressed mood  Anxiety is controlled  Sleeping well with KLONOPIN    Seems to be adherent to pharmacotherapy  Reduce visit frequency to q2m  Will continue treatment otherwise as planned        Objective    Mental Status and Physical Exam  1. Appearance: Dress is informal but appropriate. Motor activity normal.  2. Discourse: Clear speech with normal rate and volume. Associations intact. Orderly.  3. Emotional Expression: Euthymic mood with appropriate affect.  4. Perception and Thinking: No hallucinations. No suicidality, no homicidality, delusions, or paranoia.  5. Sensorium: Grossly intact. Able to focus for interview.  6. Memory and Fund of Knowledge: Intact for content of interview.  7. Insight and Judgment: Intact.    Constitutional / General  Vitals:    01/09/25 1615   BP: (!) 156/71   Pulse: 78   Weight: 69.2 kg (152 lb 8.9 oz)   Height: 5' 2" (1.575 m)     (Current body mass index is 27.9 kg/m².)         Measurement-Based Care (MBC):     Routine Instruments   PROMIS-ANXIETY Interpretation: 4 (4a raw score): T-SCORE 40.3; NONE TO SLIGHT using 55-60-70 cutoffs.   GDS4 Interpretation: 0/4; NEGATIVE for depression   PSS4 Interpretation: 02/16; LOW using 6-11 cutoffs. 0 PH, 0 LSE.   Additional Instruments   MBC ANCHOR : a little better         Auto-populated chart data omitted from this " "note for brevity.      Billing Documentation:     Method of Encounter IN PERSON visit at the clinic, established   E/M Code 29868: FOLLOW UP VISIT, Rx mgmt, "Multiple STABLE chronic illnesses; no changes in treatment at this time"   Additional Codes and Modifiers     42560, with modifer 59: administered and scored more than one psychological or neuropsychological tests (see MBC above) (16+ mins)  , without modifiers -24,-25,-53: COMPLEXITY: Visit today included increased complexity associated with the care of the episodic problem(s) (multiple psychiatric disorders - see above) addressed and managing the longitudinal care of the patient due to the serious and/or complex managed problem(s) (multiple psychiatric disorders - see above).   Time N/A - Not billing for time        Massimo Jacobo DO  Department of Psychiatry, [facility]        "

## 2025-01-28 DIAGNOSIS — Z92.29 HX OF LONG TERM USE OF BLOOD THINNERS: Primary | ICD-10-CM

## 2025-01-28 DIAGNOSIS — N39.3 STRESS INCONTINENCE: ICD-10-CM

## 2025-01-28 DIAGNOSIS — E78.2 MIXED HYPERLIPIDEMIA: ICD-10-CM

## 2025-01-28 DIAGNOSIS — R39.15 URINARY URGENCY: ICD-10-CM

## 2025-01-28 DIAGNOSIS — N39.41 URGE INCONTINENCE: ICD-10-CM

## 2025-01-28 DIAGNOSIS — R35.0 URINARY FREQUENCY: ICD-10-CM

## 2025-01-28 DIAGNOSIS — K59.00 CONSTIPATION, UNSPECIFIED CONSTIPATION TYPE: ICD-10-CM

## 2025-01-28 DIAGNOSIS — I48.11 LONGSTANDING PERSISTENT ATRIAL FIBRILLATION: ICD-10-CM

## 2025-01-28 RX ORDER — SOTALOL HYDROCHLORIDE 80 MG/1
40 TABLET ORAL 2 TIMES DAILY
Qty: 90 TABLET | Refills: 1 | Status: CANCELLED | OUTPATIENT
Start: 2025-01-28 | End: 2025-07-27

## 2025-01-28 NOTE — TELEPHONE ENCOUNTER
----- Message from Mckayla sent at 1/28/2025 12:40 PM CST -----  Regarding: script needed  [phone] - call back ; need her cardiac meds       ELIQUIS 5 mg Tab           otaloL (BETAPACE) 80 MG tablet        Send to :      [id number] - [place] - [address] AT [facility] [address]  [address] [address]  Phone: [phone] Fax: [phone]  Hours: Not open 24 hours        Mckayla

## 2025-01-29 RX ORDER — SOTALOL HYDROCHLORIDE 80 MG/1
40 TABLET ORAL 2 TIMES DAILY
Qty: 30 TABLET | Refills: 0 | Status: SHIPPED | OUTPATIENT
Start: 2025-01-29 | End: 2025-02-28

## 2025-01-29 RX ORDER — APIXABAN 5 MG/1
5 TABLET, FILM COATED ORAL 2 TIMES DAILY
Qty: 180 TABLET | Refills: 1 | Status: SHIPPED | OUTPATIENT
Start: 2025-01-29 | End: 2025-07-28

## 2025-01-29 RX ORDER — ROSUVASTATIN CALCIUM 10 MG/1
10 TABLET, COATED ORAL DAILY
Qty: 90 TABLET | Refills: 1 | Status: SHIPPED | OUTPATIENT
Start: 2025-01-29 | End: 2025-07-28

## 2025-01-29 NOTE — TELEPHONE ENCOUNTER
Called patient to let her know she needs cardiologist to refill Sotalol, and patient said she no longer sees one because of her insurance. Patient was advised to reach out to her insurance for providers covered under her plan, as I don't have that info. Patient voiced understanding and would like a short term refill on the Sotalol until she can establish with another cardiologist.    Also, patient is struggling with excessive urinary leakage daily on Myrbetriq. Patient wants to know if she can take Rx BID instead of QD, as she is now having to wear Depends to manage the leakage. Please advise.    Lastly, please refill Crestor to Aj in [place].    LOV 1/8/25  NOV 5/7/25

## 2025-01-29 NOTE — TELEPHONE ENCOUNTER
Called patient and gave her MD recommendations. Patient said she actually started taking Myrbetriq BID x2 days ago and she's noticed a huge difference in her incontinence. Patient was advised to follow up if she has anymore issues for a referral to urology.

## 2025-01-29 NOTE — TELEPHONE ENCOUNTER
May take two tabs as the max dose is 50mg daily. If not effective after that, she will likely need to consult with urology to see if anything else can be done. And sadly sometimes it is necessary to have to use incontinence pads to help with incontinence.

## 2025-02-13 ENCOUNTER — SOCIAL WORK (OUTPATIENT)
Dept: PRIMARY CARE CLINIC | Facility: CLINIC | Age: 80
End: 2025-02-13
Payer: MEDICARE

## 2025-02-13 DIAGNOSIS — F43.21 GRIEF REACTION: Primary | ICD-10-CM

## 2025-02-13 NOTE — PROGRESS NOTES
Chart review:  Pt was last seen by clinician 8/2024. MSW Intern spoke to pt, who said she does not want to terminate services. Clinician is closing this episode of BHI, but not terminating services with clinician.

## 2025-03-05 ENCOUNTER — TELEPHONE (OUTPATIENT)
Dept: CARDIOLOGY | Facility: CLINIC | Age: 80
End: 2025-03-05
Payer: MEDICARE

## 2025-03-05 DIAGNOSIS — I48.11 LONGSTANDING PERSISTENT ATRIAL FIBRILLATION: ICD-10-CM

## 2025-03-05 RX ORDER — SOTALOL HYDROCHLORIDE 80 MG/1
40 TABLET ORAL 2 TIMES DAILY
Qty: 60 TABLET | Refills: 0 | Status: SHIPPED | OUTPATIENT
Start: 2025-03-05 | End: 2025-05-04

## 2025-03-05 NOTE — TELEPHONE ENCOUNTER
I've ever heard of a patient being unable to find a cardiologist. And id like to not have this conversation again in a month. Will send in a months supply.

## 2025-03-05 NOTE — TELEPHONE ENCOUNTER
Called patient and she has already called [facility] for cardiologists in network, who are all booked up at [facility] and she is on a wait list to see one at a few different [facility] locations. Patient is almost out of her Sotalol and knows Dr Mays preferred not to fill it, but admits to procrastinating and is now almost out of Rx. Patient is waiting on someone to call her for an appointment and would like Dr Mays to give her a short term supply of Rx until she finds another cardiologist. Ok for Sotalol refill?    LOV 1/8/25  NOV 5/7/25

## 2025-03-05 NOTE — TELEPHONE ENCOUNTER
----- Message from Mckayla sent at 3/5/2025  2:24 PM CST -----  Regarding: pt advice - cardilogist referral  [phone] - call back ; need referral to see a new Cardiologist that will take her insurance which is [employer]. Mckayla

## 2025-03-05 NOTE — TELEPHONE ENCOUNTER
----- Message from Eh sent at 3/5/2025  2:10 PM CST -----  Type:  Sooner Apoointment RequestCaller is requesting a sooner appointment.   Name of Caller: ptWhen is the first available appointment?dept. bookedWould the patient rather a call back or a response via [facility]? Call back Best Call Back Number: [phone] Additional Information: please call to discuss.

## 2025-03-12 ENCOUNTER — OFFICE VISIT (OUTPATIENT)
Dept: PSYCHIATRY | Facility: CLINIC | Age: 80
End: 2025-03-12
Payer: MEDICARE

## 2025-03-12 VITALS
BODY MASS INDEX: 27.6 KG/M2 | SYSTOLIC BLOOD PRESSURE: 145 MMHG | HEART RATE: 75 BPM | DIASTOLIC BLOOD PRESSURE: 66 MMHG | WEIGHT: 150 LBS | HEIGHT: 62 IN

## 2025-03-12 DIAGNOSIS — F43.21 GRIEF: ICD-10-CM

## 2025-03-12 DIAGNOSIS — F33.42 RECURRENT MAJOR DEPRESSIVE DISORDER, IN FULL REMISSION: Primary | ICD-10-CM

## 2025-03-12 DIAGNOSIS — F41.9 ANXIETY DISORDER, UNSPECIFIED TYPE: ICD-10-CM

## 2025-03-12 DIAGNOSIS — G47.00 INSOMNIA, UNSPECIFIED TYPE: ICD-10-CM

## 2025-03-12 PROCEDURE — 3078F DIAST BP <80 MM HG: CPT | Mod: HCNC,CPTII,S$GLB, | Performed by: STUDENT IN AN ORGANIZED HEALTH CARE EDUCATION/TRAINING PROGRAM

## 2025-03-12 PROCEDURE — 96136 PSYCL/NRPSYC TST PHY/QHP 1ST: CPT | Mod: 59,HCNC,S$GLB, | Performed by: STUDENT IN AN ORGANIZED HEALTH CARE EDUCATION/TRAINING PROGRAM

## 2025-03-12 PROCEDURE — 3288F FALL RISK ASSESSMENT DOCD: CPT | Mod: HCNC,CPTII,S$GLB, | Performed by: STUDENT IN AN ORGANIZED HEALTH CARE EDUCATION/TRAINING PROGRAM

## 2025-03-12 PROCEDURE — 1157F ADVNC CARE PLAN IN RCRD: CPT | Mod: HCNC,CPTII,S$GLB, | Performed by: STUDENT IN AN ORGANIZED HEALTH CARE EDUCATION/TRAINING PROGRAM

## 2025-03-12 PROCEDURE — 1160F RVW MEDS BY RX/DR IN RCRD: CPT | Mod: HCNC,CPTII,S$GLB, | Performed by: STUDENT IN AN ORGANIZED HEALTH CARE EDUCATION/TRAINING PROGRAM

## 2025-03-12 PROCEDURE — 99999 PR PBB SHADOW E&M-EST. PATIENT-LVL III: CPT | Mod: PBBFAC,HCNC,, | Performed by: STUDENT IN AN ORGANIZED HEALTH CARE EDUCATION/TRAINING PROGRAM

## 2025-03-12 PROCEDURE — 99214 OFFICE O/P EST MOD 30 MIN: CPT | Mod: HCNC,S$GLB,, | Performed by: STUDENT IN AN ORGANIZED HEALTH CARE EDUCATION/TRAINING PROGRAM

## 2025-03-12 PROCEDURE — 1101F PT FALLS ASSESS-DOCD LE1/YR: CPT | Mod: HCNC,CPTII,S$GLB, | Performed by: STUDENT IN AN ORGANIZED HEALTH CARE EDUCATION/TRAINING PROGRAM

## 2025-03-12 PROCEDURE — G2211 COMPLEX E/M VISIT ADD ON: HCPCS | Mod: HCNC,S$GLB,, | Performed by: STUDENT IN AN ORGANIZED HEALTH CARE EDUCATION/TRAINING PROGRAM

## 2025-03-12 PROCEDURE — 3077F SYST BP >= 140 MM HG: CPT | Mod: HCNC,CPTII,S$GLB, | Performed by: STUDENT IN AN ORGANIZED HEALTH CARE EDUCATION/TRAINING PROGRAM

## 2025-03-12 PROCEDURE — 1159F MED LIST DOCD IN RCRD: CPT | Mod: HCNC,CPTII,S$GLB, | Performed by: STUDENT IN AN ORGANIZED HEALTH CARE EDUCATION/TRAINING PROGRAM

## 2025-03-12 RX ORDER — ARIPIPRAZOLE 5 MG/1
5 TABLET ORAL DAILY
Qty: 30 TABLET | Refills: 1 | Status: SHIPPED | OUTPATIENT
Start: 2025-03-12 | End: 2025-05-11

## 2025-03-12 RX ORDER — MIRABEGRON 50 MG/1
1 TABLET, FILM COATED, EXTENDED RELEASE ORAL
COMMUNITY
Start: 2025-02-09

## 2025-03-12 RX ORDER — ESCITALOPRAM OXALATE 10 MG/1
10 TABLET ORAL EVERY MORNING
Qty: 30 TABLET | Refills: 1 | Status: SHIPPED | OUTPATIENT
Start: 2025-03-12 | End: 2025-05-11

## 2025-03-12 RX ORDER — DULOXETIN HYDROCHLORIDE 20 MG/1
40 CAPSULE, DELAYED RELEASE ORAL DAILY
Qty: 60 CAPSULE | Refills: 1 | Status: SHIPPED | OUTPATIENT
Start: 2025-03-12

## 2025-03-12 RX ORDER — BUPROPION HYDROCHLORIDE 300 MG/1
300 TABLET ORAL DAILY
Qty: 30 TABLET | Refills: 1 | Status: SHIPPED | OUTPATIENT
Start: 2025-03-12 | End: 2025-05-11

## 2025-03-12 RX ORDER — CLONAZEPAM 0.5 MG/1
.5-1 TABLET, ORALLY DISINTEGRATING ORAL NIGHTLY PRN
Qty: 40 TABLET | Refills: 1 | Status: SHIPPED | OUTPATIENT
Start: 2025-03-12 | End: 2025-05-11

## 2025-03-12 NOTE — PROGRESS NOTES
Outpatient Psychiatry Followup Visit  3/12/2025  Assessment & Plan    Impression     ICD-10-CM ICD-9-CM   1. Recurrent major depressive disorder, in full remission  F33.42 296.36   2. Grief  F43.21 309.0   3. Anxiety disorder, unspecified type  F41.9 300.00   4. Insomnia, unspecified type  G47.00 780.52   5. BMI 27.0-27.9,adult  Z68.27 V85.23      Plan of Care & Medication Management    Chart was reviewed. The risks and benefits of medication were discussed with pt. The treatment plan and followup plan were reviewed with pt. Pt understands to contact clinic if symptoms worsen. Pt understands to call 911 or go to nearest ER for suicidal ideation, intent or plan. Unless otherwise specified, pt did NOT display signs of nor endorse symptoms of overt psychosis or acute mood disorder requiring hospitalization during the encounter; pt denied violent thoughts or suicidal or homicidal ideation, intent, or plan.   RX History ABILIFY, AMBIEN (somnambulism), ATARAX/VISTARIL, CYMBALTA, DEXEDRINE, KLONOPIN (since 2005), LEXAPRO, TRAZODONE,   WELLBUTRIN XL   Current RX 1/9/2025: Plan trial to deprescribe in MAY2025  Continue ABILIFY  Metabolic monitoring:  SEP2024 Lipids acceptable  AUG2023 Lipids acceptable  JAN2022 A1c 5.2  Adjustments:  16MAY2024: Increase to 5mg daily  Prior to 5/16/2024, pt had been taking 2mg daily  Continue CYMBALTA  Adjustments:  11/14/2024: Reduce to 40mg daily  Prior to 11/14/2024 pt reports had been taking 60mg daily for at least a year  Continue KLONOPIN  Adjustments:  16MAY2024: Continue 0.5-1mg HS  Prior to 5/16/2024, pt had been taking 0.5-1mg HS since 2005  Continue LEXAPRO  Adjustments:  17JUN2024: Start 10mg daily   Continue WELLBUTRIN XL  Adjustments:  16MAY2024: Continue 300mg qam  Prior to 5/16/2024, pt had been taking 300mg qam      Education, Counseling & Monitoring []SLEEP HYGIENE  []THERAPY  []CONTRACT 3/12/2025?  [] REVIEWED 3/12/2025?  []NRT  []   Other Orders    RETURN S:  "STANDARD PROTOCOL: RETURN IN 8 WEEKS (TWO MONTHS)  EXTENDED FOR ONE HOUR (MOCA and DEPRESCRIBING)     Subjective    Available documentation has been reviewed, and pertinent elements of the chart have been incorporated into this note where appropriate. Last Epic encounter with writer was on 1/9/2025   Madisyn Velasquez, a 79 y.o. female, presenting for followup visit. This visit was done in person, IN CLINIC.      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 Appearance: Dress is informal but appropriate. Motor activity normal.  2. Discourse: Clear speech with normal rate and volume. Associations intact. Orderly.  3. Emotional Expression: Euthymic mood with appropriate affect.  4. Perception and Thinking: No hallucinations. No suicidality, no homicidality, delusions, or paranoia.  5. Sensorium: Grossly intact. Able to focus for interview.  6. Memory and Fund of Knowledge: Intact for content of interview.  7. Insight and Judgment: Intact.       Measurement-Based Care (MBC):     Routine Instruments   PROMIS-ANXIETY Interpretation: 4 (4a raw score): T-SCORE 40.3; NONE TO SLIGHT using 55-60-70 cutoffs.   GDS4 Interpretation: 0/4; NEGATIVE for depression   PSS4 Interpretation: 01/16; LOW using 6-11 cutoffs. 0 PH, 0 LSE.   Additional Instruments      " "      Auto-populated chart data omitted from this note for brevity.      Billing Documentation:     Method of Encounter IN PERSON visit at the clinic, established   E/M Code 48772: FOLLOW UP VISIT, Rx mgmt, "Multiple STABLE chronic illnesses"   Additional Codes and Modifiers     84529, with modifer 59: administered and scored more than one psychological or neuropsychological tests (see MBC above) (16+ mins)  , without modifiers -24,-25,-53: COMPLEXITY: Visit today included increased complexity associated with the care of the episodic problem(s) (multiple psychiatric disorders - see above) addressed and managing the longitudinal care of the patient due to the serious and/or complex managed problem(s) (multiple psychiatric disorders - see above).   Time N/A - Not billing for time        Massimo Jacobo DO  Department of Psychiatry, [facility]        "

## 2025-04-17 ENCOUNTER — OFFICE VISIT (OUTPATIENT)
Dept: CARDIOLOGY | Facility: CLINIC | Age: 80
End: 2025-04-17
Payer: MEDICARE

## 2025-04-17 VITALS
SYSTOLIC BLOOD PRESSURE: 133 MMHG | BODY MASS INDEX: 27.84 KG/M2 | OXYGEN SATURATION: 98 % | HEART RATE: 80 BPM | WEIGHT: 152.19 LBS | DIASTOLIC BLOOD PRESSURE: 74 MMHG

## 2025-04-17 DIAGNOSIS — I48.0 PAROXYSMAL ATRIAL FIBRILLATION: Primary | ICD-10-CM

## 2025-04-17 DIAGNOSIS — I10 PRIMARY HYPERTENSION: ICD-10-CM

## 2025-04-17 DIAGNOSIS — Z79.01 LONG TERM (CURRENT) USE OF ANTICOAGULANTS: ICD-10-CM

## 2025-04-17 PROCEDURE — 99999 PR PBB SHADOW E&M-EST. PATIENT-LVL IV: CPT | Mod: PBBFAC,HCNC,, | Performed by: STUDENT IN AN ORGANIZED HEALTH CARE EDUCATION/TRAINING PROGRAM

## 2025-04-17 PROCEDURE — 3078F DIAST BP <80 MM HG: CPT | Mod: HCNC,CPTII,S$GLB, | Performed by: STUDENT IN AN ORGANIZED HEALTH CARE EDUCATION/TRAINING PROGRAM

## 2025-04-17 PROCEDURE — 99204 OFFICE O/P NEW MOD 45 MIN: CPT | Mod: HCNC,S$GLB,, | Performed by: STUDENT IN AN ORGANIZED HEALTH CARE EDUCATION/TRAINING PROGRAM

## 2025-04-17 PROCEDURE — 1157F ADVNC CARE PLAN IN RCRD: CPT | Mod: HCNC,CPTII,S$GLB, | Performed by: STUDENT IN AN ORGANIZED HEALTH CARE EDUCATION/TRAINING PROGRAM

## 2025-04-17 PROCEDURE — 1101F PT FALLS ASSESS-DOCD LE1/YR: CPT | Mod: HCNC,CPTII,S$GLB, | Performed by: STUDENT IN AN ORGANIZED HEALTH CARE EDUCATION/TRAINING PROGRAM

## 2025-04-17 PROCEDURE — 3075F SYST BP GE 130 - 139MM HG: CPT | Mod: HCNC,CPTII,S$GLB, | Performed by: STUDENT IN AN ORGANIZED HEALTH CARE EDUCATION/TRAINING PROGRAM

## 2025-04-17 PROCEDURE — 1159F MED LIST DOCD IN RCRD: CPT | Mod: HCNC,CPTII,S$GLB, | Performed by: STUDENT IN AN ORGANIZED HEALTH CARE EDUCATION/TRAINING PROGRAM

## 2025-04-17 PROCEDURE — 1160F RVW MEDS BY RX/DR IN RCRD: CPT | Mod: HCNC,CPTII,S$GLB, | Performed by: STUDENT IN AN ORGANIZED HEALTH CARE EDUCATION/TRAINING PROGRAM

## 2025-04-17 PROCEDURE — 3288F FALL RISK ASSESSMENT DOCD: CPT | Mod: HCNC,CPTII,S$GLB, | Performed by: STUDENT IN AN ORGANIZED HEALTH CARE EDUCATION/TRAINING PROGRAM

## 2025-04-17 NOTE — PROGRESS NOTES
Part of this note has been created using [employer] dictation system. Errors in transcription may not be completely avoided. Please do not hesitate to contact me.    Patient ID:  Madisyn Velasquez  79 y.o.  female      Assessment:     1. Paroxysmal atrial fibrillation    2. Primary hypertension    3. Long term (current) use of anticoagulants        Plan:     Stable from cardiac standpoint.  Maintaining sinus rhythm. Explained the nature of a-fib, including its tendency to go in and out of rhythm.  Continue Eliquis 5 mg b.i.d. for CVA prophylaxis in the setting of atrial fibrillation. Continue sotalol 40 mg b.i.d.  Discussed ASCVD risk factor control and lifestyle interventions including diet and exercise.Continue current exercise regimen, including water aerobics and weight training, elevate legs to address minor swelling at end of day.  Continue Crestor 10 mg daily.  Will try to obtain records from Dr. Mendoza/Darrell.  Will order any testing if necessary for surveillance after review of previous records.      Subjective:     Chief Complaint   Patient presents with    [facility] Care    Atrial Fibrillation     HISTORY OF PRESENT ILLNESS  Patient is an 79-year-old female with a history of paroxysmal atrial fibrillation, hyperlipidemia, insomnia and anxiety/depression.  She is a former patient of Dr. Mendoza/Darrell. She reports having white coat hypertension. Says blood pressure is slightly elevated during office visits but at home it is always within normal range.  She was diagnosed with atrial fibrillation around 2021. She says she had a single episode at that time lasting 3-4 hours that resolved at the arrival to the ER. She has been on Sotalol and Eliquis since then. No major bleeding issues. Occasionally has mild bruises. She experiences occasional transient palpitations but the episodes are brief and not much bothersome. Denies any dizziness, syncope or near-syncope. Reports no chest pain, dyspnea, orthopnea or PND.   She currently attends physical therapy and gym sessions, participating in water aerobics and exercises with weights. She reports some decline of flexibility and strength attributing it to the fact that her physical activity decreased during COVID-19 pandemic due to isolating to protect her  who since passed away recently. She reports slight LE swelling (L>R) at the end of the day, which improves with leg elevation.  She had 2 falls in the past from tipping over her 's oxygen tube and SAH and subdural hematoma from it.    EKGs today shows sinus rhythm and no significant ST-T abnormalities.    Most Recent EKG Results  Results for orders placed or performed during the hospital encounter of 24   EKG 12-lead    Collection Time: 24  3:49 PM   Result Value Ref Range    QRS Duration 90 ms    [facility] QTC Calculation 415 ms    Narrative    Test Reason : F33.0,Z91.89,    Vent. Rate : 080 BPM     Atrial Rate : 080 BPM     P-R Int : 144 ms          QRS Dur : 090 ms      QT Int : 360 ms       P-R-T Axes : -10 -08 013 degrees     QTc Int : 415 ms    Normal sinus rhythm  Normal ECG  Confirmed by Fabi BOLANOS, Manny Leal ([id number]) on 2024 5:38:12 PM    Referred By: YULIYA LAZARO           Confirmed By:Manny Dunlap MD         Review of patient's allergies indicates:  No Known Allergies    Past Medical History:   Diagnosis Date    Anxiety     Constipation     Depression     Insomnia      Past Surgical History:   Procedure Laterality Date    ADENOIDECTOMY      as a child     SECTION      COSMETIC SURGERY      breast augmentation    FRACTURE SURGERY      arm    HIP REPLACEMENT ARTHROPLASTY      JOINT REPLACEMENT      left hip, right knee    REVISION OF TOTAL KNEE REPLACEMENT       TONSILLECTOMY       Social History[1]       REVIEW OF SYSTEMS  CONSTITUTIONAL: No chills.   EYES: No double vision  NEURO: No alteration in mental status  RESPIRATORY: No wheezing.    CARDIOVASCULAR: See HPI   GI: No  "melena/hematochezia/hematemesis, no diarrhea, no nausea or vomiting.   : No dysuria and frequency, no hematuria  SKIN: No sloughing  PSYCHIATRIC: No hallucinations  ENDOCRINE: no polyphagia  MUSCULOSKELETAL: no muscle weakness    Objective:        Vitals:    04/17/25 0921   BP: 133/74   Pulse: 80       PHYSICAL EXAM  CONSTITUTIONAL: In no apparent distress  HEENT: Normocephalic. Pupils normal and conjunctivae normal.   LUNGS: B/L air entry to the lungs  HEART: Normal rate and regular rhythm. Normal S1 and S2.   ABDOMEN: soft, non-tender  EXTREMITIES: No edema.   NEURO: AAO X 3, no gross sensory or motor deficits  SKIN:  Intact  Psych:  Normal affect      Lab Results   Component Value Date    WBC 4.68 09/10/2024    HGB 14.1 09/10/2024    HCT 43.1 09/10/2024     09/10/2024    CHOL 142 09/10/2024    TRIG 62 09/10/2024    HDL 62 09/10/2024    ALT 19 09/10/2024    AST 20 09/10/2024     09/10/2024    K 4.4 09/10/2024     09/10/2024    CREATININE 0.9 09/10/2024    BUN 15 09/10/2024    CO2 23 09/10/2024    TSH 2.829 09/10/2024    INR 1.1 01/25/2022    HGBA1C 5.2 01/24/2022        @  Lab Results   Component Value Date    CHOL 142 09/10/2024    CHOL 141 08/29/2023    CHOL 145 01/24/2022     Lab Results   Component Value Date    HDL 62 09/10/2024    HDL 58 08/29/2023    HDL 68 01/24/2022     Lab Results   Component Value Date    LDLCALC 67.6 09/10/2024    LDLCALC 69.0 08/29/2023    LDLCALC 66.4 01/24/2022     No results found for: "DLDL"  Lab Results   Component Value Date    TRIG 62 09/10/2024    TRIG 70 08/29/2023    TRIG 53 01/24/2022           Current Outpatient Medications   Medication Instructions    ARIPiprazole (ABILIFY) 5 mg, Oral, Daily    buPROPion (WELLBUTRIN XL) 300 mg, Oral, Daily    clonazePAM (KLONOPIN) 0.5-1 mg, Oral, Nightly PRN    DULoxetine (CYMBALTA) 40 mg, Oral, Daily    ELIQUIS 5 mg, Oral, 2 times daily    EScitalopram oxalate (LEXAPRO) 10 mg, Oral, Every morning    LINZESS 290 mcg "    metronidazole 0.75% (METROCREAM) 0.75 % Crea Topical (Top), Nightly PRN    MYRBETRIQ 50 mg Tb24 1 tablet    rosuvastatin (CRESTOR) 10 mg, Oral, Daily    sotaloL (BETAPACE) 40 mg, Oral, 2 times daily             All pertinent labs, imaging, and EKGs reviewed.  Patient's most recent EKG tracing was personally interpreted by this provider.        This note was generated with the assistance of ambient listening technology. Verbal consent was obtained by the patient and accompanying visitor(s) for the recording of patient appointment to facilitate this note. I attest to having reviewed and edited the generated note for accuracy, though some syntax or spelling errors may persist. Please contact the author of this note for any clarification.            [1]   Social History  Tobacco Use    Smoking status: Never    Smokeless tobacco: Never   Substance Use Topics    Alcohol use: Yes     Alcohol/week: 10.0 standard drinks of alcohol     Types: 10 Glasses of wine per week    Drug use: Never

## 2025-05-01 ENCOUNTER — LAB VISIT (OUTPATIENT)
Dept: LAB | Facility: HOSPITAL | Age: 80
End: 2025-05-01
Attending: INTERNAL MEDICINE
Payer: MEDICARE

## 2025-05-01 DIAGNOSIS — I10 PRIMARY HYPERTENSION: ICD-10-CM

## 2025-05-01 DIAGNOSIS — K59.00 CONSTIPATION, UNSPECIFIED CONSTIPATION TYPE: ICD-10-CM

## 2025-05-01 DIAGNOSIS — F33.1 MODERATE EPISODE OF RECURRENT MAJOR DEPRESSIVE DISORDER: ICD-10-CM

## 2025-05-01 DIAGNOSIS — I48.11 LONGSTANDING PERSISTENT ATRIAL FIBRILLATION: ICD-10-CM

## 2025-05-01 LAB
ABSOLUTE EOSINOPHIL (OHS): 0.13 K/UL
ABSOLUTE MONOCYTE (OHS): 0.46 K/UL (ref 0.3–1)
ABSOLUTE NEUTROPHIL COUNT (OHS): 3.06 K/UL (ref 1.8–7.7)
ALBUMIN SERPL BCP-MCNC: 3.8 G/DL (ref 3.5–5.2)
ALP SERPL-CCNC: 56 UNIT/L (ref 40–150)
ALT SERPL W/O P-5'-P-CCNC: 12 UNIT/L (ref 10–44)
ANION GAP (OHS): 8 MMOL/L (ref 8–16)
AST SERPL-CCNC: 17 UNIT/L (ref 11–45)
BASOPHILS # BLD AUTO: 0.04 K/UL
BASOPHILS NFR BLD AUTO: 0.8 %
BILIRUB SERPL-MCNC: 0.7 MG/DL (ref 0.1–1)
BUN SERPL-MCNC: 14 MG/DL (ref 8–23)
CALCIUM SERPL-MCNC: 9.3 MG/DL (ref 8.7–10.5)
CHLORIDE SERPL-SCNC: 105 MMOL/L (ref 95–110)
CO2 SERPL-SCNC: 26 MMOL/L (ref 23–29)
CREAT SERPL-MCNC: 0.7 MG/DL (ref 0.5–1.4)
ERYTHROCYTE [DISTWIDTH] IN BLOOD BY AUTOMATED COUNT: 13.5 % (ref 11.5–14.5)
GFR SERPLBLD CREATININE-BSD FMLA CKD-EPI: >60 ML/MIN/1.73/M2
GLUCOSE SERPL-MCNC: 100 MG/DL (ref 70–110)
HCT VFR BLD AUTO: 43.3 % (ref 37–48.5)
HGB BLD-MCNC: 14.1 GM/DL (ref 12–16)
IMM GRANULOCYTES # BLD AUTO: 0.01 K/UL (ref 0–0.04)
IMM GRANULOCYTES NFR BLD AUTO: 0.2 % (ref 0–0.5)
LYMPHOCYTES # BLD AUTO: 1.44 K/UL (ref 1–4.8)
MCH RBC QN AUTO: 30.4 PG (ref 27–31)
MCHC RBC AUTO-ENTMCNC: 32.6 G/DL (ref 32–36)
MCV RBC AUTO: 93 FL (ref 82–98)
NUCLEATED RBC (/100WBC) (OHS): 0 /100 WBC
PLATELET # BLD AUTO: 189 K/UL (ref 150–450)
PMV BLD AUTO: 11.1 FL (ref 9.2–12.9)
POTASSIUM SERPL-SCNC: 3.9 MMOL/L (ref 3.5–5.1)
PROT SERPL-MCNC: 6.8 GM/DL (ref 6–8.4)
RBC # BLD AUTO: 4.64 M/UL (ref 4–5.4)
RELATIVE EOSINOPHIL (OHS): 2.5 %
RELATIVE LYMPHOCYTE (OHS): 28 % (ref 18–48)
RELATIVE MONOCYTE (OHS): 8.9 % (ref 4–15)
RELATIVE NEUTROPHIL (OHS): 59.6 % (ref 38–73)
SODIUM SERPL-SCNC: 139 MMOL/L (ref 136–145)
TSH SERPL-ACNC: 2.94 UIU/ML (ref 0.4–4)
WBC # BLD AUTO: 5.14 K/UL (ref 3.9–12.7)

## 2025-05-01 PROCEDURE — 36415 COLL VENOUS BLD VENIPUNCTURE: CPT | Mod: HCNC,PO

## 2025-05-01 PROCEDURE — 84443 ASSAY THYROID STIM HORMONE: CPT | Mod: HCNC

## 2025-05-01 PROCEDURE — 85025 COMPLETE CBC W/AUTO DIFF WBC: CPT | Mod: HCNC

## 2025-05-01 PROCEDURE — 82040 ASSAY OF SERUM ALBUMIN: CPT | Mod: HCNC

## 2025-05-06 DIAGNOSIS — I48.11 LONGSTANDING PERSISTENT ATRIAL FIBRILLATION: ICD-10-CM

## 2025-05-07 ENCOUNTER — OFFICE VISIT (OUTPATIENT)
Dept: PRIMARY CARE CLINIC | Facility: CLINIC | Age: 80
End: 2025-05-07
Payer: MEDICARE

## 2025-05-07 DIAGNOSIS — K59.00 CONSTIPATION, UNSPECIFIED CONSTIPATION TYPE: ICD-10-CM

## 2025-05-07 DIAGNOSIS — F51.01 PRIMARY INSOMNIA: ICD-10-CM

## 2025-05-07 DIAGNOSIS — Z13.31 POSITIVE DEPRESSION SCREENING: ICD-10-CM

## 2025-05-07 DIAGNOSIS — E78.2 MIXED HYPERLIPIDEMIA: ICD-10-CM

## 2025-05-07 DIAGNOSIS — F33.1 MODERATE EPISODE OF RECURRENT MAJOR DEPRESSIVE DISORDER: Primary | ICD-10-CM

## 2025-05-07 DIAGNOSIS — G47.00 INSOMNIA, UNSPECIFIED TYPE: ICD-10-CM

## 2025-05-07 DIAGNOSIS — I48.11 LONGSTANDING PERSISTENT ATRIAL FIBRILLATION: ICD-10-CM

## 2025-05-07 DIAGNOSIS — E53.8 VITAMIN B12 DEFICIENCY: ICD-10-CM

## 2025-05-07 DIAGNOSIS — K21.9 GASTROESOPHAGEAL REFLUX DISEASE WITHOUT ESOPHAGITIS: ICD-10-CM

## 2025-05-07 RX ORDER — ROSUVASTATIN CALCIUM 10 MG/1
10 TABLET, COATED ORAL DAILY
Qty: 90 TABLET | Refills: 1 | Status: SHIPPED | OUTPATIENT
Start: 2025-05-07 | End: 2025-11-03

## 2025-05-07 RX ORDER — SOTALOL HYDROCHLORIDE 80 MG/1
40 TABLET ORAL 2 TIMES DAILY
Qty: 60 TABLET | Refills: 0 | Status: CANCELLED | OUTPATIENT
Start: 2025-05-07 | End: 2025-07-06

## 2025-05-07 RX ORDER — SOTALOL HYDROCHLORIDE 80 MG/1
TABLET ORAL
Qty: 30 TABLET | OUTPATIENT
Start: 2025-05-07

## 2025-05-07 NOTE — PROGRESS NOTES
"INTERNAL MEDICINE PROGRESS/URGENT CARE NOTE    CHIEF COMPLAINT     Chief Complaint   Patient presents with    Follow-up     Patient is being seen virtually for a follow up. She was bitten on her finger by her goat on 25.    Depression     Patient reports she has been procrastinating and she has been napping in the afternoons for hours at a time.     Hip Pain     Patient reports hip pain, bilat 2/10.        HPI     Madisyn Velasquez is a 79 y.o.female who presents with a PMHx of  Afib, HTN, HLD,  Depression/anxiety, GERD, fall with post traumatic SAH who presents today for her routine follow up  visit.   This was a virtual visit. Audiovisual  She is currently in the [facility].      Her only medical concern today:   Worsening depression. Has an appointment with her psychiatrist in 2 days.   Chronic b/l hip pain. Has completed PT, last done 6 weeks ago. Had an injection from ortho but it didn't help.           At her previous visits, we had addressed:   Had had two falls. She has been excercising 3 days a week.  On ROS, says constipation. Has tried metamucil and miralax. Iquired about linzess. Discussed side effects and agreed on lactulose. Says It worked for her when her  was on hospice had it and she used it and it worked for her.   worsening depression. Of note, her  who she served as caregiver has recently . she reported that she has been doing "ok". Also dealing with family issues with finances. Has been working with our LCSW for counselling.   Memory loss. Was referred to neurology.      HTN: denies history of HTN. Note chart has multiple elevated readings. She was prescribed norvasc but has not been taking it.   BP today is wnl 136/88     Depression: last completed ++PHQ9 , was 18 on initial visit. Loss of interest in doing most things she previously enjoyed such as going to water aerobics class, insomnia, +change in appetite. Previously took wellbutrin and lexapro but says she " has been out of the wellbutrin for months on accident. Recently got a refill of her medication two days and have restarted it so she is waiting for it to take effect.      Repeated falls with recent SAH: two falls were due to tripping over her husbands oxygen tube     Polypharmacy: long discussion re medication use. Advised against the use of celebrex and eliquis and she agreed to stop the celebrex because it doesn't seem to help her anyways. Advised against the  Chronic long term use of clonipin due to her fall history and being on a NOAC. She will try to wean down off this. She uses it for insomnia, and agrees to trial of trazodone instead while we wean her slowly of the clonazepam. Also advised good sleep hygiene.        chronic forgetfulness. Noted with issues with medication.   Has a lot on her plate and constantly multitasking.   Her  thinks her hearing is not good but she thinks its selective hearing loss  TSH and vitamin B12 labs were ordered at her last visit but not done.   Specifically describes that when she goies into a room ofetn forget when she went in there for. Takes a fruit for her  but then forgets where she put it. Now having issues with major ADLs and iADLs such as bill paying or driving but with minor daily functions.   She does do a pill box. Etc      Insomnia: patient now takes clopinin (was on this prior to my meeting her ut was on 1mg now on only 1/2 taht dose) which is great that weve been able to wean. But she was unsuccessful at weaning lower. Also took the trazodone and christie th makes her sleep ebtter. We tried with one each and she could not fall asleep or stay asleep.       Home Medications:  Prior to Admission medications    Medication Sig Start Date End Date Taking? Authorizing Provider   ARIPiprazole (ABILIFY) 5 MG Tab Take 1 tablet (5 mg total) by mouth once daily. 3/12/25 5/11/25  Massimo Jacobo, DO   buPROPion (WELLBUTRIN XL) 300 MG 24 hr tablet Take 1 tablet  "(300 mg total) by mouth once daily. 3/12/25 5/11/25  Massimo Jacobo DO   clonazePAM (KLONOPIN) 0.5 MG disintegrating tablet Take 1-2 tablets (0.5-1 mg total) by mouth nightly as needed (insomnia). 3/12/25 5/11/25  Massimo Jacobo DO   DULoxetine (CYMBALTA) 20 MG capsule Take 2 capsules (40 mg total) by mouth once daily. 3/12/25   Massimo Jacobo,    ELIQUIS 5 mg Tab Take 1 tablet (5 mg total) by mouth 2 (two) times daily. 1/29/25 7/28/25  Ela Mays MD   EScitalopram oxalate (LEXAPRO) 10 MG tablet Take 1 tablet (10 mg total) by mouth every morning. 3/12/25 5/11/25  Massimo Jacobo,    LINZESS 290 mcg Cap capsule Take 290 mcg by mouth. 11/29/24   Provider, Historical   metronidazole 0.75% (METROCREAM) 0.75 % Crea Apply topically nightly as needed. 6/10/24   Ela Mays MD   MYRBETRIQ 50 mg Tb24 Take 1 tablet by mouth. 2/9/25   Provider, Historical   rosuvastatin (CRESTOR) 10 MG tablet Take 1 tablet (10 mg total) by mouth once daily. 1/29/25 7/28/25  Ela Mays MD   sotaloL (BETAPACE) 80 MG tablet Take 0.5 tablets (40 mg total) by mouth 2 (two) times daily. 3/5/25 5/4/25  Ela Mays MD       Review of Systems:  Review of Systems      PHYSICAL EXAM     There were no vitals taken for this visit.    GEN - A+OX4, NAD   HEENT - PERRL, EOMI, OP clear  Neck - No thyromegaly or cervical LAD. No thyroid masses felt.  CV - RRR, no m/r   Chest - CTAB, no wheezing or rhonchi  Abd - S/NT/ND/+BS.   Ext - 2+BDP and radial pulses. No C/C/E.  Skin - No rash.    LABS         ASSESSMENT/PLAN     Madisyn Velasquez is a 79 y.o. female with  1. Moderate episode of recurrent major depressive disorder  Worsening  Denies SI/HI  PSYCHIATRY is adjusting her medications.she is due to be seen in 2 days.   She continues to exercise and socialize with her friends. Advised to continue to do so as its very commendable.     2. Insomnia, unspecified type  Doing "ok" takes " clonipin prescribed by psychiatry     3. Vitamin B12 deficiency  Continue with supplement    4. Primary insomnia  Sleeping well     5. Gastroesophageal reflux disease without esophagitis  Overview:  Well controlled       6. Constipation, unspecified constipation type  Does well when she sticks with the metamucil as well a sthe linzess but sometimes forget the fiber   Overview:  Improved with linzess. Well managed  Advised fiber, fluid and movement which she is pretty good at.       7. Mixed hyperlipidemia  -     rosuvastatin (CRESTOR) 10 MG tablet; Take 1 tablet (10 mg total) by mouth once daily.  Dispense: 90 tablet; Refill: 1    8. Longstanding persistent atrial fibrillation  Overview:  Rate controlled.   Asymptomatic  A/c with eliquis.   Contiue current management  Sotalol per cardiology             WORRY SCORE 2  Ela Mays MD  Board Certified Internist/Geriatrician  [facility]      I have used clinical judgement based on duration and functional status to consider definite necessity for treatment. Follow up with psychiatry

## 2025-05-09 ENCOUNTER — OFFICE VISIT (OUTPATIENT)
Dept: PSYCHIATRY | Facility: CLINIC | Age: 80
End: 2025-05-09
Payer: MEDICARE

## 2025-05-09 VITALS
HEIGHT: 62 IN | WEIGHT: 150.69 LBS | BODY MASS INDEX: 27.73 KG/M2 | SYSTOLIC BLOOD PRESSURE: 128 MMHG | DIASTOLIC BLOOD PRESSURE: 78 MMHG

## 2025-05-09 DIAGNOSIS — G47.00 INSOMNIA, UNSPECIFIED TYPE: ICD-10-CM

## 2025-05-09 DIAGNOSIS — F33.42 RECURRENT MAJOR DEPRESSIVE DISORDER, IN FULL REMISSION: Primary | ICD-10-CM

## 2025-05-09 DIAGNOSIS — F41.9 ANXIETY DISORDER, UNSPECIFIED TYPE: ICD-10-CM

## 2025-05-09 DIAGNOSIS — F43.21 GRIEF: ICD-10-CM

## 2025-05-09 PROCEDURE — 99999 PR PBB SHADOW E&M-EST. PATIENT-LVL III: CPT | Mod: PBBFAC,HCNC,, | Performed by: STUDENT IN AN ORGANIZED HEALTH CARE EDUCATION/TRAINING PROGRAM

## 2025-05-09 RX ORDER — ESCITALOPRAM OXALATE 10 MG/1
10 TABLET ORAL EVERY MORNING
Qty: 30 TABLET | Refills: 3 | Status: SHIPPED | OUTPATIENT
Start: 2025-05-09 | End: 2025-07-08

## 2025-05-09 RX ORDER — BUPROPION HYDROCHLORIDE 300 MG/1
300 TABLET ORAL DAILY
Qty: 30 TABLET | Refills: 3 | Status: SHIPPED | OUTPATIENT
Start: 2025-05-09 | End: 2025-07-08

## 2025-05-09 RX ORDER — CLONAZEPAM 0.5 MG/1
.5-1 TABLET, ORALLY DISINTEGRATING ORAL NIGHTLY PRN
Qty: 40 TABLET | Refills: 3 | Status: SHIPPED | OUTPATIENT
Start: 2025-05-09 | End: 2025-07-08

## 2025-05-09 RX ORDER — ARIPIPRAZOLE 5 MG/1
5 TABLET ORAL DAILY
Qty: 30 TABLET | Refills: 3 | Status: SHIPPED | OUTPATIENT
Start: 2025-05-09 | End: 2025-07-08

## 2025-05-09 RX ORDER — DULOXETIN HYDROCHLORIDE 20 MG/1
40 CAPSULE, DELAYED RELEASE ORAL DAILY
Qty: 60 CAPSULE | Refills: 3 | Status: SHIPPED | OUTPATIENT
Start: 2025-05-09

## 2025-05-09 NOTE — PROGRESS NOTES
Outpatient Psychiatry Followup Visit (with Ambient Listening)  5/9/2025  Assessment & Plan    Impression     ICD-10-CM ICD-9-CM   1. Recurrent major depressive disorder, in full remission  F33.42 296.36   2. Grief  F43.21 309.0   3. Anxiety disorder, unspecified type  F41.9 300.00   4. Insomnia, unspecified type  G47.00 780.52   5. BMI 27.0-27.9,adult  Z68.27 V85.23      Plan of Care & Medication Management    Chart was reviewed. The risks and benefits of medication were discussed with pt. The treatment plan and followup plan were reviewed with pt. Pt understands to contact clinic if symptoms worsen. Pt understands to call 911 or go to nearest ER for suicidal ideation, intent or plan. Unless otherwise specified, pt did NOT display signs of nor endorse symptoms of overt psychosis or acute mood disorder requiring hospitalization during the encounter; pt denied violent thoughts or suicidal or homicidal ideation, intent, or plan. Portions of this note was generated with the assistance of ambient listening technology. Verbal consent was obtained by the patient and accompanying visitor(s) for the recording of patient appointment to facilitate this note. I attest to having reviewed and edited the generated note for accuracy, though some syntax or spelling errors may persist. Please contact the author of this note for any clarification. Unless otherwise specified, pt did NOT display signs of nor endorse symptoms of overt psychosis or acute mood disorder requiring hospitalization during the encounter; pt denied violent thoughts or suicidal or homicidal ideation, intent, or plan.   Ambient Data Assessment & Plan    IMPRESSION:  Conducted [place] Cognitive Assessment (MoCA) version 8.1 to evaluate cognitive function.  MoCA score 23/30, compared to previous score of 25 in 2022.  Score difference not clinically significant; cognitive function appears stable.  Calculated Memory Index Score of 11, indicating 53% chance of  progression from MCI to Alzheimer's if MCI present.  High false positive rate for MCI at current score level.  Decided against diagnosing MCI or dementia at this time.  Plan to reassess cognitive function in 3 months for more accurate evaluation.  Continued current medication regimen without changes, considering upcoming period of reduced social support.  Considering deprescribing medications later this year, if able.    COUNSELING/MONITORING:  Patient to continue attending card making class.  Patient to maintain social interactions and invite people over while family members are away.  Patient to resume regular gym attendance.  Recommend engaging in alternative activities when feeling pulled towards bed, such as using the computer or tending to goats.    MEDICATIONS - SEE BOX BELOW:  Continued medications: Abilify 5 mg, Cymbalta 40 mg, Klonopin 0.5 or 1 mg at night, Lexapro 10 mg, Wellbutrin 300 mg.    FOLLOW UP:  Follow up on July 25th at 1:30 PM as scheduled, will also repeat MOCA - will do version 8.2  Contact the office if needed before next scheduled appointment.        RX History ABILIFY, AMBIEN (somnambulism), ATARAX/VISTARIL, CYMBALTA, DEXEDRINE, KLONOPIN (since 2005), LEXAPRO, TRAZODONE,   WELLBUTRIN XL   Current RX 5/9/2025: Plan to deprescribe in Summer or Fall 2025 5/9/2025: MOCA 8.1 TS 23/30, MIS 11/15  Continue ABILIFY  Metabolic monitoring:  SEP2024 Lipids acceptable  AUG2023 Lipids acceptable  JAN2022 A1c 5.2  Adjustments:  16MAY2024: Increase to 5mg daily  Prior to 5/16/2024, pt had been taking 2mg daily  Continue CYMBALTA  Adjustments:  11/14/2024: Reduce to 40mg daily  Prior to 11/14/2024 pt reports had been taking 60mg daily for at least a year  Continue KLONOPIN  Adjustments:  16MAY2024: Continue 0.5-1mg HS  Prior to 5/16/2024, pt had been taking 0.5-1mg HS since 2005  Continue LEXAPRO  Adjustments:  17JUN2024: Start 10mg daily   Continue WELLBUTRIN XL  Adjustments:  16MAY2024: Continue 300mg  "qam  Prior to 5/16/2024, pt had been taking 300mg qam      RETURN U: ALTERNATE PROTOCOL: RETURN IN 12 WEEKS (THREE MONTHS)       Subjective    Available documentation has been reviewed, and pertinent elements of the chart have been incorporated into this note where appropriate. Last Epic encounter with writer was on 3/12/2025   Madisyn Velasquez, a 79 y.o. female, presenting for followup visit. This visit was done in person, IN CLINIC.    Ambient Data     History of Present Illness    HPI:  Patient presents for a follow-up visit for psychotropic medication management and to complete a memory test.     Patient reports taking longer naps in the afternoon, lasting about two hours, which started approximately a month ago. She describes the bed as "like a magnet" that pulls her in, making it difficult to resist. This behavior is causing her distress, as she states, "I'm upset with myself."    The increase in napping, which occurred about a month ago, coincided with the anniversary of her 's death, which is about a year ago. This event may be contributing to her current symptoms.    Despite the increased napping, the patient has been maintaining some activities. She reports going to the gym regularly, attending a card-making class, and socializing with friends and family. She expresses pride in her efforts to stay active, stating, "I've been three days this week" regarding gym attendance.    Patient describes a stressful incident from the previous night involving an unexpected visit from an ex-employee's girlfriend and her baby. The situation was complicated by suspected drug use by the ex-employee and resulted in the patient taking two Clonazepam to manage her anxiety.    MOCA 8.1 was conducted during the visit. Patient scored 23 out of 30, which is a slight decrease from her previous total score of 25 in 2022. The patient's memory appears stable. Pt agrees to repeating the test next visit in 3 months.    While the " "patient denies significant depression, she expresses frustration with her increased napping behavior. She states, "Not too depressed, just it's depressing that I keep getting back in bed."    Patient shows enthusiasm when discussing her current interests, including caring for baby goats and tending to day lilies. She also mentions planning social activities and having company for dinner, indicating efforts to stay engaged and active.            Objective    Vitals:    05/09/25 1313   BP: 128/78   Weight: 68.3 kg (150 lb 11 oz)   Height: 5' 2" (1.575 m)     (Current body mass index is 27.56 kg/m².)    MSE/PE  1. Appearance: Dress is informal but appropriate. Motor activity normal.  2. Discourse: Clear speech with normal rate and volume. Associations intact. Orderly.  3. Emotional Expression: Somewhat anxious and depressed mood.  4. Perception and Thinking: No hallucinations. No suicidality, no homicidality, delusions, or paranoia.  5. Sensorium: Grossly intact. Able to focus for interview.  6. Memory and Fund of Knowledge: Intact for content of interview.  7. Insight and Judgment: Intact.       Measurement-Based Care (MBC):     Routine Instruments   PROMIS-ANXIETY Interpretation: 4 (4a raw score): T-SCORE 40.3; NONE TO SLIGHT using 55-60-70 cutoffs.   GDS4 Interpretation: 1/4; UNCERTAIN for depression    PSS4 Interpretation: 902: Stress appraisal is MODERATE. High lack of self-efficacy; pt strongly perceives an inability to handle problems.   Additional Instruments   MBC ANCHOR : a little worse  [place] Cognitive Assessment (MoCA)  Version 8.1 English (lion, cube)  5/9/2025   Section Scores   Visuospatial/Executive 3/5    Naming 3/3    Attention 5/6    Language 2/3    Abstraction 2/2    Delayed Recall 3/5    Orientation 5/6    Composite Scores   Raw Total Score (MOCA-TS) 23/30   Memory Index Score (MOCA-MIS) 11/15    Interpretation    MOCA-TS is 23/30. This score (23-25) has a HIGH FALSE POSITIVE RATE FOR MILD " "COGNITIVE IMPAIRMENT (6-10-18-23-26 cutoffs), and research has shown for many cases that 23 is a more reliable cutoff than the publisher's 25. The cut-off score of 18 is usually considered to separate MCI from AD but there is overlap in the scores since, by definition, AD is determined by the presence of cognitive impairment in addition to loss of autonomy. The average MoCA score for MCI is 22 (range 19-25) and the average MoCA score for Mild AD is 16 (11-21). Memory Index Score (MOCA-MIS) is a predictor of conversion from MILD COGNITIVE IMPAIRMENT to MODERATE COGNITIVE IMPAIRMENT. The patient's MOCA-TS is 20 or more (23/30) AND MOCA-MIS is 7 or more (11/15). There is a 53% chance patient will convert from MCI (mild cognitive impairment) to AD (Alzheimer's Disease), mild dementia or moderate cognitive impairment. Plan to repeat this test (different version) in 3 months.   [id number]                 Billing Documentation:     Method IN PERSON visit at the clinic, established   E/M 78291: FOLLOW UP VISIT, Rx mgmt, "Multiple STABLE chronic illnesses"   Additional 60235, with modifer 59: administered and scored more than one psychological or neuropsychological tests (see MBC above) (16+ mins)  , without modifiers -24,-25,-53: COMPLEXITY: Visit today included increased complexity associated with the care of the episodic problem(s) (multiple psychiatric disorders - see above) addressed and managing the longitudinal care of the patient due to the serious and/or complex managed problem(s) (multiple psychiatric disorders - see above).                Massimo Jacobo DO  Department of Psychiatry, [facility]        "

## 2025-05-22 DIAGNOSIS — I48.11 LONGSTANDING PERSISTENT ATRIAL FIBRILLATION: ICD-10-CM

## 2025-05-22 DIAGNOSIS — E78.2 MIXED HYPERLIPIDEMIA: ICD-10-CM

## 2025-05-22 DIAGNOSIS — Z79.01 HX OF LONG TERM USE OF BLOOD THINNERS: ICD-10-CM

## 2025-05-22 NOTE — TELEPHONE ENCOUNTER
----- Message from Luciano sent at 5/22/2025  1:26 PM CDT -----  Regarding: refill  Contact: patient  Type:  RX Refill RequestWho Called: patientRefill or New Rx:refillRX Name and Strength:ELIQUIS 5 mg Tab      and     rosuvastatin (CRESTOR) 10 MG tablet     and     sotaloL (BETAPACE) 80 MG tablet  How is the patient currently taking it? (ex. 1XDay):Is this a 30 day or 90 day RX:90Preferred Pharm[facility] with phone [id number] [place] [place] - [address] AT [facility] [id number] [facility] [address]Hnuzl: [phone] Fax: [phone] or Mail Order:localOrdering Provider:Mihir the patient rather a call back or a response via [facility]? New order/ New [place]aritza Call Back Number:[phone] Information:

## 2025-05-23 RX ORDER — APIXABAN 5 MG/1
5 TABLET, FILM COATED ORAL 2 TIMES DAILY
Qty: 180 TABLET | Refills: 1 | Status: SHIPPED | OUTPATIENT
Start: 2025-05-23 | End: 2025-11-19

## 2025-05-23 RX ORDER — ROSUVASTATIN CALCIUM 10 MG/1
10 TABLET, COATED ORAL DAILY
Qty: 90 TABLET | Refills: 1 | Status: SHIPPED | OUTPATIENT
Start: 2025-05-23 | End: 2025-11-19

## 2025-05-23 RX ORDER — SOTALOL HYDROCHLORIDE 80 MG/1
40 TABLET ORAL 2 TIMES DAILY
Qty: 60 TABLET | Refills: 0 | Status: SHIPPED | OUTPATIENT
Start: 2025-05-23 | End: 2025-07-22

## 2025-06-04 RX ORDER — METRONIDAZOLE 7.5 MG/G
CREAM TOPICAL
Qty: 45 G | Refills: 3 | Status: SHIPPED | OUTPATIENT
Start: 2025-06-04

## 2025-07-24 ENCOUNTER — OFFICE VISIT (OUTPATIENT)
Dept: PSYCHIATRY | Facility: CLINIC | Age: 80
End: 2025-07-24
Payer: MEDICARE

## 2025-07-24 VITALS
WEIGHT: 144.63 LBS | DIASTOLIC BLOOD PRESSURE: 86 MMHG | BODY MASS INDEX: 26.61 KG/M2 | SYSTOLIC BLOOD PRESSURE: 130 MMHG | HEART RATE: 83 BPM | HEIGHT: 62 IN

## 2025-07-24 DIAGNOSIS — F33.42 RECURRENT MAJOR DEPRESSIVE DISORDER, IN FULL REMISSION: Primary | ICD-10-CM

## 2025-07-24 DIAGNOSIS — E53.8 VITAMIN B12 DEFICIENCY: ICD-10-CM

## 2025-07-24 DIAGNOSIS — F41.9 ANXIETY DISORDER, UNSPECIFIED TYPE: ICD-10-CM

## 2025-07-24 DIAGNOSIS — R53.83 FATIGUE, UNSPECIFIED TYPE: ICD-10-CM

## 2025-07-24 DIAGNOSIS — G47.00 INSOMNIA, UNSPECIFIED TYPE: ICD-10-CM

## 2025-07-24 PROCEDURE — 1160F RVW MEDS BY RX/DR IN RCRD: CPT | Mod: CPTII,HCNC,S$GLB, | Performed by: STUDENT IN AN ORGANIZED HEALTH CARE EDUCATION/TRAINING PROGRAM

## 2025-07-24 PROCEDURE — 1101F PT FALLS ASSESS-DOCD LE1/YR: CPT | Mod: CPTII,HCNC,S$GLB, | Performed by: STUDENT IN AN ORGANIZED HEALTH CARE EDUCATION/TRAINING PROGRAM

## 2025-07-24 PROCEDURE — 1157F ADVNC CARE PLAN IN RCRD: CPT | Mod: CPTII,HCNC,S$GLB, | Performed by: STUDENT IN AN ORGANIZED HEALTH CARE EDUCATION/TRAINING PROGRAM

## 2025-07-24 PROCEDURE — 99215 OFFICE O/P EST HI 40 MIN: CPT | Mod: HCNC,S$GLB,, | Performed by: STUDENT IN AN ORGANIZED HEALTH CARE EDUCATION/TRAINING PROGRAM

## 2025-07-24 PROCEDURE — 1159F MED LIST DOCD IN RCRD: CPT | Mod: CPTII,HCNC,S$GLB, | Performed by: STUDENT IN AN ORGANIZED HEALTH CARE EDUCATION/TRAINING PROGRAM

## 2025-07-24 PROCEDURE — 3079F DIAST BP 80-89 MM HG: CPT | Mod: CPTII,HCNC,S$GLB, | Performed by: STUDENT IN AN ORGANIZED HEALTH CARE EDUCATION/TRAINING PROGRAM

## 2025-07-24 PROCEDURE — 96136 PSYCL/NRPSYC TST PHY/QHP 1ST: CPT | Mod: 59,HCNC,S$GLB, | Performed by: STUDENT IN AN ORGANIZED HEALTH CARE EDUCATION/TRAINING PROGRAM

## 2025-07-24 PROCEDURE — 3288F FALL RISK ASSESSMENT DOCD: CPT | Mod: CPTII,HCNC,S$GLB, | Performed by: STUDENT IN AN ORGANIZED HEALTH CARE EDUCATION/TRAINING PROGRAM

## 2025-07-24 PROCEDURE — 3075F SYST BP GE 130 - 139MM HG: CPT | Mod: CPTII,HCNC,S$GLB, | Performed by: STUDENT IN AN ORGANIZED HEALTH CARE EDUCATION/TRAINING PROGRAM

## 2025-07-24 PROCEDURE — G2211 COMPLEX E/M VISIT ADD ON: HCPCS | Mod: HCNC,S$GLB,, | Performed by: STUDENT IN AN ORGANIZED HEALTH CARE EDUCATION/TRAINING PROGRAM

## 2025-07-24 PROCEDURE — 1126F AMNT PAIN NOTED NONE PRSNT: CPT | Mod: CPTII,HCNC,S$GLB, | Performed by: STUDENT IN AN ORGANIZED HEALTH CARE EDUCATION/TRAINING PROGRAM

## 2025-07-24 PROCEDURE — 99999 PR PBB SHADOW E&M-EST. PATIENT-LVL IV: CPT | Mod: PBBFAC,HCNC,, | Performed by: STUDENT IN AN ORGANIZED HEALTH CARE EDUCATION/TRAINING PROGRAM

## 2025-07-24 RX ORDER — ESCITALOPRAM OXALATE 10 MG/1
10 TABLET ORAL EVERY MORNING
Qty: 30 TABLET | Refills: 3 | Status: SHIPPED | OUTPATIENT
Start: 2025-07-24

## 2025-07-24 RX ORDER — ARIPIPRAZOLE 2 MG/1
2 TABLET ORAL DAILY
Qty: 30 TABLET | Refills: 1 | Status: SHIPPED | OUTPATIENT
Start: 2025-07-24

## 2025-07-24 RX ORDER — BUPROPION HYDROCHLORIDE 300 MG/1
300 TABLET ORAL DAILY
Qty: 30 TABLET | Refills: 3 | Status: SHIPPED | OUTPATIENT
Start: 2025-07-24

## 2025-07-24 RX ORDER — DULOXETIN HYDROCHLORIDE 20 MG/1
40 CAPSULE, DELAYED RELEASE ORAL DAILY
Qty: 60 CAPSULE | Refills: 3 | Status: SHIPPED | OUTPATIENT
Start: 2025-07-24

## 2025-07-24 RX ORDER — CLONAZEPAM 0.5 MG/1
.5-1 TABLET, ORALLY DISINTEGRATING ORAL NIGHTLY PRN
Qty: 40 TABLET | Refills: 3 | Status: SHIPPED | OUTPATIENT
Start: 2025-07-24

## 2025-07-24 RX ORDER — MIRABEGRON 25 MG/1
25 TABLET, FILM COATED, EXTENDED RELEASE ORAL
COMMUNITY
Start: 2025-07-23

## 2025-07-24 NOTE — PROGRESS NOTES
Outpatient Psychiatry Followup Visit - IN PERSON  7/24/2025  Assessment & Plan    Impression     ICD-10-CM ICD-9-CM   1. Recurrent major depressive disorder, in full remission  F33.42 296.36   2. Anxiety disorder, unspecified type  F41.9 300.00   3. Insomnia, unspecified type  G47.00 780.52   4. Fatigue, unspecified type  R53.83 780.79   5. Vitamin B12 deficiency  E53.8 266.2   6. BMI 26.0-26.9,adult  Z68.26 V85.22      Plan of Care & Medication Management    Chart was reviewed. The risks and benefits of medication were discussed with pt. The treatment plan and followup plan were reviewed with pt. Pt understands to contact clinic if symptoms worsen. Pt understands to call 911 or go to nearest ER for suicidal ideation, intent or plan. Unless otherwise specified, pt did NOT display signs of nor endorse symptoms of overt psychosis or acute mood disorder requiring hospitalization during the encounter; pt denied violent thoughts or suicidal or homicidal ideation, intent, or plan.   RX History ABILIFY, AMBIEN (somnambulism), ATARAX/VISTARIL, CYMBALTA, DEXEDRINE, KLONOPIN (since 2005), LEXAPRO, TRAZODONE,   WELLBUTRIN XL   Current RX 5/9/2025: Plan to deprescribe in Summer or Fall 2025 5/9/2025: MOCA 8.1 TS 23/30, MIS 11/15  7/24/2025: MOCA 8.2 TS 23/30, 6/15  Reduce ABILIFY  Metabolic monitoring:  SEP2024 Lipids acceptable  AUG2023 Lipids acceptable  JAN2022 A1c 5.2  Adjustments:  7/24/2025: Reduce to 2mg daily  16MAY2024: Increase to 5mg daily  Prior to 5/16/2024, pt had been taking 2mg daily  Continue CYMBALTA  Adjustments:  11/14/2024: Reduce to 40mg daily  Prior to 11/14/2024 pt reports had been taking 60mg daily for at least a year  Continue KLONOPIN  Adjustments:  16MAY2024: Continue 0.5-1mg HS  Prior to 5/16/2024, pt had been taking 0.5-1mg HS since 2005  Continue LEXAPRO  Adjustments:  17JUN2024: Start 10mg daily   Continue WELLBUTRIN XL  Adjustments:  16MAY2024: Continue 300mg qam  Prior to 5/16/2024, pt had  "been taking 300mg qam  5/9/2025: Plan to deprescribe in Summer or Fall 2025      Other [x]CONTRACT 7/24/2025?  [x] REVIEWED 7/24/2025?  []VITAMIN B12 cobalamin (Relevant risk factor(s) for abnormal value: a previous history of deficiency or insufficiency, depression, low energy, and memory deficits)   RETURN K: STANDARD PROTOCOL: RETURN IN 4 WEEKS (ONE MONTH)       Subjective    Available documentation has been reviewed, and pertinent elements of the chart have been incorporated into this note where appropriate. Last Epic encounter with writer was on 5/9/2025   Madisyn Velasquez, a 79 y.o. female, presenting for followup visit. This visit was done in person, IN CLINIC.      Sleeping more than she'd like to  "Getting out of bed is a Herculean struggle"  Going to gym  Denies depressed mood    NO falls    Happy about some intended weight loss    MOCA today  About the same    Discussed reducing ABILIFY to improve energy, will monitor mood  Adjust visit frequency to q1m  Continue treatment otherwise as planned        Objective    Vitals:    07/24/25 1310 07/24/25 1314   BP: (!) 154/86 130/86   Pulse: 83 83   Weight: 65.6 kg (144 lb 10 oz)    Height: 5' 2" (1.575 m)      (Current body mass index is 26.45 kg/m².)    MSE/PE  1. Appearance: Dress is informal but appropriate. Motor activity normal.  2. Discourse: Clear speech with normal rate and volume. Associations intact. Orderly.  3. Emotional Expression: Euthymic mood.  4. Perception and Thinking: No hallucinations. No suicidality, no homicidality, delusions, or paranoia.  5. Sensorium: Grossly intact. Able to focus for interview.  6. Memory and Fund of Knowledge: Intact for content of interview.  7. Insight and Judgment: Intact.       Measurement-Based Care (MBC):     Routine Instruments   PROMIS-ANXIETY Interpretation: 5 (4a raw score): T-SCORE 48; NONE TO SLIGHT using 55-60-70 cutoffs.   GDS4 Interpretation: 1/4; UNCERTAIN for depression    WHO-5: 9 raw: 36% "   Additional Instruments     [place] Cognitive Assessment (MoCA)  Version 8.2 English (snake, chair)  7/24/2025   Section Scores   Visuospatial/Executive 3/5    Naming 3/3    Attention 5/6    Language 3/3    Abstraction 2/2    Delayed Recall 1/5    Orientation 6/6    Composite Scores   Raw Total Score (MOCA-TS) 23/30   Memory Index Score (MOCA-MIS) 6/15    Interpretation    MOCA-TS is 23/30. Plan to repeat this test (different version) in 3 months.   [id number]           Auto-populated chart data omitted from this note for brevity.      Billing Documentation:     Method IN PERSON visit at the clinic, established   E/M 61225: FOLLOW UP VISIT, 45 minutes   Additional 44598, with modifer 59: administered and scored more than one psychological or neuropsychological tests (see MBC above) (16+ mins)  , without modifiers -24,-25,-53: COMPLEXITY: Visit today included increased complexity associated with the care of the episodic problem(s) (multiple psychiatric disorders - see above) addressed and managing the longitudinal care of the patient due to the serious and/or complex managed problem(s) (multiple psychiatric disorders - see above).        Total Time: I spent a total of 61 minutes on the day of the visit. This includes face to face time and non-face to face time preparing to see the patient (eg, review of tests), obtaining and/or reviewing separately obtained history, documenting clinical information in the electronic or other health record, independently interpreting results and communicating results to the patient/family/caregiver, or care coordinator.      Massimo Jacobo DO  Department of Psychiatry, [facility]

## 2025-07-24 NOTE — PATIENT INSTRUCTIONS
Please complete lab work at your earliest convenience. Your labs are NON-FASTING.    Limit NAP DURATION to 30 MINUTES (or less).    Followup with cardiology    Reduce ABILIFY from 5mg daily to 2mg daily    Continue other medicine as prescribed

## 2025-07-28 DIAGNOSIS — I48.11 LONGSTANDING PERSISTENT ATRIAL FIBRILLATION: ICD-10-CM

## 2025-07-28 NOTE — TELEPHONE ENCOUNTER
Copied from CRM #[id number]. Topic: Medications - Medication Refill  >> Jul 28, 2025  2:01 PM Eh wrote:  .Type:  RX Refill Request    Who Called: pt  Refill or New Rx:refill  RX Name and Strength:  sotaloL (BETAPACE) 80 MG tablet 60 tablet 0 5/23/2025 7/22/2025    Pharmacy:  [id number] [place], [place] - [address] AT [facility] & [place] [address]  [address] [address]  Phone: [phone] Fax: [phone]     Local or Mail Order:local  Ordering Provider:mary carmen  Would the patient rather a call back or a response via [facility]? Call back  Best Call Back Number:[phone]   Additional Information: pt st she is out of meds & needs dr to refill. Pt st once med is submitted. Please give her a call. Please call to discuss.

## 2025-07-30 RX ORDER — SOTALOL HYDROCHLORIDE 80 MG/1
40 TABLET ORAL 2 TIMES DAILY
Qty: 180 TABLET | Refills: 3 | Status: SHIPPED | OUTPATIENT
Start: 2025-07-30 | End: 2027-07-20

## 2025-08-05 ENCOUNTER — OFFICE VISIT (OUTPATIENT)
Dept: PRIMARY CARE CLINIC | Facility: CLINIC | Age: 80
End: 2025-08-05
Payer: MEDICARE

## 2025-08-05 VITALS
OXYGEN SATURATION: 97 % | BODY MASS INDEX: 27.1 KG/M2 | SYSTOLIC BLOOD PRESSURE: 138 MMHG | DIASTOLIC BLOOD PRESSURE: 64 MMHG | HEART RATE: 85 BPM | HEIGHT: 62 IN | TEMPERATURE: 98 F | WEIGHT: 147.25 LBS

## 2025-08-05 DIAGNOSIS — E78.2 MIXED HYPERLIPIDEMIA: ICD-10-CM

## 2025-08-05 DIAGNOSIS — R41.3 MEMORY LOSS: ICD-10-CM

## 2025-08-05 DIAGNOSIS — K21.9 GASTROESOPHAGEAL REFLUX DISEASE WITHOUT ESOPHAGITIS: ICD-10-CM

## 2025-08-05 DIAGNOSIS — K59.00 CONSTIPATION, UNSPECIFIED CONSTIPATION TYPE: ICD-10-CM

## 2025-08-05 DIAGNOSIS — G47.00 INSOMNIA, UNSPECIFIED TYPE: ICD-10-CM

## 2025-08-05 DIAGNOSIS — F51.01 PRIMARY INSOMNIA: ICD-10-CM

## 2025-08-05 DIAGNOSIS — F33.1 MODERATE EPISODE OF RECURRENT MAJOR DEPRESSIVE DISORDER: Primary | ICD-10-CM

## 2025-08-05 DIAGNOSIS — R39.15 URINARY URGENCY: Primary | ICD-10-CM

## 2025-08-05 PROCEDURE — 3288F FALL RISK ASSESSMENT DOCD: CPT | Mod: CPTII,HCNC,S$GLB, | Performed by: INTERNAL MEDICINE

## 2025-08-05 PROCEDURE — 1123F ACP DISCUSS/DSCN MKR DOCD: CPT | Mod: CPTII,HCNC,S$GLB, | Performed by: INTERNAL MEDICINE

## 2025-08-05 PROCEDURE — 1160F RVW MEDS BY RX/DR IN RCRD: CPT | Mod: CPTII,HCNC,S$GLB, | Performed by: INTERNAL MEDICINE

## 2025-08-05 PROCEDURE — 1101F PT FALLS ASSESS-DOCD LE1/YR: CPT | Mod: CPTII,HCNC,S$GLB, | Performed by: INTERNAL MEDICINE

## 2025-08-05 PROCEDURE — 99215 OFFICE O/P EST HI 40 MIN: CPT | Mod: HCNC,S$GLB,, | Performed by: INTERNAL MEDICINE

## 2025-08-05 PROCEDURE — 3078F DIAST BP <80 MM HG: CPT | Mod: CPTII,HCNC,S$GLB, | Performed by: INTERNAL MEDICINE

## 2025-08-05 PROCEDURE — 99999 PR PBB SHADOW E&M-EST. PATIENT-LVL IV: CPT | Mod: PBBFAC,HCNC,, | Performed by: INTERNAL MEDICINE

## 2025-08-05 PROCEDURE — 1159F MED LIST DOCD IN RCRD: CPT | Mod: CPTII,HCNC,S$GLB, | Performed by: INTERNAL MEDICINE

## 2025-08-05 PROCEDURE — 3075F SYST BP GE 130 - 139MM HG: CPT | Mod: CPTII,HCNC,S$GLB, | Performed by: INTERNAL MEDICINE

## 2025-08-05 PROCEDURE — 1126F AMNT PAIN NOTED NONE PRSNT: CPT | Mod: CPTII,HCNC,S$GLB, | Performed by: INTERNAL MEDICINE

## 2025-08-05 RX ORDER — MIRABEGRON 50 MG/1
1 TABLET, FILM COATED, EXTENDED RELEASE ORAL
Qty: 90 TABLET | Refills: 3 | Status: SHIPPED | OUTPATIENT
Start: 2025-08-05

## 2025-08-05 NOTE — PROGRESS NOTES
"INTERNAL MEDICINE PROGRESS/URGENT CARE NOTE    CHIEF COMPLAINT     Chief Complaint   Patient presents with    Follow-up     Patient presents for her 3 month follow up appointment.    Depression     Noticing she is more fatigued lately and has been neglecting her personal hygiene.       HPI   Madisyn Velasquez is a 79 y.o.female who presents with a PMHx of  Afib, HTN, HLD,  Depression/anxiety, GERD, fall with post traumatic SAH who presents today for her routine follow up  visit.        Her only medical concern today:   5-pound weight loss. Intentionally. Says "I'm watching what I eat"   Still having trouble witjh bowel movements. Note she has IBS-C and on linzess but doesn't think its working. Shei s already on the highest dose of linzess so nothing to do there.e   Says she doesn't feel depressed but is very unmotivated to get out of bed. She will get    At her most recent appointment, we discussed:   Worsening depression. Has an appointment with her psychiatrist in 2 days. Today, she reports mood is the same   Chronic b/l hip pain. Has completed PT, last done 6 weeks ago. Had an injection from ortho but it didn't help.            At her previous visits, we had addressed:   Had had two falls. She has been excercising 3 days a week.  On ROS, says constipation. Has tried metamucil and miralax. Iquired about linzess. Discussed side effects and agreed on lactulose. Says It worked for her when her  was on hospice had it and she used it and it worked for her.   worsening depression. Of note, her  who she served as caregiver has recently . she reported that she has been doing "ok". Also dealing with family issues with finances. [facility]   Memory loss. Was referred to neurology.      HTN: denies history of HTN. Note chart has multiple elevated readings. She was prescribed norvasc but has not been taking it.   BP today is wnl 136/88     Depression: last completed ++PHQ9 " 11/27, was 18 on initial visit. Loss of interest in doing most things she previously enjoyed such as going to water aerobics class, insomnia, +change in appetite. Previously took wellbutrin and lexapro but says she has been out of the wellbutrin for months on accident. Recently got a refill of her medication two days and have restarted it so she is waiting for it to take effect.      Repeated falls with recent SAH: two falls were due to tripping over her husbands oxygen tube     Polypharmacy: long discussion re medication use. Advised against the use of celebrex and eliquis and she agreed to stop the celebrex because it doesn't seem to help her anyways. Advised against the  Chronic long term use of clonipin due to her fall history and being on a NOAC. She will try to wean down off this. She uses it for insomnia, and agrees to trial of trazodone instead while we wean her slowly of the clonazepam. Also advised good sleep hygiene.        chronic forgetfulness. Noted with issues with medication.   Has a lot on her plate and constantly multitasking.   Her  thinks her hearing is not good but she thinks its selective hearing loss  TSH and vitamin B12 labs were ordered at her last visit but not done.   Specifically describes that when she goies into a room ofetn forget when she went in there for. Takes a fruit for her  but then forgets where she put it. Now having issues with major ADLs and iADLs such as bill paying or driving but with minor daily functions.   She does do a pill box. Etc      Insomnia: patient now takes clopinin (was on this prior to my meeting her ut was on 1mg now on only 1/2 taht dose) which is great that weve been able to wean. But she was unsuccessful at weaning lower. Also took the trazodone and christie th makes her sleep ebtter. We tried with one each and she could not fall asleep or stay asleep.          Home Medications:  Prior to Admission medications    Medication Sig Start Date End Date  Taking? Authorizing Provider   ARIPiprazole (ABILIFY) 2 MG Tab Take 1 tablet (2 mg total) by mouth once daily. 7/24/25   Massimo Jacobo,    buPROPion (WELLBUTRIN XL) 300 MG 24 hr tablet Take 1 tablet (300 mg total) by mouth once daily. 7/24/25   Massimo Jacobo,    clonazePAM (KLONOPIN) 0.5 MG disintegrating tablet Take 1-2 tablets (0.5-1 mg total) by mouth nightly as needed (insomnia). 7/24/25   Massimo Jacobo,    DULoxetine (CYMBALTA) 20 MG capsule Take 2 capsules (40 mg total) by mouth once daily. 7/24/25   Massimo Jacobo,    ELIQUIS 5 mg Tab Take 1 tablet (5 mg total) by mouth 2 (two) times daily. 5/23/25 11/19/25  Ramila Diop MD   EScitalopram oxalate (LEXAPRO) 10 MG tablet Take 1 tablet (10 mg total) by mouth every morning. 7/24/25   Massimo Jacobo,    LINZESS 290 mcg Cap capsule Take 290 mcg by mouth. 11/29/24   Provider, Historical   metronidazole 0.75% (METROCREAM) 0.75 % Crea APPLY TOPICALLY TO THE AFFECTED AREA EVERY NIGHT AS NEEDED 6/4/25   Ela Mays MD   MYRBETRIQ 25 mg Tb24 ER tablet Take 25 mg by mouth. 7/23/25   Provider, Historical   MYRBETRIQ 50 mg Tb24 Take 1 tablet by mouth. 2/9/25   Provider, Historical   rosuvastatin (CRESTOR) 10 MG tablet Take 1 tablet (10 mg total) by mouth once daily. 5/23/25 11/19/25  Ramila Diop MD   sotaloL (BETAPACE) 80 MG tablet Take 0.5 tablets (40 mg total) by mouth 2 (two) times daily. 7/30/25 7/20/27  Ramila Diop MD       Review of Systems:  Review of Systems      Advance Care Planning     Date: 08/05/2025    Power of   I initiated the process of voluntary advance care planning today and explained the importance of this process to the patient.  I introduced the concept of advance directives to the patient, as well. Then the patient received detailed information about the importance of designating a Health Care Power of  (HCPOA). She was also instructed to communicate with  "this person about their wishes for future healthcare, should she become sick and lose decision-making capacity. The patient has previously appointed a HCPOA. After our discussion, the patient has decided to complete a HCPOA and has appointed her family, health care agent: family & health care agent number: on file. I encouraged her to communicate with this person about their wishes for future healthcare, should she become sickon file and lose decision-making capacity.      A total of 10 min was spent on advance care planning, goals of care discussion, emotional support, formulating and communicating prognosis and exploring burden/benefit of various approaches of treatment. This discussion occurred on a fully voluntary basis with the verbal consent of the patient and/or family.             PHYSICAL EXAM     /64 (BP Location: Left arm, Patient Position: Sitting)   Pulse 85   Temp 98 °F (36.7 °C)   Ht 5' 2" (1.575 m)   Wt 66.8 kg (147 lb 4.3 oz)   SpO2 97%   BMI 26.94 kg/m²     GEN - A+OX4, NAD   HEENT - PERRL, EOMI, OP clear  Neck - No thyromegaly or cervical LAD. No thyroid masses felt.  CV - RRR, no m/r   Chest - CTAB, no wheezing or rhonchi  Abd - S/NT/ND/+BS.   Ext - 2+BDP and radial pulses. No C/C/E.  Skin - No rash.    LABS         ASSESSMENT/PLAN     Madisyn Velasquez is a 79 y.o. female with  1. Moderate episode of recurrent major depressive disorder  Overview:  Continue with current medications. Says her psychiatrist is considering ritalin due to her apathy  Denies SI/HI  Encouraged to continue with socializing etc       2. Insomnia, unspecified type  Overview:  Doing very well with the clonipin. Prescribed by psychiatry       3. Primary insomnia  Overview:  Doing very well with the clonipin. Prescribed by psychiatry   Discussed sleep hygiene and being more active      4. Gastroesophageal reflux disease without esophagitis  Overview:  Well controlled       5. Constipation, unspecified constipation " type  Overview:  Initially improved with linzess.still with constipation. She will follow up with GI to discuss possible colonscnopy  Will try a suppository and also will try lactulose that she has at home from when her  was ill   Advised fiber, fluid and movement which she is pretty good at.       6. Mixed hyperlipidemia  On statin therapy. Continue  Advised a low cholesterol diet and continuing with her water aerobics classes    7. Memory loss  Stable   Possibly mood related           WORRY SCORE     RTC in 3 months, sooner if needed and depending on labs.    Ela Mays MD  Board Certified Internist/Geriatrician  [facility] ([place])

## 2025-08-08 DIAGNOSIS — R14.0 ABDOMINAL BLOATING: ICD-10-CM

## 2025-08-08 DIAGNOSIS — K59.09 CHRONIC CONSTIPATION: Primary | ICD-10-CM

## 2025-08-11 ENCOUNTER — HOSPITAL ENCOUNTER (OUTPATIENT)
Dept: RADIOLOGY | Facility: HOSPITAL | Age: 80
Discharge: HOME | End: 2025-08-11
Attending: NURSE PRACTITIONER
Payer: MEDICARE

## 2025-08-11 DIAGNOSIS — K59.09 CHRONIC CONSTIPATION: ICD-10-CM

## 2025-08-11 DIAGNOSIS — R14.0 ABDOMINAL BLOATING: ICD-10-CM

## 2025-08-11 PROCEDURE — 74018 RADEX ABDOMEN 1 VIEW: CPT | Mod: 26,,, | Performed by: RADIOLOGY

## 2025-08-11 PROCEDURE — 74018 RADEX ABDOMEN 1 VIEW: CPT | Mod: TC,PO

## 2025-08-25 ENCOUNTER — OFFICE VISIT (OUTPATIENT)
Dept: PSYCHIATRY | Facility: CLINIC | Age: 80
End: 2025-08-25
Payer: MEDICARE

## 2025-08-25 VITALS
SYSTOLIC BLOOD PRESSURE: 130 MMHG | HEART RATE: 86 BPM | WEIGHT: 142.94 LBS | BODY MASS INDEX: 26.15 KG/M2 | DIASTOLIC BLOOD PRESSURE: 73 MMHG

## 2025-08-25 DIAGNOSIS — F33.42 RECURRENT MAJOR DEPRESSIVE DISORDER, IN FULL REMISSION: Primary | ICD-10-CM

## 2025-08-25 DIAGNOSIS — F41.9 ANXIETY DISORDER, UNSPECIFIED TYPE: ICD-10-CM

## 2025-08-25 DIAGNOSIS — G31.84 MILD COGNITIVE IMPAIRMENT: ICD-10-CM

## 2025-08-25 DIAGNOSIS — R53.83 FATIGUE, UNSPECIFIED TYPE: ICD-10-CM

## 2025-08-25 DIAGNOSIS — Z65.8 INTERPERSONAL PROBLEM: ICD-10-CM

## 2025-08-25 PROCEDURE — 1101F PT FALLS ASSESS-DOCD LE1/YR: CPT | Mod: CPTII,HCNC,S$GLB, | Performed by: STUDENT IN AN ORGANIZED HEALTH CARE EDUCATION/TRAINING PROGRAM

## 2025-08-25 PROCEDURE — 99214 OFFICE O/P EST MOD 30 MIN: CPT | Mod: HCNC,S$GLB,, | Performed by: STUDENT IN AN ORGANIZED HEALTH CARE EDUCATION/TRAINING PROGRAM

## 2025-08-25 PROCEDURE — G2211 COMPLEX E/M VISIT ADD ON: HCPCS | Mod: HCNC,S$GLB,, | Performed by: STUDENT IN AN ORGANIZED HEALTH CARE EDUCATION/TRAINING PROGRAM

## 2025-08-25 PROCEDURE — 1125F AMNT PAIN NOTED PAIN PRSNT: CPT | Mod: CPTII,HCNC,S$GLB, | Performed by: STUDENT IN AN ORGANIZED HEALTH CARE EDUCATION/TRAINING PROGRAM

## 2025-08-25 PROCEDURE — 1157F ADVNC CARE PLAN IN RCRD: CPT | Mod: CPTII,HCNC,S$GLB, | Performed by: STUDENT IN AN ORGANIZED HEALTH CARE EDUCATION/TRAINING PROGRAM

## 2025-08-25 PROCEDURE — 3078F DIAST BP <80 MM HG: CPT | Mod: CPTII,HCNC,S$GLB, | Performed by: STUDENT IN AN ORGANIZED HEALTH CARE EDUCATION/TRAINING PROGRAM

## 2025-08-25 PROCEDURE — 3075F SYST BP GE 130 - 139MM HG: CPT | Mod: CPTII,HCNC,S$GLB, | Performed by: STUDENT IN AN ORGANIZED HEALTH CARE EDUCATION/TRAINING PROGRAM

## 2025-08-25 PROCEDURE — 96136 PSYCL/NRPSYC TST PHY/QHP 1ST: CPT | Mod: 59,HCNC,S$GLB, | Performed by: STUDENT IN AN ORGANIZED HEALTH CARE EDUCATION/TRAINING PROGRAM

## 2025-08-25 PROCEDURE — 3288F FALL RISK ASSESSMENT DOCD: CPT | Mod: CPTII,HCNC,S$GLB, | Performed by: STUDENT IN AN ORGANIZED HEALTH CARE EDUCATION/TRAINING PROGRAM

## 2025-08-25 PROCEDURE — 1159F MED LIST DOCD IN RCRD: CPT | Mod: CPTII,HCNC,S$GLB, | Performed by: STUDENT IN AN ORGANIZED HEALTH CARE EDUCATION/TRAINING PROGRAM

## 2025-08-25 PROCEDURE — 90833 PSYTX W PT W E/M 30 MIN: CPT | Mod: HCNC,S$GLB,, | Performed by: STUDENT IN AN ORGANIZED HEALTH CARE EDUCATION/TRAINING PROGRAM

## 2025-08-25 PROCEDURE — 1160F RVW MEDS BY RX/DR IN RCRD: CPT | Mod: CPTII,HCNC,S$GLB, | Performed by: STUDENT IN AN ORGANIZED HEALTH CARE EDUCATION/TRAINING PROGRAM

## 2025-08-25 PROCEDURE — 99999 PR PBB SHADOW E&M-EST. PATIENT-LVL IV: CPT | Mod: PBBFAC,HCNC,, | Performed by: STUDENT IN AN ORGANIZED HEALTH CARE EDUCATION/TRAINING PROGRAM

## 2025-08-25 RX ORDER — DULOXETIN HYDROCHLORIDE 20 MG/1
60 CAPSULE, DELAYED RELEASE ORAL DAILY
Qty: 90 CAPSULE | Refills: 3 | Status: SHIPPED | OUTPATIENT
Start: 2025-08-25

## 2025-09-05 ENCOUNTER — TELEPHONE (OUTPATIENT)
Dept: CARDIOLOGY | Facility: CLINIC | Age: 80
End: 2025-09-05
Payer: MEDICARE